# Patient Record
Sex: FEMALE | Race: WHITE | NOT HISPANIC OR LATINO | Employment: FULL TIME | URBAN - METROPOLITAN AREA
[De-identification: names, ages, dates, MRNs, and addresses within clinical notes are randomized per-mention and may not be internally consistent; named-entity substitution may affect disease eponyms.]

---

## 2017-01-16 ENCOUNTER — TRANSCRIBE ORDERS (OUTPATIENT)
Dept: ADMINISTRATIVE | Facility: HOSPITAL | Age: 47
End: 2017-01-16

## 2017-01-16 DIAGNOSIS — Z12.31 VISIT FOR SCREENING MAMMOGRAM: Primary | ICD-10-CM

## 2017-01-17 ENCOUNTER — HOSPITAL ENCOUNTER (OUTPATIENT)
Dept: MAMMOGRAPHY | Facility: CLINIC | Age: 47
Discharge: HOME | End: 2017-01-17
Payer: COMMERCIAL

## 2017-01-17 ENCOUNTER — HOSPITAL ENCOUNTER (OUTPATIENT)
Dept: ULTRASOUND IMAGING | Facility: CLINIC | Age: 47
Discharge: HOME | End: 2017-01-17
Payer: COMMERCIAL

## 2017-01-17 ENCOUNTER — GENERIC CONVERSION - ENCOUNTER (OUTPATIENT)
Dept: OTHER | Facility: OTHER | Age: 47
End: 2017-01-17

## 2017-01-17 DIAGNOSIS — Z12.31 ENCOUNTER FOR SCREENING MAMMOGRAM FOR MALIGNANT NEOPLASM OF BREAST: ICD-10-CM

## 2017-01-17 DIAGNOSIS — R92.8 OTHER (ABNORMAL) FINDINGS ON RADIOLOGICAL EXAMINATION OF BREAST: ICD-10-CM

## 2017-01-17 DIAGNOSIS — R92.8 OTHER ABNORMAL AND INCONCLUSIVE FINDINGS ON DIAGNOSTIC IMAGING OF BREAST: ICD-10-CM

## 2017-01-17 PROCEDURE — G0204 DX MAMMO INCL CAD BI: HCPCS

## 2017-01-17 PROCEDURE — 76642 ULTRASOUND BREAST LIMITED: CPT

## 2017-02-07 ENCOUNTER — ALLSCRIPTS OFFICE VISIT (OUTPATIENT)
Dept: OTHER | Facility: OTHER | Age: 47
End: 2017-02-07

## 2017-07-10 DIAGNOSIS — R92.8 OTHER ABNORMAL AND INCONCLUSIVE FINDINGS ON DIAGNOSTIC IMAGING OF BREAST: ICD-10-CM

## 2017-08-11 ENCOUNTER — TRANSCRIBE ORDERS (OUTPATIENT)
Dept: LAB | Facility: CLINIC | Age: 47
End: 2017-08-11

## 2017-08-11 ENCOUNTER — LAB CONVERSION - ENCOUNTER (OUTPATIENT)
Dept: OTHER | Facility: OTHER | Age: 47
End: 2017-08-11

## 2017-08-11 ENCOUNTER — APPOINTMENT (OUTPATIENT)
Dept: LAB | Facility: CLINIC | Age: 47
End: 2017-08-11
Payer: COMMERCIAL

## 2017-08-11 DIAGNOSIS — F41.1 GENERALIZED ANXIETY DISORDER: ICD-10-CM

## 2017-08-11 DIAGNOSIS — Z13.1 ENCOUNTER FOR SCREENING FOR DIABETES MELLITUS: ICD-10-CM

## 2017-08-11 DIAGNOSIS — Z13.220 ENCOUNTER FOR SCREENING FOR LIPOID DISORDERS: ICD-10-CM

## 2017-08-11 DIAGNOSIS — F32.9 MAJOR DEPRESSIVE DISORDER, SINGLE EPISODE: ICD-10-CM

## 2017-08-11 LAB
ALBUMIN SERPL BCP-MCNC: 3.6 G/DL (ref 3.5–5)
ALP SERPL-CCNC: 69 U/L (ref 46–116)
ALT SERPL W P-5'-P-CCNC: 22 U/L (ref 12–78)
ANION GAP SERPL CALCULATED.3IONS-SCNC: 9 MMOL/L (ref 4–13)
AST SERPL W P-5'-P-CCNC: 19 U/L (ref 5–45)
BACTERIA UR QL AUTO: ABNORMAL /HPF
BASOPHILS # BLD AUTO: 0.02 THOUSANDS/ΜL (ref 0–0.1)
BASOPHILS NFR BLD AUTO: 0 % (ref 0–1)
BILIRUB SERPL-MCNC: 0.6 MG/DL (ref 0.2–1)
BILIRUB UR QL STRIP: NEGATIVE
BUN SERPL-MCNC: 15 MG/DL (ref 5–25)
CALCIUM SERPL-MCNC: 8.8 MG/DL (ref 8.3–10.1)
CHLORIDE SERPL-SCNC: 103 MMOL/L (ref 100–108)
CHOLEST SERPL-MCNC: 134 MG/DL (ref 50–200)
CLARITY UR: CLEAR
CO2 SERPL-SCNC: 28 MMOL/L (ref 21–32)
COLOR UR: YELLOW
CREAT SERPL-MCNC: 0.81 MG/DL (ref 0.6–1.3)
EOSINOPHIL # BLD AUTO: 0.3 THOUSAND/ΜL (ref 0–0.61)
EOSINOPHIL NFR BLD AUTO: 4 % (ref 0–6)
ERYTHROCYTE [DISTWIDTH] IN BLOOD BY AUTOMATED COUNT: 13.8 % (ref 11.6–15.1)
EST. AVERAGE GLUCOSE BLD GHB EST-MCNC: 105 MG/DL
GFR SERPL CREATININE-BSD FRML MDRD: 87 ML/MIN/1.73SQ M
GLUCOSE P FAST SERPL-MCNC: 90 MG/DL (ref 65–99)
GLUCOSE UR STRIP-MCNC: NEGATIVE MG/DL
HBA1C MFR BLD: 5.3 % (ref 4.2–6.3)
HCT VFR BLD AUTO: 34.4 % (ref 34.8–46.1)
HDLC SERPL-MCNC: 55 MG/DL (ref 40–60)
HGB BLD-MCNC: 11.4 G/DL (ref 11.5–15.4)
HGB UR QL STRIP.AUTO: ABNORMAL
KETONES UR STRIP-MCNC: NEGATIVE MG/DL
LDLC SERPL CALC-MCNC: 60 MG/DL (ref 0–100)
LEUKOCYTE ESTERASE UR QL STRIP: ABNORMAL
LYMPHOCYTES # BLD AUTO: 2.63 THOUSANDS/ΜL (ref 0.6–4.47)
LYMPHOCYTES NFR BLD AUTO: 38 % (ref 14–44)
MCH RBC QN AUTO: 29.8 PG (ref 26.8–34.3)
MCHC RBC AUTO-ENTMCNC: 33.1 G/DL (ref 31.4–37.4)
MCV RBC AUTO: 90 FL (ref 82–98)
MONOCYTES # BLD AUTO: 0.52 THOUSAND/ΜL (ref 0.17–1.22)
MONOCYTES NFR BLD AUTO: 8 % (ref 4–12)
NEUTROPHILS # BLD AUTO: 3.46 THOUSANDS/ΜL (ref 1.85–7.62)
NEUTS SEG NFR BLD AUTO: 50 % (ref 43–75)
NITRITE UR QL STRIP: NEGATIVE
NON-SQ EPI CELLS URNS QL MICRO: ABNORMAL /HPF
PH UR STRIP.AUTO: 5.5 [PH] (ref 4.5–8)
PLATELET # BLD AUTO: 260 THOUSANDS/UL (ref 149–390)
PMV BLD AUTO: 9.5 FL (ref 8.9–12.7)
POTASSIUM SERPL-SCNC: 3.8 MMOL/L (ref 3.5–5.3)
PROT SERPL-MCNC: 7.8 G/DL (ref 6.4–8.2)
PROT UR STRIP-MCNC: NEGATIVE MG/DL
RBC # BLD AUTO: 3.83 MILLION/UL (ref 3.81–5.12)
RBC #/AREA URNS AUTO: ABNORMAL /HPF
SODIUM SERPL-SCNC: 140 MMOL/L (ref 136–145)
SP GR UR STRIP.AUTO: 1.02 (ref 1–1.03)
TRIGL SERPL-MCNC: 94 MG/DL
TSH SERPL DL<=0.05 MIU/L-ACNC: 2.96 UIU/ML (ref 0.36–3.74)
UROBILINOGEN UR QL STRIP.AUTO: 0.2 E.U./DL
WBC # BLD AUTO: 6.93 THOUSAND/UL (ref 4.31–10.16)
WBC #/AREA URNS AUTO: ABNORMAL /HPF

## 2017-08-11 PROCEDURE — 83036 HEMOGLOBIN GLYCOSYLATED A1C: CPT

## 2017-08-11 PROCEDURE — 80061 LIPID PANEL: CPT

## 2017-08-11 PROCEDURE — 81001 URINALYSIS AUTO W/SCOPE: CPT

## 2017-08-11 PROCEDURE — 80053 COMPREHEN METABOLIC PANEL: CPT

## 2017-08-11 PROCEDURE — 36415 COLL VENOUS BLD VENIPUNCTURE: CPT

## 2017-08-11 PROCEDURE — 84443 ASSAY THYROID STIM HORMONE: CPT

## 2017-08-11 PROCEDURE — 85025 COMPLETE CBC W/AUTO DIFF WBC: CPT

## 2017-08-14 ENCOUNTER — ALLSCRIPTS OFFICE VISIT (OUTPATIENT)
Dept: OTHER | Facility: OTHER | Age: 47
End: 2017-08-14

## 2017-08-28 ENCOUNTER — TRANSCRIBE ORDERS (OUTPATIENT)
Dept: ADMINISTRATIVE | Facility: HOSPITAL | Age: 47
End: 2017-08-28

## 2017-08-28 DIAGNOSIS — R92.8 ABNORMAL MAMMOGRAM: Primary | ICD-10-CM

## 2017-08-31 ENCOUNTER — ALLSCRIPTS OFFICE VISIT (OUTPATIENT)
Dept: OTHER | Facility: OTHER | Age: 47
End: 2017-08-31

## 2017-09-08 ENCOUNTER — HOSPITAL ENCOUNTER (OUTPATIENT)
Dept: MAMMOGRAPHY | Facility: CLINIC | Age: 47
Discharge: HOME | End: 2017-09-08
Payer: COMMERCIAL

## 2017-09-08 ENCOUNTER — HOSPITAL ENCOUNTER (OUTPATIENT)
Dept: ULTRASOUND IMAGING | Facility: CLINIC | Age: 47
Discharge: HOME | End: 2017-09-08
Payer: COMMERCIAL

## 2017-09-08 ENCOUNTER — GENERIC CONVERSION - ENCOUNTER (OUTPATIENT)
Dept: OTHER | Facility: OTHER | Age: 47
End: 2017-09-08

## 2017-09-08 DIAGNOSIS — R92.8 ABNORMAL MAMMOGRAM: ICD-10-CM

## 2017-09-08 PROCEDURE — G0279 TOMOSYNTHESIS, MAMMO: HCPCS

## 2017-09-08 PROCEDURE — G0206 DX MAMMO INCL CAD UNI: HCPCS

## 2017-09-08 PROCEDURE — 76642 ULTRASOUND BREAST LIMITED: CPT

## 2017-11-06 DIAGNOSIS — D64.9 ANEMIA: ICD-10-CM

## 2017-11-06 DIAGNOSIS — R31.29 OTHER MICROSCOPIC HEMATURIA: ICD-10-CM

## 2017-11-29 ENCOUNTER — APPOINTMENT (OUTPATIENT)
Dept: LAB | Facility: CLINIC | Age: 47
End: 2017-11-29
Payer: COMMERCIAL

## 2017-11-29 ENCOUNTER — TRANSCRIBE ORDERS (OUTPATIENT)
Dept: LAB | Facility: CLINIC | Age: 47
End: 2017-11-29

## 2017-11-29 DIAGNOSIS — D64.9 ANEMIA: ICD-10-CM

## 2017-11-29 DIAGNOSIS — R31.29 OTHER MICROSCOPIC HEMATURIA: ICD-10-CM

## 2017-11-29 LAB
BACTERIA UR QL AUTO: ABNORMAL /HPF
BASOPHILS # BLD AUTO: 0.02 THOUSANDS/ΜL (ref 0–0.1)
BASOPHILS NFR BLD AUTO: 0 % (ref 0–1)
BILIRUB UR QL STRIP: ABNORMAL
CLARITY UR: CLEAR
COLOR UR: YELLOW
EOSINOPHIL # BLD AUTO: 0.2 THOUSAND/ΜL (ref 0–0.61)
EOSINOPHIL NFR BLD AUTO: 4 % (ref 0–6)
ERYTHROCYTE [DISTWIDTH] IN BLOOD BY AUTOMATED COUNT: 14.9 % (ref 11.6–15.1)
FERRITIN SERPL-MCNC: 16 NG/ML (ref 8–388)
GLUCOSE UR STRIP-MCNC: NEGATIVE MG/DL
HCT VFR BLD AUTO: 33.7 % (ref 34.8–46.1)
HGB BLD-MCNC: 11.2 G/DL (ref 11.5–15.4)
HGB UR QL STRIP.AUTO: NEGATIVE
KETONES UR STRIP-MCNC: ABNORMAL MG/DL
LEUKOCYTE ESTERASE UR QL STRIP: ABNORMAL
LYMPHOCYTES # BLD AUTO: 1.7 THOUSANDS/ΜL (ref 0.6–4.47)
LYMPHOCYTES NFR BLD AUTO: 33 % (ref 14–44)
MCH RBC QN AUTO: 30 PG (ref 26.8–34.3)
MCHC RBC AUTO-ENTMCNC: 33.2 G/DL (ref 31.4–37.4)
MCV RBC AUTO: 90 FL (ref 82–98)
MONOCYTES # BLD AUTO: 0.34 THOUSAND/ΜL (ref 0.17–1.22)
MONOCYTES NFR BLD AUTO: 7 % (ref 4–12)
NEUTROPHILS # BLD AUTO: 2.87 THOUSANDS/ΜL (ref 1.85–7.62)
NEUTS SEG NFR BLD AUTO: 56 % (ref 43–75)
NITRITE UR QL STRIP: NEGATIVE
NON-SQ EPI CELLS URNS QL MICRO: ABNORMAL /HPF
PH UR STRIP.AUTO: 5.5 [PH] (ref 4.5–8)
PLATELET # BLD AUTO: 231 THOUSANDS/UL (ref 149–390)
PMV BLD AUTO: 10.4 FL (ref 8.9–12.7)
PROT UR STRIP-MCNC: NEGATIVE MG/DL
RBC # BLD AUTO: 3.73 MILLION/UL (ref 3.81–5.12)
RBC #/AREA URNS AUTO: ABNORMAL /HPF
SP GR UR STRIP.AUTO: >=1.03 (ref 1–1.03)
UROBILINOGEN UR QL STRIP.AUTO: 1 E.U./DL
WBC # BLD AUTO: 5.13 THOUSAND/UL (ref 4.31–10.16)
WBC #/AREA URNS AUTO: ABNORMAL /HPF

## 2017-11-29 PROCEDURE — 81001 URINALYSIS AUTO W/SCOPE: CPT

## 2017-11-29 PROCEDURE — 36415 COLL VENOUS BLD VENIPUNCTURE: CPT

## 2017-11-29 PROCEDURE — 85025 COMPLETE CBC W/AUTO DIFF WBC: CPT

## 2017-11-29 PROCEDURE — 82728 ASSAY OF FERRITIN: CPT

## 2017-11-30 ENCOUNTER — ALLSCRIPTS OFFICE VISIT (OUTPATIENT)
Dept: OTHER | Facility: OTHER | Age: 47
End: 2017-11-30

## 2017-11-30 ENCOUNTER — GENERIC CONVERSION - ENCOUNTER (OUTPATIENT)
Dept: OTHER | Facility: OTHER | Age: 47
End: 2017-11-30

## 2017-12-05 NOTE — PROGRESS NOTES
Assessment    1  Weight loss (783 21) (R63 4)   2  Attention deficit disorder (ADD) (314 00) (F98 8)   3  Depression (311) (F32 9)   4  Borderline anemia (285 9) (D64 9)   5  Generalized anxiety disorder (300 02) (F41 1)   6  Insomnia (780 52) (G47 00)    Plan  Attention deficit disorder (ADD)    · Amphetamine-Dextroamphet ER 30 MG Oral Capsule Extended Release 24  Hour (Adderall XR); TAKE 1 CAPSULE Daily  Borderline anemia    · (1) CBC/PLT/DIFF; Status:Active; Requested for:21May2018;    · (1) FERRITIN; Status:Active; Requested for:21May2018;    · (1) IRON PANEL; Status:Active; Requested for:21May2018; Discussion/Summary      Weight loss, intentional: Applauded for her recent efforts to eat healthier and lose weight  Encouraged to continue with this, including regular walking program  Previous favorable lipids, normal A1C  Depression/anxiety + ADHD + insomnia: Improved  Wishes to keep same regimen for now  No AE's  Anemia (mild, iron deficiency): Daily iron supplement advised  Handout with foods high in iron  Recheck prior to next visit  Preventative: Declines flu shot  RTO 6 months  Chief Complaint  3 month f/u medication      History of Present Illness    Here for routine follow-up  Has been working hard on eating healthier + losing weight  Has cut out unhealthy carbs and most sugars  Eating whole grain waffles, Greek yogurt, nuts for breakfast  Lean meats, spinach, etc  Drinking plenty of water   has made the same changes and lost over 20 pounds  Walking regularly  Got second dog--mixed breed, puppy  Feels great as a result of her new eating habits  Increased energy, focus/concentration, decreased anxiety  Hasn't had to take Ativan for a couple months  Takes Ambien on occasion for sleep  Lexapro remains helpful  Denies feeling down/depressed lately  Handling her work responsibilities better  Recent (11/29) lab results reviewed with patient   Continued mild anemia (Hgb 11 2; previous 11 4)  Low normal ferritin (16)    Still gets her period every month  No flu shot  Review of Systems    Constitutional: recent 30 lb weight loss, but no fever and not feeling tired  ENT: no sore throat  Cardiovascular: no chest pain  Respiratory: no shortness of breath  Gastrointestinal: no abdominal pain, no constipation and no diarrhea  Neurological: no headache and no dizziness  Psychiatric: as noted in HPI  Over the past 2 weeks, how often have you been bothered by the following problems? 1 ) Little interest or pleasure in doing things? Not at all    2 ) Feeling down, depressed or hopeless? Not at all  Active Problems    1  Abnormal mammogram (793 80) (R92 8)   2  Attention deficit disorder (ADD) (314 00) (F98 8)   3  History of Benign essential hypertension (401 1) (I10)   4  History of Bereavement (V62 82) (Z63 4)   5  Borderline anemia (285 9) (D64 9)   6  Depression (311) (F32 9)   7  Depression screening (V79 0) (Z13 89)   8  Diabetes mellitus screening (V77 1) (Z13 1)   9  Generalized anxiety disorder (300 02) (F41 1)   10  History of High serum renin (790 99) (R79 89)   11  History of hyperaldosteronism (V12 29) (Z86 39)   12  Insomnia (780 52) (G47 00)   13  History of Left knee pain (719 46) (M25 562)   14  Lipid screening (V77 91) (Z13 220)   15  History of Localized rash (782 1) (R21)   16  History of Microscopic hematuria (599 72) (R31 29)   17  Need for prophylactic vaccination and inoculation against influenza (V04 81) (Z23)   18  Need for Tdap vaccination (V06 1) (Z23)   19  Post traumatic stress disorder (309 81) (F43 10)   20  History of Viral upper respiratory infection (465 9) (J06 9,B97 89)   21  Visit for screening mammogram (V76 12) (Z12 31)   22  History of Weight gain (783 1) (R63 5)    Past Medical History    1  Abnormal mammogram (793 80) (R92 8)   2  Attention deficit disorder (ADD) (314 00) (F98 8)   3   History of Benign essential hypertension (401 1) (I10)   4  Borderline anemia (285 9) (D64 9)   5  Depression (311) (F32 9)   6  Diabetes mellitus screening (V77 1) (Z13 1)   7  History of High serum renin (790 99) (R79 89)   8  History of hyperaldosteronism (V12 29) (Z86 39)   9  History of hypokalemia (V12 29) (Z86 39)   10  History of Left knee pain (719 46) (M25 562)   11  Lipid screening (V77 91) (Z13 220)   12  History of Localized rash (782 1) (R21)   13  History of Microscopic hematuria (599 72) (R31 29)   14  Need for Tdap vaccination (V06 1) (Z23)   15  History of Patellofemoral arthritis, left   16  History of Pes anserine bursitis (726 61) (M70 50)   17  History of Pregnancy-induced hypertension (642 30) (O13 9)   18  History of Viral upper respiratory infection (465 9) (J06 9,B97 89)   19  Visit for screening mammogram (V76 12) (Z12 31)   20  History of Weight gain (783 1) (R63 5)   21  Weight loss (783 21) (R63 4)    The active problems and past medical history were reviewed and updated today  Surgical History    1  History of Dental Surgery   2  History of Hernia Repair    The surgical history was reviewed and updated today  Family History  Father    1  Family history of Diabetes Mellitus (V18 0)    The family history was reviewed and updated today  Social History    · Being A Social Drinker   · History of Bereavement (V62 82) (Z63 4)   · Educational Level - Completed Bachelors Degree   · Marital History - Currently    · Never A Smoker   · Occupation:  The social history was reviewed and updated today  The social history was reviewed and is unchanged  Current Meds   1  Amphetamine-Dextroamphet ER 30 MG Oral Capsule Extended Release 24 Hour; TAKE   1 CAPSULE Daily; Therapy: 73Bbm8378 to (Evaluate:22Nov2017); Last Rx:23Oct2017 Ordered   2  Escitalopram Oxalate 20 MG Oral Tablet; take one tablet by mouth every day;    Therapy: 94QKC1283 to (Evaluate:21Apr2018)  Requested for: 60KWS4760; Last Rx: 85Msb4974 Ordered   3  LORazepam 0 5 MG Oral Tablet; TAKE 1-2 TABS TWICE A DAY AS NEEDED FOR   ANXIETY; Therapy: 15UCZ5712 to (Last Rx:43Uyx7495) Ordered   4  Zolpidem Tartrate 5 MG Oral Tablet; TAKE 1 TABLET AT BEDTIME AS NEEDED; Therapy: 72Qxm8176 to (Evaluate:51Tyw5367); Last GL:82DZP5161 Ordered    The medication list was reviewed and updated today  Allergies    1  No Known Drug Allergies    Vitals  Vital Signs    Recorded: 68PCR1471 09:05AM   Temperature 98 1 F   Heart Rate 80   Systolic 724   Diastolic 80   Height 5 ft 1 5 in   Weight 123 lb    BMI Calculated 22 86   BSA Calculated 1 55   O2 Saturation 98     Physical Exam    Constitutional   General appearance: No acute distress, well appearing and well nourished  Ears, Nose, Mouth, and Throat   Oropharynx: Normal with no erythema, edema, exudate or lesions  Pulmonary   Respiratory effort: No increased work of breathing or signs of respiratory distress  Auscultation of lungs: Clear to auscultation  Cardiovascular   Auscultation of heart: Normal rate and rhythm, normal S1 and S2, no murmurs  Musculoskeletal   Gait and station: Normal     Psychiatric   Judgment and insight: Normal     Orientation to person, place, and time: Normal     Recent and remote memory: Intact      Mood and affect: Normal        Signatures   Electronically signed by : Ijeoma Kahn; Nov 30 2017  9:50AM EST                       (Author)    Electronically signed by : Alfonso Littlejohn DO; Nov 30 2017  9:51AM EST                       (Author)

## 2018-01-13 VITALS
HEIGHT: 62 IN | TEMPERATURE: 98.1 F | OXYGEN SATURATION: 98 % | WEIGHT: 123 LBS | BODY MASS INDEX: 22.63 KG/M2 | HEART RATE: 80 BPM | DIASTOLIC BLOOD PRESSURE: 80 MMHG | SYSTOLIC BLOOD PRESSURE: 138 MMHG
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
## 2018-01-13 NOTE — RESULT NOTES
Discussion/Summary      Blood work results notable for borderline normocytic anemia as well as small amount of blood in urine with 0-1 RBC's pmf  Will discuss at upcoming follow-up visit  Verified Results  (1) CBC/PLT/DIFF 11Aug2017 06:24AM Gloria Molina    Order Number: [id number]     Test Name Result Flag Reference   WBC COUNT 6 93 Thousand/uL  4 31-10 16   RBC COUNT 3 83 Million/uL  3 81-5 12   HEMOGLOBIN 11 4 g/dL L 11 5-15 4   HEMATOCRIT 34 4 % L 34 8-46  1   MCV 90 fL  82-98   MCH 29 8 pg  26 8-34 3   MCHC 33 1 g/dL  31 4-37 4   RDW 13 8 %  11 6-15 1   MPV 9 5 fL  8 9-12 7   PLATELET COUNT 902 Thousands/uL  149-390   NEUTROPHILS RELATIVE PERCENT 50 %  43-75   LYMPHOCYTES RELATIVE PERCENT 38 %  14-44   MONOCYTES RELATIVE PERCENT 8 %  4-12   EOSINOPHILS RELATIVE PERCENT 4 %  0-6   BASOPHILS RELATIVE PERCENT 0 %  0-1   NEUTROPHILS ABSOLUTE COUNT 3 46 Thousands/? ??L  1 85-7 62   LYMPHOCYTES ABSOLUTE COUNT 2 63 Thousands/? ??L  0 60-4 47   MONOCYTES ABSOLUTE COUNT 0 52 Thousand/? ??L  0 17-1 22   EOSINOPHILS ABSOLUTE COUNT 0 30 Thousand/? ??L  0 00-0 61   BASOPHILS ABSOLUTE COUNT 0 02 Thousands/? ??L  0 00-0 10   - Patient Instructions: This bloodwork is non-fasting  Please drink two glasses of water morning of bloodwork  (1) COMPREHENSIVE METABOLIC PANEL 14DSD3013 32:47ZW Gloria Molina    Order Number: [id number]     Test Name Result Flag Reference   SODIUM 140 mmol/L  136-145   POTASSIUM 3 8 mmol/L  3 5-5 3   CHLORIDE 103 mmol/L  100-108   CARBON DIOXIDE 28 mmol/L  21-32   ANION GAP (CALC) 9 mmol/L  4-13   BLOOD UREA NITROGEN 15 mg/dL  5-25   CREATININE 0 81 mg/dL  0 60-1 30   Standardized to IDMS reference method   CALCIUM 8 8 mg/dL  8 3-10 1   BILI, TOTAL 0 60 mg/dL  0 20-1 00   ALK PHOSPHATAS 69 U/L     ALT (SGPT) 22 U/L  12-78   Specimen collection should occur prior to Sulfasalazine administration due to the potential for falsely depressed results     AST(SGOT) 19 U/L 5-45   Specimen collection should occur prior to Sulfasalazine administration due to the potential for falsely depressed results  ALBUMIN 3 6 g/dL  3 5-5 0   TOTAL PROTEIN 7 8 g/dL  6 4-8 2   eGFR 87 ml/min/1 73sq m     National Kidney Disease Education Program recommendations are as follows:  GFR calculation is accurate only with a steady state creatinine  Chronic Kidney disease less than 60 ml/min/1 73 sq  meters  Kidney failure less than 15 ml/min/1 73 sq  meters  GLUCOSE FASTING 90 mg/dL  65-99   Specimen collection should occur prior to Sulfasalazine administration due to the potential for falsely depressed results  Specimen collection should occur prior to Sulfapyridine administration due to the potential for falsely elevated results  (1) HEMOGLOBIN A1C 11Aug2017 06:24AM Jayy Vincent    Order Number: [id number]     Test Name Result Flag Reference   HEMOGLOBIN A1C 5 3 %  4 2-6 3   EST  AVG  GLUCOSE 105 mg/dl       (1) LIPID PANEL FASTING W DIRECT LDL REFLEX 11Aug2017 06:24AM Jayy Vincent    Order Number: [id number]     Test Name Result Flag Reference   CHOLESTEROL 134 mg/dL     LDL CHOLESTEROL CALCULATED 60 mg/dL  0-100   - Patient Instructions: This is a fasting blood test  Water, black tea or black coffee only after 9:00pm the night before test   Drink 2 glasses of water the morning of test       Triglyceride:        Normal ??? ??? ??? ??? ??? ??? ??? <150 mg/dl   ??? ??? ???Borderline High ??? ??? 150-199 mg/dl   ??? ??? ? ?? High ??? ??? ??? ??? ??? ??? ??? 200-499 mg/dl   ??? ??? ? ??Very High ??? ??? ??? ??? ??? >499 mg/dl      Cholesterol:       Desirable ??? ??? ??? ??? <200 mg/dl   ??? ??? Borderline High ??? 200-239 mg/dl   ??? ???  High ??? ??? ??? ??? ??? ??? >239 mg/dl      HDL Cholesterol:       High ??? ???>59 mg/dL   ??? ??? Low ??? ??? <41 mg/dL      HDL Cholesterol:       High ??? ???>59 mg/dL   ??? ??? Low ??? ??? <41 mg/dL      This screening LDL is a calculated result  It does not have the accuracy of the Direct Measured LDL in the monitoring of patients with hyperlipidemia and/or statin therapy  Direct Measure LDL (GJQ753) must be ordered separately in these patients  TRIGLYCERIDES 94 mg/dL  <=150   Specimen collection should occur prior to N-Acetylcysteine or Metamizole administration due to the potential for falsely depressed results  HDL,DIRECT 55 mg/dL  40-60   Specimen collection should occur prior to Metamizole administration due to the potential for falsley depressed results  (1) URINALYSIS w URINE C/S REFLEX (will reflex a microscopy if leukocytes, occult blood, or nitrites are not within normal limits) 11Aug2017 06:24AM Anna Mccartneymaría elena    Order Number: [id number]     Test Name Result Flag Reference   COLOR Yellow     CLARITY Clear     PH UA 5 5  4 5-8 0   LEUKOCYTE ESTERASE UA Trace A Negative   NITRITE UA Negative  Negative   PROTEIN UA Negative mg/dl  Negative   GLUCOSE UA Negative mg/dl  Negative   KETONES UA Negative mg/dl  Negative   UROBILINOGEN UA 0 2 E U /dl  0 2, 1 0 E U /dl   BILIRUBIN UA Negative  Negative   BLOOD UA Small A Negative   SPECIFIC GRAVITY UA 1 025  1 003-1 030   BACTERIA Occasional /hpf  None Seen, Occasional   EPITHELIAL CELLS Occasional /hpf  None Seen, Occasional   RBC UA 0-1 /hpf A None Seen   WBC UA 4-10 /hpf A None Seen     (1) TSH WITH FT4 REFLEX 11Aug2017 06:24AM Anna Mccartneymaría elena    Order Number: [id number]     Test Name Result Flag Reference   TSH 2 965 uIU/mL  0 358-3 740   Patients undergoing fluorescein dye angiography may retain small amounts of fluorescein in the body for 48-72 hours post procedure  Samples containing fluorescein can produce falsely depressed TSH values  If the patient had this procedure,a specimen should be resubmitted post fluorescein clearance            The recommended reference ranges for TSH during pregnancy are as follows:  First trimester 0 1 to 2 5 uIU/mL  Second trimester  0 2 to 3 0 uIU/mL  Third trimester 0 3 to 3 0 uIU/m

## 2018-01-13 NOTE — MISCELLANEOUS
Message  Spoke to patient's  as patient is at work and notified him that her saline infusion test was -ve  Will discuss further during next visit        Signatures   Electronically signed by : CYNDI Wu ; Feb 10 2016  2:33PM EST                       (Author)

## 2018-01-13 NOTE — RESULT NOTES
Verified Results  Renetta Maxim W/CAD 29NKP0482 06:54AM Rodrigo Ear     Test Name Result Flag Reference   DIGTL SCREEN MAMMO W/CAD (Report)     [facility];[address];;[place];[address]   01/08/2016 0700 01/08/2016 6952   N/A     Patient History-   Patient had first child at age 35  No known family history of cancer  Patient has never smoked  Patient's BMI is 22 9  Reason for exam- screening (asymptomatic)  Digital Bilateral Screening Mammogram   Bilateral MLO, CC, and XCCL view(s) were taken  Technologist- Alix Tello, RT(R)(M)   The breast tissue is heterogeneously dense, potentially limiting    the sensitivity of mammography  Patient risk, included in this    report, assists in determining the appropriate screening regimen    (such as 3-D mammography or the inclusion of automated breast    ultrasound or MRI)  3-D mammography may also remain indicated as    screening  No dominant soft tissue mass, architectural distortion or    suspicious calcifications are noted in either breast  The skin    and nipple contours are within normal limits  Nodular density is   present in the posterior upper outer right breast     Calcifications are also present in the upper outer right breast     Spot magnification views recommended for this baseline    evaluation  ASSESSMENT- [facility]-0 - Incomplete- needs additional imaging    evaluation     A breast health care nurse from our facility will be contacting    the patient regarding the need for additional imaging  Recommendation-   Further imaging of the right breast    Analyzed by CAD   8-10% of cancers will be missed on mammography  Management of a    palpable abnormality must be based on clinical grounds  Patients   will be notified of their results via letter from our facility  Accredited by [employer] of Radiology and FDA       Transcription Location- [address]   [place]  Year- 2 360%, [place] Lifetime- 12 892%,    Myriad Table- 1 5%, SUMMER 5 Year- 1 1%, [facility] Lifetime- 13 0%     - Ford Lombard, RAD MD   [place] Radiologist- Ford Lombard, RAD MD   Electronically Signed- Ford Lombard, RAD MD   Released Date Time- 01/08/16 0807   ------------------------------------------------------------------------------   [id number]^DERIAN DILLARD   [id number]^DERIAN DILLARD       Plan  Abnormal mammogram    · MAMMO DIAGNOSTIC RIGHT W CAD; Status:Hold For - Scheduling; Requested  for:11Jan2016;     Discussion/Summary   Right breast with nodular densities + calcifications  Additional right sided mammogram recommended  Test ordered  Breast center should also be contacting her

## 2018-01-14 VITALS
WEIGHT: 151 LBS | RESPIRATION RATE: 17 BRPM | BODY MASS INDEX: 27.79 KG/M2 | TEMPERATURE: 97.6 F | DIASTOLIC BLOOD PRESSURE: 74 MMHG | HEIGHT: 62 IN | SYSTOLIC BLOOD PRESSURE: 130 MMHG | HEART RATE: 90 BPM

## 2018-01-14 VITALS
WEIGHT: 150.06 LBS | SYSTOLIC BLOOD PRESSURE: 138 MMHG | OXYGEN SATURATION: 99 % | BODY MASS INDEX: 27.62 KG/M2 | DIASTOLIC BLOOD PRESSURE: 88 MMHG | HEIGHT: 62 IN | HEART RATE: 86 BPM | TEMPERATURE: 97.8 F | RESPIRATION RATE: 17 BRPM

## 2018-01-14 VITALS
OXYGEN SATURATION: 97 % | SYSTOLIC BLOOD PRESSURE: 134 MMHG | HEIGHT: 62 IN | BODY MASS INDEX: 29.36 KG/M2 | TEMPERATURE: 98 F | HEART RATE: 94 BPM | DIASTOLIC BLOOD PRESSURE: 74 MMHG | RESPIRATION RATE: 18 BRPM | WEIGHT: 159.56 LBS
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
## 2018-01-16 NOTE — RESULT NOTES
Discussion/Summary   Results discussed with patient at office visit 11/30  See note for details  Verified Results  (1) CBC/PLT/DIFF [id number] 07:20AM Linh Yeh    Order Number: [id number]     Test Name Result Flag Reference   WBC COUNT 5 13 Thousand/uL  4 31-10 16   RBC COUNT 3 73 Million/uL L 3 81-5 12   HEMOGLOBIN 11 2 g/dL L 11 5-15 4   HEMATOCRIT 33 7 % L 34 8-46  1   MCV 90 fL  82-98   MCH 30 0 pg  26 8-34 3   MCHC 33 2 g/dL  31 4-37 4   RDW 14 9 %  11 6-15 1   MPV 10 4 fL  8 9-12 7   PLATELET COUNT 821 Thousands/uL  149-390   NEUTROPHILS RELATIVE PERCENT 56 %  43-75   LYMPHOCYTES RELATIVE PERCENT 33 %  14-44   MONOCYTES RELATIVE PERCENT 7 %  4-12   EOSINOPHILS RELATIVE PERCENT 4 %  0-6   BASOPHILS RELATIVE PERCENT 0 %  0-1   NEUTROPHILS ABSOLUTE COUNT 2 87 Thousands/? ??L  1 85-7 62   LYMPHOCYTES ABSOLUTE COUNT 1 70 Thousands/? ??L  0 60-4 47   MONOCYTES ABSOLUTE COUNT 0 34 Thousand/? ??L  0 17-1 22   EOSINOPHILS ABSOLUTE COUNT 0 20 Thousand/? ??L  0 00-0 61   BASOPHILS ABSOLUTE COUNT 0 02 Thousands/? ??L  0 00-0 10     (1) URINALYSIS w URINE C/S REFLEX (will reflex a microscopy if leukocytes, occult blood, or nitrites are not within normal limits) [id number] 07:20AM Linh Yeh    Order Number: [id number]     Test Name Result Flag Reference   COLOR Yellow     CLARITY Clear     PH UA 5 5  4 5-8 0   LEUKOCYTE ESTERASE UA Small A Negative   NITRITE UA Negative  Negative   PROTEIN UA Negative mg/dl  Negative   GLUCOSE UA Negative mg/dl  Negative   KETONES UA 40 (2+) mg/dl A Negative   UROBILINOGEN UA 1 0 E U /dl  0 2, 1 0 E U /dl   BILIRUBIN UA Small A Negative   BLOOD UA Negative  Negative   SPECIFIC GRAVITY UA >=1 030  1 003-1 030   BACTERIA Occasional /hpf  None Seen, Occasional   EPITHELIAL CELLS Occasional /hpf  None Seen, Occasional   RBC UA None Seen /hpf  None Seen, 0-5   WBC UA 4-10 /hpf A None Seen, 0-5, 5-55, 5-65     (1) FERRITIN [id number] 07:20AM Linh Yeh    Order Number: [id number]     Test Name Result Flag Reference   FERRITIN 16 ng/mL  2-110

## 2018-01-16 NOTE — RESULT NOTES
Message   Can we mail requisitions for repeat ultrasound + mammogram, thanks  Verified Results  MAMMO DIAGNOSTIC RIGHT W CAD [id number] 07:57AM Luis A Bonillalalinda     Test Name Result Flag Reference   MAMMO DIAGNOSTIC RIGHT W CAD (Report)     Patient History:   Patient had first child at age 35  No known family history of cancer  Patient has never smoked  Patient's BMI is 22 9  Reason for exam: additional evaluation requested from prior    study  Short interval follow-up of right breast microcalcifications and    right breast masses seen with ultrasound  Mammo Diagnostic Right W CAD: July 18, 2016 - Check In #: [de-identified]   CC, MLO, CC with magnification, ML with magnification, and XCCL    view(s) were taken of the right breast      Technologist: ROBBIN Mohr (R)(M)   Prior study comparison: January 13, 2016, mammo diagnostic right    W CAD performed at [address] [place] [address]  January 13, 2016, US breast right limited performed at [address] [place] [address]  January 8, 2016, digital bilateral    screening mammogram, performed at [address] [place] [address]  The breast tissue is extremely dense, potentially limiting the    sensitivity of mammography  Patient risk, included in this    report, assists in determining the appropriate screening regimen    (such as 3-D mammography or the inclusion of automated breast    ultrasound or MRI)  3-D mammography may also remain indicated as    screening  The microcalcifications in the outer right breast    appear stable from the previous exam, but require continued    six-month surveillance with magnification imaging  No new mass    lesions or additional calcifications are appreciated  Skin and    nipple profile are normal  No axillary adenopathy  Ultrasound of the right breast was performed and compared with    the prior study   At the 10:00 location 7 cm from nipple there is   11 x 3 x 10 mm benign-appearing circumscribed hypoechoic oval    wider than tall mass most consistent with fibroadenoma  Immediately adjacent to this is a similar-appearing structure    which is 7 x 3 x 8 mm  No suspicious interval change from prior    study  There is a cyst noted at the 9:00 location 6 cm from    nipple which is minimally complicated measuring 8 x 4 x 6 mm  Good posterior acoustic transmission noted  No internal flow    related to any of the findings  Again, continued six-month    ultrasound surveillance suggested  Stable exam     US Breast Right Limited: July 18, 2016 - Check In #: [de-identified]   Technologist: Alec Adams RDMS     ASSESSMENT: BiRad:3 - Probably Benign (Overall)     Recommendation:   Follow-up diagnostic mammogram of both breasts in 6 months  Ultrasound of the right breast in 6 months  Analyzed by CAD     Transcription Location: [facility]   Signing Station: [id number]     Risk Value(s):   Tyrer-Cuzick 10 Year: 2 360%, Tyrer-Cuzick Lifetime: 12 892%,    Myriad Table: 1 5%, SUMMER 5 Year: 1 1%, NCI Lifetime: 13 0%   Signed by:   Luster Collet, MD   7/18/16     *US BREAST RIGHT LIMITED (DIAGNOSTIC) [id number] 07:57AM Yue Counter     Test Name Result Flag Reference   US BREAST RIGHT LIMITED (Report)     Patient History:   Patient had first child at age 35  No known family history of cancer  Patient has never smoked  Patient's BMI is 22 9  Reason for exam: additional evaluation requested from prior    study  Short interval follow-up of right breast microcalcifications and    right breast masses seen with ultrasound  Mammo Diagnostic Right W CAD: July 18, 2016 - Check In #: [de-identified]   CC, MLO, CC with magnification, ML with magnification, and XCCL    view(s) were taken of the right breast      Technologist: ROBBIN Howe (R)(M)   Prior study comparison: January 13, 2016, mammo diagnostic right    W CAD performed at [address] [place] [address]   January 13, 2016, US breast right limited performed at [address] [place] [address]  January 8, 2016, digital bilateral    screening mammogram, performed at [facility]  The breast tissue is extremely dense, potentially limiting the    sensitivity of mammography  Patient risk, included in this    report, assists in determining the appropriate screening regimen    (such as 3-D mammography or the inclusion of automated breast    ultrasound or MRI)  3-D mammography may also remain indicated as    screening  The microcalcifications in the outer right breast    appear stable from the previous exam, but require continued    six-month surveillance with magnification imaging  No new mass    lesions or additional calcifications are appreciated  Skin and    nipple profile are normal  No axillary adenopathy  Ultrasound of the right breast was performed and compared with    the prior study  At the 10:00 location 7 cm from nipple there is   11 x 3 x 10 mm benign-appearing circumscribed hypoechoic oval    wider than tall mass most consistent with fibroadenoma  Immediately adjacent to this is a similar-appearing structure    which is 7 x 3 x 8 mm  No suspicious interval change from prior    study  There is a cyst noted at the 9:00 location 6 cm from    nipple which is minimally complicated measuring 8 x 4 x 6 mm  Good posterior acoustic transmission noted  No internal flow    related to any of the findings  Again, continued six-month    ultrasound surveillance suggested  Stable exam     US Breast Right Limited: July 18, 2016 - Check In #: [de-identified]   Technologist: Za Frankel RDMS     ASSESSMENT: BiRad:3 - Probably Benign (Overall)     Recommendation:   Follow-up diagnostic mammogram of both breasts in 6 months  Ultrasound of the right breast in 6 months     Analyzed by CAD     Transcription Location: [address]   Signing Station: [id number]     Risk Value(s):   Mark-Palma 10 Year: 2 360%, Tyrer-Cuelmer Lifetime: 12 892%,    Myriad Table: 1 5%, SUMMER 5 Year: 1 1%, NCI Lifetime: 13 0%   Signed by:   Monika Linda MD   7/18/16       Plan  Abnormal mammogram, Visit for screening mammogram    · * MAMMO SCREENING BILATERAL W CAD; Status:Hold For - Scheduling; Requested  for:16Jan2017;    · US BREAST RIGHT COMPLETE (ABUS); Status:Hold For - Scheduling; Requested  WCL:[id number];     Discussion/Summary   Right breast ultrasound/mammogram showing STABLE masses + calcifications + cysts  No suspicious lesions  Radiologist recommends repeat right breast ultrasound + regular screening mammogram in 6 months  Will mail slips for this

## 2018-01-17 NOTE — RESULT NOTES
Message   Can we mail slip for repeat mammogram?  Thanks     Verified Results  *US BREAST RIGHT LIMITED (DIAGNOSTIC) [id number] 09:32AM Zari Choe     Test Name Result Flag Reference   US BREAST RIGHT LIMITED (Report)     Patient History:   Patient had first child at age 35  No known family history of cancer  Patient has never smoked  Patient's BMI is 22 9  Reason for exam: follow-up at short interval from prior study  Six-month follow-up of calcifications in the right breast and    hypoechoic lesions on ultrasound in the right breast      Mammo Diagnostic Bilateral W CAD: January 17, 2017 - Check In #:    [de-identified]   Bilateral MLO, CC, and XCCL view(s) were taken  CC with    magnification and ML with magnification view(s) were taken of the   right breast      Technologist: RT Lisa(R)(M)   Prior study comparison: July 18, 2016, US breast right limited    performed at [address] [place] [address]  July 18, 2016,    mammo diagnostic right W CAD performed at [address] [place] [address]  January 13, 2016, US breast right limited    performed at [address] [place] [address]  January 13, 2016, mammo diagnostic right W CAD performed at [address] [place] [address]  January 8, 2016, digital bilateral    screening mammogram, performed at [address] [place] [address]  The breast tissue is heterogeneously dense, potentially limiting    the sensitivity of mammography  Patient risk, included in this    report, assists in determining the appropriate screening regimen    (such as 3-D mammography or the inclusion of automated breast    ultrasound or MRI)  3-D mammography may also remain indicated as    screening  These images were obtained using digital technique    and with the assistance of Computer Aided Detection  There are    no dominant masses, foci of architectural distortion to suggest    malignancy   The visualized skin appears normal  Previously    described calcifications are stable  Continued six-month    surveillance with magnification views recommended  Targeted ultrasound demonstrates no evidence of new hypoechoic    mass or architectural distortion to suggest malignancy  Previously described cysts of varying complexity are also stable  US Breast Right Limited: January 17, 2017 - Check In #: [de-identified]   Technologist: Calderon Matta RDMS     ASSESSMENT: BiRad:3 - Probably Benign (Overall)   Diag Mammogram: BiRad:3 - probably benign finding in the right    breast    Right breast Right Brst US: BiRad:3 - probably benign finding in    the right breast      Recommendation:   Follow-up diagnostic mammogram of the right breast in 6 months  Analyzed by CAD     Transcription Location: [address]   Signing Station: [id number]     Risk Value(s):   Tyrer-Cuzick 10 Year: 2 463%, Tyrer-Cuzick Lifetime: 12 737%,    Myriad Table: 1 5%, SUMMER 5 Year: 1 2%, NCI Lifetime: 12 8%   Signed by:   Shadia Guzmán MD   1/17/17     MAMMO DIAGNOSTIC BILATERAL W CAD [id number] 08:23AM Naty Vann    Order Number: [id number]    - Patient Instructions: To schedule this appointment, please contact Central Scheduling at [phone]  Do not wear any perfume, powder, lotion or deodorant on breast or underarm area  Please bring your doctors order, referral (if needed) and insurance information with you on the day of the test  Failure to bring this information may result in this test being rescheduled  Arrive 15 minutes prior to your appointment time to register  On the day of your test, please bring any prior mammogram or breast studies with you that were not performed at a [facility]  Failure to bring prior exams may result in your test needing to be rescheduled   Order Number: [id number]    - Patient Instructions: To schedule this appointment, please contact Central Scheduling at [phone]    Do not wear any perfume, powder, lotion or deodorant on breast or underarm area        Please bring your doctors order, referral (if needed) and insurance information with you on the day of the test  Failure to bring this information may result in this test being rescheduled  Arrive 15 minutes prior to your appointment time to register  On the day of your test, please bring any prior mammogram or breast studies with you that were not performed at a [facility]  Failure to bring prior exams may result in your test needing to be rescheduled  Test Name Result Flag Reference   MAMMO DIAGNOSTIC BILATERAL W CAD (Report)     Patient History:   Patient had first child at age 35  No known family history of cancer  Patient has never smoked  Patient's BMI is 22 9  Reason for exam: follow-up at short interval from prior study  Six-month follow-up of calcifications in the right breast and    hypoechoic lesions on ultrasound in the right breast      Mammo Diagnostic Bilateral W CAD: January 17, 2017 - Check In #:    [de-identified]   Bilateral MLO, CC, and XCCL view(s) were taken  CC with    magnification and ML with magnification view(s) were taken of the   right breast      Technologist: RT Sebastian(R)(M)   Prior study comparison: July 18, 2016, US breast right limited    performed at [address] [place] [address]  July 18, 2016,    mammo diagnostic right W CAD performed at [address] [place] [address]  January 13, 2016, US breast right limited    performed at [address] [place] [address]  January 13, 2016, mammo diagnostic right W CAD performed at [address] [place] [address]  January 8, 2016, digital bilateral    screening mammogram, performed at [address] [place] [address]  The breast tissue is heterogeneously dense, potentially limiting    the sensitivity of mammography   Patient risk, included in this    report, assists in determining the appropriate screening regimen    (such as 3-D mammography or the inclusion of automated breast ultrasound or MRI)  3-D mammography may also remain indicated as    screening  These images were obtained using digital technique    and with the assistance of Computer Aided Detection  There are    no dominant masses, foci of architectural distortion to suggest    malignancy  The visualized skin appears normal  Previously    described calcifications are stable  Continued six-month    surveillance with magnification views recommended  Targeted ultrasound demonstrates no evidence of new hypoechoic    mass or architectural distortion to suggest malignancy  Previously described cysts of varying complexity are also stable  US Breast Right Limited: January 17, 2017 - Check In #: [de-identified]   Technologist: Paola Delatorre RDMS     ASSESSMENT: BiRad:3 - Probably Benign (Overall)   Diag Mammogram: BiRad:3 - probably benign finding in the right    breast    Right breast Right Brst US: BiRad:3 - probably benign finding in    the right breast      Recommendation:   Follow-up diagnostic mammogram of the right breast in 6 months  Analyzed by CAD     Transcription Location: [address]   Signing Station: [id number]     Risk Value(s):   Tyrer-Cuzick 10 Year: 2 463%, Tyrer-Cuzick Lifetime: 12 737%,    Myriad Table: 1 5%, SUMMER 5 Year: 1 2%, NCI Lifetime: 12 8%   Signed by:   Genesis Monterroso MD   1/17/17       Plan  Abnormal mammogram    · MAMMO DIAGNOSTIC RIGHT W CAD; Status:Hold For - Scheduling; Requested  for:[id number];     Discussion/Summary      Mammogram and ultrasound showing stable right breast calcifications  Recommend repeat right breast mammogram in 6 months (will mail slip for this)

## 2018-01-17 NOTE — RESULT NOTES
Discussion/Summary   Patient notified of stable, likely benign, right breast findings  Repeat mammo + ultrasound ordered for 6 months and mailed to patient  Verified Results  MAMMO DIAGNOSTIC RIGHT W 3D & CAD [id number] 07:36AM Atiya Johnson     Test Name Result Flag Reference   [facility] DIAGNOSTIC RIGHT W 3D & CAD (Report)     Patient History:   Patient had first child at age 35  No known family history of cancer  Patient has never smoked  Patient's BMI is 22 9  Reason for exam: addl eval requested from prior study  54-year-old female with probably benign right breast    calcifications as well as probably benign right breast nodules    seen on ultrasound  The patient is here for six-month    reevaluation  Mammo Diagnostic Right W DBT and CAD: September 8, 2017 - Check    In #: [de-identified]   2D/3D Procedure   3D views: MLO view(s) were taken of the right breast    2D views: CC and XCCL view(s) were taken of the right breast        Technologist: ROBBIN Viramontes (ROBBIN)(M)   Prior study comparison: January 17, 2017, mammo diagnostic    bilateral W CAD performed at [facility]  January 17, 2017, US breast right limited performed at [address] [facility]  July 18, 2016, mammo diagnostic right W   CAD performed at [address] [facility]  July 18, 2016, US breast right limited performed at [address] [facility]  January 13, 2016, US breast right limited    performed at [address] [facility]  January 13, 2016, mammo diagnostic right W CAD performed at [address] [facility]  January 8, 2016, digital bilateral    screening mammogram, performed at [address] [place] [address]  The breast tissue is heterogeneously dense, potentially limiting    the sensitivity of mammography   Patient risk, included in this    report, assists in determining the appropriate screening regimen    (such as 3-D mammography or the inclusion of automated breast    ultrasound or MRI)  3-D mammography may also remain indicated as    screening  US Breast Right Limited: September 8, 2017 - Check In #: [de-identified]   Standard views  Technologist: Raheem Reyes RDMS   Heterogeneity can be either focal or diffuse  The breast    echotexture is characterized by multiple small areas of increased   and decreased echogenicity  Shadowing may occur at the    interfaces of the fat lobules and parenchyma  This pattern occurs   in younger breasts and those with heterogeneously dense    parenchyma depicted mammographically  Again identified is the    loose group of calcifications in the outer right breast, not    significantly changed from the 1st diagnostic examination    obtained January 13, 2016  No dominant masses are seen  There is a stable nodular asymmetry   in the outer posterior right breast  There is no architectural    distortion or skin thickening  The axillary regions are benign  Following diagnostic mammography, the patient was taken to the    ultrasound suite  RIGHT BREAST ULTRASOUND:     At the 10 o'clock position, 7 cm from the nipple, there is a    hypoechoic nodule which measures 1 x 0 4 x 0 8 cm  No internal    vascularity is identified  At the 10 o'clock position, 7 cm from the nipple, there is a    hypoechoic nodule measuring 0 5 x 0 3 x 0 8 cm  A 3rd nodule is seen at the 9 o'clock position, 6 cm from the    nipple, measuring 0 9 x 0 3 x 0 7 cm  The above-described nodules are stable when compared to the prior   studies  No new nodules are identified  1  Stable right diagnostic mammogram and right breast    ultrasound  2  Bilateral diagnostic mammography as well as repeat right    breast ultrasound in 6 months is recommended to document    stability of the above-described probably benign findings       ACR BI-RADSï¾® Assessments: BiRad:3 - Probably Benign (Overall)   Right breast Right Diag Mamm: BiRad:3 - probably benign finding    in the right breast    Right breast Right Brst US: BiRad:3 - probably benign finding in    the right breast      Recommendation:   Follow-up diagnostic mammogram of both breasts in 6 months  Ultrasound of the right breast in 6 months  The patient is scheduled in a reminder system for screening    mammography  Transcription Location: ROBBIN Davis 98: [id number]     Risk Value(s):   Tyrer-Cuzick 10 Year: 2 600%, Tyrer-Cuzick Lifetime: 12 600%,    Myriad Table: 1 5%, SUMMER 5 Year: 1 2%, NCI Lifetime: 12 7%   Signed by:   Olga Rogers MD   9/8/17     *US BREAST RIGHT LIMITED (DIAGNOSTIC) [id number] 07:36AM Tosha Butts     Test Name Result Flag Reference   US BREAST RIGHT LIMITED (Report)     Patient History:   Patient had first child at age 35  No known family history of cancer  Patient has never smoked  Patient's BMI is 22 9  Reason for exam: addl eval requested from prior study  19-year-old female with probably benign right breast    calcifications as well as probably benign right breast nodules    seen on ultrasound  The patient is here for six-month    reevaluation  Mammo Diagnostic Right W DBT and CAD: September 8, 2017 - Check    In #: [de-identified]   2D/3D Procedure   3D views: MLO view(s) were taken of the right breast    2D views: CC and XCCL view(s) were taken of the right breast        Technologist: ROBBIN Noonan (ROBBIN)(M)   Prior study comparison: January 17, 2017, mammo diagnostic    bilateral W CAD performed at [address] [place] [address]  January 17, 2017, US breast right limited performed at [address] [place] [address]  July 18, 2016, mammo diagnostic right W   CAD performed at [address] [place] [address]  July 18, 2016, US breast right limited performed at [address] [place] [address]  January 13, 2016, US breast right limited    performed at [address] [place] [address]   January 13, 2016, mammo diagnostic right W CAD performed at [address] [place] [address]  January 8, 2016, digital bilateral    screening mammogram, performed at [address] [place] [address]  The breast tissue is heterogeneously dense, potentially limiting    the sensitivity of mammography  Patient risk, included in this    report, assists in determining the appropriate screening regimen    (such as 3-D mammography or the inclusion of automated breast    ultrasound or MRI)  3-D mammography may also remain indicated as    screening  US Breast Right Limited: September 8, 2017 - Check In #: [de-identified]   Standard views  Technologist: Jeffery Prasad RDMS   Heterogeneity can be either focal or diffuse  The breast    echotexture is characterized by multiple small areas of increased   and decreased echogenicity  Shadowing may occur at the    interfaces of the fat lobules and parenchyma  This pattern occurs   in younger breasts and those with heterogeneously dense    parenchyma depicted mammographically  Again identified is the    loose group of calcifications in the outer right breast, not    significantly changed from the 1st diagnostic examination    obtained January 13, 2016  No dominant masses are seen  There is a stable nodular asymmetry   in the outer posterior right breast  There is no architectural    distortion or skin thickening  The axillary regions are benign  Following diagnostic mammography, the patient was taken to the    ultrasound suite  RIGHT BREAST ULTRASOUND:     At the 10 o'clock position, 7 cm from the nipple, there is a    hypoechoic nodule which measures 1 x 0 4 x 0 8 cm  No internal    vascularity is identified  At the 10 o'clock position, 7 cm from the nipple, there is a    hypoechoic nodule measuring 0 5 x 0 3 x 0 8 cm  A 3rd nodule is seen at the 9 o'clock position, 6 cm from the    nipple, measuring 0 9 x 0 3 x 0 7 cm       The above-described nodules are stable when compared to the prior studies  No new nodules are identified  1  Stable right diagnostic mammogram and right breast    ultrasound  2  Bilateral diagnostic mammography as well as repeat right    breast ultrasound in 6 months is recommended to document    stability of the above-described probably benign findings  ACR BI-RADSï¾® Assessments: BiRad:3 - Probably Benign (Overall)   Right breast Right Diag Mamm: BiRad:3 - probably benign finding    in the right breast    Right breast Right Brst US: BiRad:3 - probably benign finding in    the right breast      Recommendation:   Follow-up diagnostic mammogram of both breasts in 6 months  Ultrasound of the right breast in 6 months  The patient is scheduled in a reminder system for screening    mammography  Transcription Location: [facility] 98: [id number]     Risk Value(s):   Tyrer-Cuzick 10 Year: 2 600%, Tyrer-Cuzick Lifetime: 12 600%,    Myriad Table: 1 5%, SUMMER 5 Year: 1 2%, NCI Lifetime: 12 7%   Signed by:   Saravanan Sol MD   9/8/17       Plan  Abnormal mammogram    · MAMMO DIAGNOSTIC BILATERAL W 3D & CAD; Status:Active; Requested for:05Mar2018;     · US BREAST RIGHT COMPLETE (ABUS);  Status:Hold For - Scheduling; Requested  for:05Mar2018;

## 2018-01-18 NOTE — RESULT NOTES
Message   Notiifed of results (spoke with )  Mailed slips to repeat mammogram and ultrasound of right breast in 6 months  Verified Results  MAMMO DIAGNOSTIC RIGHT W CAD [id number] 08:36AM Casey San     Test Name Result Flag Reference   [facility] DIAGNOSTIC RIGHT W CAD (Report)     Patient History:   Patient had first child at age 35  No known family history of cancer  Patient has never smoked  Patient's BMI is 22 9  Reason for exam: additional evaluation requested from abnormal    screening  Nodule and calcifications in the right breast on baseline    screening mammogram      Mammo Diagnostic Right W CAD: January 13, 2016 - Check In #:    [de-identified]   ML, spot compression MLO, CC with magnification, and ML with    magnification view(s) were taken of the right breast      Technologist: ROBBIN Pastrana (ROBBIN)(M)   Prior study comparison: January 8, 2016, digital bilateral    screening mammogram, performed at [address] [place] [address]  The breast tissue is heterogeneously dense, potentially limiting    the sensitivity of mammography  Patient risk, included in this    report, assists in determining the appropriate screening regimen    (such as 3-D mammography or the inclusion of automated breast    ultrasound or MRI)  3-D mammography may also remain indicated as    screening  Calcifications are seen scattered throughout both    breasts on the screening mammogram and calcifications identified    in the right breast on the current exam though slightly more    regionally clustered in the outer breast have similar morphology    to those calcifications seen elsewhere in both breasts and are    considered therefore probably benign  Therefore, six-month    diagnostic mammographic follow-up of the right breast    calcifications is recommended       Also noted is a sharply circumscribed nodular density in the    posterior upper outer right breast which does not dissipate on    spot compression mammographic views  Targeted right breast ultrasound reveals hypoechoic nodule which    is oval and wider than tall with sharply circumscribed margins    measuring 10 mm at the 10 o'clock position 7 cm from the nipple  A similar hypoechoic sharply circumscribed probably    benign-appearing 6 mm nodule is also noted at the 10 o'clock    position 7 cm from the nipple  Finally there is a simple    appearing cyst at the 9 o'clock position 6 cm from the nipple    measuring 7 mm  One of these 3 probably benign nodules almost    certainly corresponds to the nodular density seen on mammography  Impression: Probably benign findings in the right breast for    which six-month diagnostic mammogram and targeted right breast    ultrasound are recommended  US Breast Right Limited: January 13, 2016 - Check In #: [de-identified]   Technologist: Janelle Monte RDMS     ASSESSMENT: BiRad:3 - Probably Benign (Overall)     Recommendation:   Follow-up diagnostic mammogram and ultrasound of the right breast   in 6 months  Analyzed by CAD     Transcription Location: [facility]   Signing Station: [id number]     Risk Value(s):   Tyrer-Cuzick 10 Year: 2 360%, Tyrer-Cuzick Lifetime: 12 892%,    Myriad Table: 1 5%, SUMMER 5 Year: 1 1%, NCI Lifetime: 13 0%     US BREAST RIGHT LIMITED [id number] 08:36AM Rodrigo Ear     Test Name Result Flag Reference   US BREAST RIGHT LIMITED (Report)     Patient History:   Patient had first child at age 35  No known family history of cancer  Patient has never smoked  Patient's BMI is 22 9  Reason for exam: additional evaluation requested from abnormal    screening     Nodule and calcifications in the right breast on baseline    screening mammogram      Mammo Diagnostic Right W CAD: January 13, 2016 - Check In #:    [de-identified]   ML, spot compression MLO, CC with magnification, and ML with    magnification view(s) were taken of the right breast      Technologist: Taffy Carrel, R T (ROBBIN)(M)   Prior study comparison: January 8, 2016, digital bilateral    screening mammogram, performed at [address] [place] [address]  The breast tissue is heterogeneously dense, potentially limiting    the sensitivity of mammography  Patient risk, included in this    report, assists in determining the appropriate screening regimen    (such as 3-D mammography or the inclusion of automated breast    ultrasound or MRI)  3-D mammography may also remain indicated as    screening  Calcifications are seen scattered throughout both    breasts on the screening mammogram and calcifications identified    in the right breast on the current exam though slightly more    regionally clustered in the outer breast have similar morphology    to those calcifications seen elsewhere in both breasts and are    considered therefore probably benign  Therefore, six-month    diagnostic mammographic follow-up of the right breast    calcifications is recommended  Also noted is a sharply circumscribed nodular density in the    posterior upper outer right breast which does not dissipate on    spot compression mammographic views  Targeted right breast ultrasound reveals hypoechoic nodule which    is oval and wider than tall with sharply circumscribed margins    measuring 10 mm at the 10 o'clock position 7 cm from the nipple  A similar hypoechoic sharply circumscribed probably    benign-appearing 6 mm nodule is also noted at the 10 o'clock    position 7 cm from the nipple  Finally there is a simple    appearing cyst at the 9 o'clock position 6 cm from the nipple    measuring 7 mm  One of these 3 probably benign nodules almost    certainly corresponds to the nodular density seen on mammography  Impression: Probably benign findings in the right breast for    which six-month diagnostic mammogram and targeted right breast    ultrasound are recommended       US Breast Right Limited: January 13, 2016 - Check In #: [de-identified]   Technologist: Mayco Delacruz, RDMS     ASSESSMENT: BiRad:3 - Probably Benign (Overall)     Recommendation:   Follow-up diagnostic mammogram and ultrasound of the right breast   in 6 months  Analyzed by CAD     Transcription Location: [address]   Signing Station: [id number]     Risk Value(s):   Tyrer-Cuzick 10 Year: 2 360%, Tyrer-Cuzijaycee Lifetime: 12 892%,    Myriad Table: 1 5%, SUMMER 5 Year: 1 1%, NCI Lifetime: 13 0%       Plan  Abnormal mammogram    · MAMMO DIAGNOSTIC RIGHT W CAD; Status:Hold For - Scheduling; Requested  for:14Kve0100;    · [place]; Status:Hold For - Scheduling; Requested  for:[id number];     Discussion/Summary   BENIGN APPEARING NODULES OF RIGHT BREAST  REPEAT RIGHT BREAST MAMMOGRAM + ULTRASOUND IN 6 MONTHS
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
## 2018-01-29 ENCOUNTER — TELEPHONE (OUTPATIENT)
Dept: FAMILY MEDICINE CLINIC | Facility: OTHER | Age: 48
End: 2018-01-29

## 2018-01-29 DIAGNOSIS — F98.8 ATTENTION DEFICIT DISORDER, UNSPECIFIED HYPERACTIVITY PRESENCE: Primary | ICD-10-CM

## 2018-01-29 RX ORDER — DEXTROAMPHETAMINE SACCHARATE, AMPHETAMINE ASPARTATE MONOHYDRATE, DEXTROAMPHETAMINE SULFATE AND AMPHETAMINE SULFATE 7.5; 7.5; 7.5; 7.5 MG/1; MG/1; MG/1; MG/1
30 CAPSULE, EXTENDED RELEASE ORAL DAILY
Qty: 30 CAPSULE | Refills: 0 | Status: SHIPPED | OUTPATIENT
Start: 2018-01-29 | End: 2018-02-27 | Stop reason: SDUPTHER

## 2018-01-29 RX ORDER — ZOLPIDEM TARTRATE 5 MG/1
1 TABLET ORAL
COMMUNITY
Start: 2014-02-21 | End: 2018-02-12 | Stop reason: SDUPTHER

## 2018-01-29 RX ORDER — DEXTROAMPHETAMINE SACCHARATE, AMPHETAMINE ASPARTATE MONOHYDRATE, DEXTROAMPHETAMINE SULFATE AND AMPHETAMINE SULFATE 7.5; 7.5; 7.5; 7.5 MG/1; MG/1; MG/1; MG/1
1 CAPSULE, EXTENDED RELEASE ORAL DAILY
COMMUNITY
Start: 2017-08-31 | End: 2018-01-29 | Stop reason: SDUPTHER

## 2018-01-29 RX ORDER — LORAZEPAM 0.5 MG/1
1-2 TABLET ORAL 2 TIMES DAILY PRN
COMMUNITY
Start: 2016-01-07 | End: 2018-06-07

## 2018-02-12 DIAGNOSIS — G47.00 INSOMNIA, UNSPECIFIED TYPE: Primary | ICD-10-CM

## 2018-02-12 RX ORDER — ZOLPIDEM TARTRATE 5 MG/1
5 TABLET ORAL
Qty: 30 TABLET | Refills: 0 | OUTPATIENT
Start: 2018-02-12 | End: 2018-03-23 | Stop reason: SDUPTHER

## 2018-02-27 DIAGNOSIS — F98.8 ATTENTION DEFICIT DISORDER, UNSPECIFIED HYPERACTIVITY PRESENCE: ICD-10-CM

## 2018-02-27 RX ORDER — DEXTROAMPHETAMINE SACCHARATE, AMPHETAMINE ASPARTATE MONOHYDRATE, DEXTROAMPHETAMINE SULFATE AND AMPHETAMINE SULFATE 7.5; 7.5; 7.5; 7.5 MG/1; MG/1; MG/1; MG/1
30 CAPSULE, EXTENDED RELEASE ORAL DAILY
Qty: 30 CAPSULE | Refills: 0 | Status: SHIPPED | OUTPATIENT
Start: 2018-02-27 | End: 2018-03-23 | Stop reason: SDUPTHER

## 2018-03-05 DIAGNOSIS — R92.8 OTHER ABNORMAL AND INCONCLUSIVE FINDINGS ON DIAGNOSTIC IMAGING OF BREAST: ICD-10-CM

## 2018-03-08 ENCOUNTER — HOSPITAL ENCOUNTER (OUTPATIENT)
Dept: MAMMOGRAPHY | Facility: CLINIC | Age: 48
Discharge: HOME | End: 2018-03-08
Payer: COMMERCIAL

## 2018-03-23 ENCOUNTER — HOSPITAL ENCOUNTER (OUTPATIENT)
Dept: ULTRASOUND IMAGING | Facility: CLINIC | Age: 48
Discharge: HOME | End: 2018-03-23
Payer: COMMERCIAL

## 2018-03-23 ENCOUNTER — HOSPITAL ENCOUNTER (OUTPATIENT)
Dept: MAMMOGRAPHY | Facility: CLINIC | Age: 48
Discharge: HOME | End: 2018-03-23
Payer: COMMERCIAL

## 2018-03-23 DIAGNOSIS — R92.8 ABNORMAL MAMMOGRAM: Primary | ICD-10-CM

## 2018-03-23 DIAGNOSIS — G47.00 INSOMNIA, UNSPECIFIED TYPE: ICD-10-CM

## 2018-03-23 DIAGNOSIS — R92.8 OTHER ABNORMAL AND INCONCLUSIVE FINDINGS ON DIAGNOSTIC IMAGING OF BREAST: ICD-10-CM

## 2018-03-23 DIAGNOSIS — F98.8 ATTENTION DEFICIT DISORDER, UNSPECIFIED HYPERACTIVITY PRESENCE: ICD-10-CM

## 2018-03-23 PROCEDURE — G0279 TOMOSYNTHESIS, MAMMO: HCPCS

## 2018-03-23 PROCEDURE — 77066 DX MAMMO INCL CAD BI: CPT

## 2018-03-23 PROCEDURE — 76642 ULTRASOUND BREAST LIMITED: CPT

## 2018-03-23 RX ORDER — DEXTROAMPHETAMINE SACCHARATE, AMPHETAMINE ASPARTATE MONOHYDRATE, DEXTROAMPHETAMINE SULFATE AND AMPHETAMINE SULFATE 7.5; 7.5; 7.5; 7.5 MG/1; MG/1; MG/1; MG/1
30 CAPSULE, EXTENDED RELEASE ORAL DAILY
Qty: 30 CAPSULE | Refills: 0 | Status: SHIPPED | OUTPATIENT
Start: 2018-03-23 | End: 2018-04-27 | Stop reason: SDUPTHER

## 2018-03-23 RX ORDER — ZOLPIDEM TARTRATE 5 MG/1
5 TABLET ORAL
Qty: 30 TABLET | Refills: 0 | Status: SHIPPED | OUTPATIENT
Start: 2018-03-23 | End: 2018-06-07 | Stop reason: SDUPTHER

## 2018-04-17 DIAGNOSIS — F32.A DEPRESSION, UNSPECIFIED DEPRESSION TYPE: Primary | ICD-10-CM

## 2018-04-17 RX ORDER — ESCITALOPRAM OXALATE 20 MG/1
TABLET ORAL
Qty: 30 TABLET | Refills: 1 | Status: SHIPPED | OUTPATIENT
Start: 2018-04-17 | End: 2018-05-17 | Stop reason: SDUPTHER

## 2018-04-27 DIAGNOSIS — F98.8 ATTENTION DEFICIT DISORDER, UNSPECIFIED HYPERACTIVITY PRESENCE: ICD-10-CM

## 2018-04-27 RX ORDER — DEXTROAMPHETAMINE SACCHARATE, AMPHETAMINE ASPARTATE MONOHYDRATE, DEXTROAMPHETAMINE SULFATE AND AMPHETAMINE SULFATE 7.5; 7.5; 7.5; 7.5 MG/1; MG/1; MG/1; MG/1
30 CAPSULE, EXTENDED RELEASE ORAL DAILY
Qty: 30 CAPSULE | Refills: 0 | Status: SHIPPED | OUTPATIENT
Start: 2018-04-27 | End: 2018-05-29 | Stop reason: SDUPTHER

## 2018-05-17 ENCOUNTER — TELEPHONE (OUTPATIENT)
Dept: FAMILY MEDICINE CLINIC | Facility: OTHER | Age: 48
End: 2018-05-17

## 2018-05-17 DIAGNOSIS — F32.A DEPRESSION, UNSPECIFIED DEPRESSION TYPE: ICD-10-CM

## 2018-05-17 RX ORDER — ESCITALOPRAM OXALATE 20 MG/1
20 TABLET ORAL DAILY
Qty: 90 TABLET | Refills: 1 | Status: SHIPPED | OUTPATIENT
Start: 2018-05-17 | End: 2018-11-15 | Stop reason: SDUPTHER

## 2018-05-21 DIAGNOSIS — D64.9 ANEMIA: ICD-10-CM

## 2018-05-29 DIAGNOSIS — F98.8 ATTENTION DEFICIT DISORDER, UNSPECIFIED HYPERACTIVITY PRESENCE: ICD-10-CM

## 2018-05-29 RX ORDER — DEXTROAMPHETAMINE SACCHARATE, AMPHETAMINE ASPARTATE MONOHYDRATE, DEXTROAMPHETAMINE SULFATE AND AMPHETAMINE SULFATE 7.5; 7.5; 7.5; 7.5 MG/1; MG/1; MG/1; MG/1
30 CAPSULE, EXTENDED RELEASE ORAL DAILY
Qty: 30 CAPSULE | Refills: 0 | Status: SHIPPED | OUTPATIENT
Start: 2018-05-29 | End: 2018-05-29 | Stop reason: SDUPTHER

## 2018-05-29 RX ORDER — DEXTROAMPHETAMINE SACCHARATE, AMPHETAMINE ASPARTATE MONOHYDRATE, DEXTROAMPHETAMINE SULFATE AND AMPHETAMINE SULFATE 7.5; 7.5; 7.5; 7.5 MG/1; MG/1; MG/1; MG/1
30 CAPSULE, EXTENDED RELEASE ORAL DAILY
Qty: 30 CAPSULE | Refills: 0 | Status: SHIPPED | OUTPATIENT
Start: 2018-05-29 | End: 2018-06-26 | Stop reason: SDUPTHER

## 2018-06-05 ENCOUNTER — APPOINTMENT (OUTPATIENT)
Dept: LAB | Facility: CLINIC | Age: 48
End: 2018-06-05
Payer: COMMERCIAL

## 2018-06-05 DIAGNOSIS — D64.9 ANEMIA: ICD-10-CM

## 2018-06-05 LAB
BASOPHILS # BLD AUTO: 0.01 THOUSANDS/ΜL (ref 0–0.1)
BASOPHILS NFR BLD AUTO: 0 % (ref 0–1)
EOSINOPHIL # BLD AUTO: 0.28 THOUSAND/ΜL (ref 0–0.61)
EOSINOPHIL NFR BLD AUTO: 4 % (ref 0–6)
ERYTHROCYTE [DISTWIDTH] IN BLOOD BY AUTOMATED COUNT: 13.2 % (ref 11.6–15.1)
FERRITIN SERPL-MCNC: 9 NG/ML (ref 8–388)
HCT VFR BLD AUTO: 32.2 % (ref 34.8–46.1)
HGB BLD-MCNC: 10.7 G/DL (ref 11.5–15.4)
IRON SATN MFR SERPL: 24 %
IRON SERPL-MCNC: 88 UG/DL (ref 50–170)
LYMPHOCYTES # BLD AUTO: 2.24 THOUSANDS/ΜL (ref 0.6–4.47)
LYMPHOCYTES NFR BLD AUTO: 35 % (ref 14–44)
MCH RBC QN AUTO: 30.7 PG (ref 26.8–34.3)
MCHC RBC AUTO-ENTMCNC: 33.2 G/DL (ref 31.4–37.4)
MCV RBC AUTO: 93 FL (ref 82–98)
MONOCYTES # BLD AUTO: 0.52 THOUSAND/ΜL (ref 0.17–1.22)
MONOCYTES NFR BLD AUTO: 8 % (ref 4–12)
NEUTROPHILS # BLD AUTO: 3.38 THOUSANDS/ΜL (ref 1.85–7.62)
NEUTS SEG NFR BLD AUTO: 53 % (ref 43–75)
PLATELET # BLD AUTO: 257 THOUSANDS/UL (ref 149–390)
PMV BLD AUTO: 9.7 FL (ref 8.9–12.7)
RBC # BLD AUTO: 3.48 MILLION/UL (ref 3.81–5.12)
TIBC SERPL-MCNC: 366 UG/DL (ref 250–450)
WBC # BLD AUTO: 6.43 THOUSAND/UL (ref 4.31–10.16)

## 2018-06-05 PROCEDURE — 83550 IRON BINDING TEST: CPT

## 2018-06-05 PROCEDURE — 36415 COLL VENOUS BLD VENIPUNCTURE: CPT

## 2018-06-05 PROCEDURE — 82728 ASSAY OF FERRITIN: CPT

## 2018-06-05 PROCEDURE — 83540 ASSAY OF IRON: CPT

## 2018-06-05 PROCEDURE — 85025 COMPLETE CBC W/AUTO DIFF WBC: CPT

## 2018-06-07 ENCOUNTER — OFFICE VISIT (OUTPATIENT)
Dept: FAMILY MEDICINE CLINIC | Facility: OTHER | Age: 48
End: 2018-06-07
Payer: COMMERCIAL

## 2018-06-07 VITALS
BODY MASS INDEX: 19.3 KG/M2 | TEMPERATURE: 98 F | DIASTOLIC BLOOD PRESSURE: 68 MMHG | OXYGEN SATURATION: 98 % | SYSTOLIC BLOOD PRESSURE: 114 MMHG | HEART RATE: 106 BPM | WEIGHT: 102.25 LBS | HEIGHT: 61 IN

## 2018-06-07 DIAGNOSIS — D64.9 BORDERLINE ANEMIA: ICD-10-CM

## 2018-06-07 DIAGNOSIS — F41.1 GENERALIZED ANXIETY DISORDER: ICD-10-CM

## 2018-06-07 DIAGNOSIS — F33.42 RECURRENT MAJOR DEPRESSIVE DISORDER, IN FULL REMISSION (HCC): Primary | ICD-10-CM

## 2018-06-07 DIAGNOSIS — F90.0 ATTENTION DEFICIT HYPERACTIVITY DISORDER (ADHD), PREDOMINANTLY INATTENTIVE TYPE: ICD-10-CM

## 2018-06-07 DIAGNOSIS — Z12.4 CERVICAL CANCER SCREENING: ICD-10-CM

## 2018-06-07 DIAGNOSIS — G47.00 INSOMNIA, UNSPECIFIED TYPE: ICD-10-CM

## 2018-06-07 PROBLEM — F98.8 ATTENTION DEFICIT DISORDER (ADD): Status: ACTIVE | Noted: 2017-08-14

## 2018-06-07 PROCEDURE — 99213 OFFICE O/P EST LOW 20 MIN: CPT | Performed by: FAMILY MEDICINE

## 2018-06-07 RX ORDER — ZOLPIDEM TARTRATE 5 MG/1
5 TABLET ORAL
Qty: 30 TABLET | Refills: 0 | Status: SHIPPED | OUTPATIENT
Start: 2018-06-07 | End: 2018-08-13 | Stop reason: SDUPTHER

## 2018-06-07 NOTE — PROGRESS NOTES
Subjective:      Patient ID: Gisel Barrera is a 52 y o  female  HPI   Patient presents for routine follow-up and medication check  States she is feeling well at this time, denies any medication problems or concerns  Has been well maintained on Adderall XR 30 mg daily for inattentive type ADD  Reports better focus at work, less distractibility  Also doing well on Lexapro 20 mg daily for anxiety and depression  Patient states that since starting this medication she has noticed a huge improvement in her mental health  No longer feeling irritable, overwhelmed  Does, occasionally, have difficulty with insomnia--uses Ambien 5 mg as needed with good response    Patient also here to review blood work  Diagnosed with borderline anemia, possibly due to iron deficiency  Had blood work done last week to recheck CBC and iron level  Denies chest pain, shortness of breath, fatigue, pallor, acute blood loss  No hematochezia or melena noted; menses have recently been heavy        The following portions of the patient's history were reviewed and updated as appropriate: allergies, current medications, past family history, past medical history, past social history, past surgical history and problem list       Current Outpatient Prescriptions:     amphetamine-dextroamphetamine (ADDERALL XR) 30 MG 24 hr capsule, Take 1 capsule (30 mg total) by mouth daily Max Daily Amount: 30 mg, Disp: 30 capsule, Rfl: 0    escitalopram (LEXAPRO) 20 mg tablet, Take 1 tablet (20 mg total) by mouth daily, Disp: 90 tablet, Rfl: 1    zolpidem (AMBIEN) 5 mg tablet, Take 1 tablet (5 mg total) by mouth daily at bedtime as needed for sleep, Disp: 30 tablet, Rfl: 0      Review of Systems   Constitutional: Negative for activity change, fatigue and fever  HENT: Negative for congestion, ear pain, sinus pain and sore throat  Eyes: Negative for pain and itching  Respiratory: Negative for cough and shortness of breath      Cardiovascular: Negative for chest pain and palpitations  Gastrointestinal: Negative for abdominal pain, constipation, diarrhea, nausea and vomiting  Endocrine: Negative for cold intolerance and heat intolerance  Genitourinary: Negative for dysuria  Musculoskeletal: Negative for myalgias  Skin: Negative for color change and rash  Neurological: Negative for dizziness, syncope and headaches  Hematological: Negative for adenopathy  Psychiatric/Behavioral: Negative for behavioral problems, dysphoric mood and sleep disturbance  The patient is not nervous/anxious  Objective:      /68 (BP Location: Left arm, Patient Position: Sitting, Cuff Size: Adult)   Pulse (!) 106   Temp 98 °F (36 7 °C) (Tympanic)   Ht 5' 1 25" (1 556 m)   Wt 46 4 kg (102 lb 4 oz)   SpO2 98%   BMI 19 16 kg/m²          Physical Exam   Constitutional: She is oriented to person, place, and time  She appears well-developed and well-nourished  No distress  HENT:   Head: Normocephalic and atraumatic  Right Ear: External ear normal    Left Ear: External ear normal    Nose: Nose normal    Mouth/Throat: Oropharynx is clear and moist    Eyes: Conjunctivae and EOM are normal  Pupils are equal, round, and reactive to light  No scleral icterus  Neck: Normal range of motion  Neck supple  No thyromegaly present  Cardiovascular: Normal rate, regular rhythm and normal heart sounds  Pulmonary/Chest: Effort normal and breath sounds normal  No respiratory distress  Abdominal: Soft  Bowel sounds are normal  There is no tenderness  Musculoskeletal: Normal range of motion  She exhibits no edema  Lymphadenopathy:     She has no cervical adenopathy  Neurological: She is alert and oriented to person, place, and time  No cranial nerve deficit  Skin: Skin is warm and dry  No rash noted  Psychiatric: She has a normal mood and affect   Her behavior is normal        Lab Results   Component Value Date    WBC 6 43 06/05/2018    HGB 10 7 (L) 06/05/2018 HCT 32 2 (L) 06/05/2018    MCV 93 06/05/2018     06/05/2018     Lab Results   Component Value Date    IRON 88 06/05/2018    TIBC 366 06/05/2018    FERRITIN 9 06/05/2018         Assessment/Plan:   Diagnoses and all orders for this visit:    Recurrent major depressive disorder, in full remission ([facility] [address] )  Generalized anxiety disorder        -     stable, continue Lexapro 20 mg daily    Attention deficit hyperactivity disorder (ADHD), predominantly inattentive type        -     stable, continue Adderall 30 mg daily    Borderline anemia  -     Occult Bloood,Fecal Immunochemical; Future  -     CBC; Future  -     Iron Saturation %; Future  -     H/H worsening in the setting of stable iron--FIT testing ordered to evaluate for underlying GI issue    Insomnia, unspecified type  -     zolpidem (AMBIEN) 5 mg tablet; Take 1 tablet (5 mg total) by mouth daily at bedtime as needed for sleep    Cervical cancer screening  -     Ambulatory referral to Gynecology; Future        RTO 4 months for anemia follow-up, sooner for any new issues or complications  The patient indicates understanding of these issues and agrees with the plan        Henrique Hendrix DO

## 2018-06-08 ENCOUNTER — TELEPHONE (OUTPATIENT)
Dept: FAMILY MEDICINE CLINIC | Facility: OTHER | Age: 48
End: 2018-06-08

## 2018-06-08 ENCOUNTER — APPOINTMENT (OUTPATIENT)
Dept: LAB | Facility: CLINIC | Age: 48
End: 2018-06-08
Payer: COMMERCIAL

## 2018-06-08 DIAGNOSIS — D64.9 BORDERLINE ANEMIA: ICD-10-CM

## 2018-06-08 LAB — HEMOCCULT STL QL IA: NEGATIVE

## 2018-06-08 PROCEDURE — G0328 FECAL BLOOD SCRN IMMUNOASSAY: HCPCS

## 2018-06-11 ENCOUNTER — TELEPHONE (OUTPATIENT)
Dept: FAMILY MEDICINE CLINIC | Facility: OTHER | Age: 48
End: 2018-06-11

## 2018-06-11 NOTE — TELEPHONE ENCOUNTER
Unable to leave message on voicemail    ----- Message from Chaim Madrigal DO sent at 6/11/2018  7:35 AM EDT -----  Please inform pt that FIT testing was negative, this correlates to a LOW suspicion for colon cancer    Thanks!   Chaim Madrigal DO

## 2018-06-13 ENCOUNTER — OFFICE VISIT (OUTPATIENT)
Dept: OBGYN CLINIC | Facility: CLINIC | Age: 48
End: 2018-06-13
Payer: COMMERCIAL

## 2018-06-13 VITALS
DIASTOLIC BLOOD PRESSURE: 72 MMHG | SYSTOLIC BLOOD PRESSURE: 110 MMHG | BODY MASS INDEX: 18.95 KG/M2 | WEIGHT: 103 LBS | HEIGHT: 62 IN

## 2018-06-13 DIAGNOSIS — Z12.4 CERVICAL CANCER SCREENING: ICD-10-CM

## 2018-06-13 DIAGNOSIS — Z01.419 ENCNTR FOR GYN EXAM (GENERAL) (ROUTINE) W/O ABN FINDINGS: Primary | ICD-10-CM

## 2018-06-13 PROCEDURE — 99386 PREV VISIT NEW AGE 40-64: CPT | Performed by: NURSE PRACTITIONER

## 2018-06-13 NOTE — PROGRESS NOTES
Assessment/Plan   Diagnoses and all orders for this visit:    Encntr for gyn exam (general) (routine) w/o abn findings  -     GP Liquid-Based Pap + HPV Plus    Cervical cancer screening  -     Ambulatory referral to Gynecology  -     GP Liquid-Based Pap + HPV Plus        Discussion    Reviewed normal exam today  Pap with HPV done today  Normal breast exam today  Monthly SBEs advised  Mammograms yearly  Encourage at least 1200 mg calcium citrate + 2000 IUs vitamin D3 divided through diet and supplement throughout the day  Encourage 30-40 min weight bearing exercise most days of week  Reviewed 600mg Motrin Q 6 hours to help with heavy painful menses  If heaviness becomes worse may send for pelvic ultrasound  All questions have been answered to her satisfaction  RTO for annual or sooner if needed    Subjective     Mary Lou Pooja is a 52 y o  female new patient who presents for annual well woman exam     Last exam about 10 years ago Pap normal, denies any hx of abnormal pap smears or hx of HPV  Pap guidelines reviewed with patient  Pt would like Pap today  Pt denies any abnormal vaginal discharge, itching, or odor  Pt in a mutually exclusive relationship () with a male partner and denies the need for STD testing today  Menstrual Cycle:  LMP: 18   Period Cycle (Days): 30  Period Duration (Days): 5  Period Pattern: Regular  Menstrual Flow: Heavy  Menstrual Control: Maxi pad, Tampon  Menstrual Control Change Freq (Hours): 1-2  Dysmenorrhea: (!) Mild  Dysmenorrhea Symptoms: Cramping  OB History      Para Term  AB Living    3 2 0 2 1 2    SAB TAB Ectopic Multiple Live Births  Contraception: Male condoms  Practices monthly SBEs, no breast complaints today  Last Mammogram 3/23/18 currently getting breast u/s every 6 months to follow a cyst found in left breast    Denies any bowel or bladder issues  Pt follows with PCP for regular check-ups and blood work             Review of Systems All other systems reviewed and are negative        The following portions of the patient's history were reviewed and updated as appropriate: allergies, current medications, past family history, past medical history, past social history, past surgical history and problem list       Past Medical History:   Diagnosis Date    Abnormal mammogram     last assessed: 08/14/17    Arthritis     Attention deficit disorder (ADD)     last assessed: 11/30/17    Benign essential hypertension     last assessed: 08/04/16    Borderline anemia     last assessed: 11/30/17    Depression     last assessed: 11/30/17    Generalized anxiety disorder     High serum renin     High serum renin     last assessed: 01/07/16    Hyperaldosteronism ([address] )     last assessed: 01/07/16    Hypertension     Hypokalemia     last assessed: 01/07/16    Insomnia     Left knee pain     Major depressive disorder     Microscopic hematuria     last assessed: 08/14/17    Patellofemoral arthritis of left knee     last assessed: 05/18/15    Pes anserine bursitis     last assessed: 11/12/15    Pregnancy induced hypertension     PTSD (post-traumatic stress disorder)        Past Surgical History:   Procedure Laterality Date    DENTAL SURGERY      HERNIA REPAIR         Family History   Problem Relation Age of Onset    Diabetes Father     Cancer Paternal Grandmother        Social History     Social History    Marital status: /Civil Union     Spouse name: N/A    Number of children: N/A    Years of education: Bachelors degree     Occupational History    Licensing executive      Social History Main Topics    Smoking status: Never Smoker    Smokeless tobacco: Never Used    Alcohol use Yes      Comment: Socially    Drug use: No    Sexual activity: Yes     Partners: Male     Birth control/ protection: Condom Male     Other Topics Concern    Not on file     Social History Narrative    History of bereavement    2 children Current Outpatient Prescriptions:     amphetamine-dextroamphetamine (ADDERALL XR) 30 MG 24 hr capsule, Take 1 capsule (30 mg total) by mouth daily Max Daily Amount: 30 mg, Disp: 30 capsule, Rfl: 0    escitalopram (LEXAPRO) 20 mg tablet, Take 1 tablet (20 mg total) by mouth daily, Disp: 90 tablet, Rfl: 1    zolpidem (AMBIEN) 5 mg tablet, Take 1 tablet (5 mg total) by mouth daily at bedtime as needed for sleep, Disp: 30 tablet, Rfl: 0    No Known Allergies    Objective   Vitals:    06/13/18 0732   BP: 110/72   BP Location: Left arm   Patient Position: Sitting   Cuff Size: Standard   Weight: 46 7 kg (103 lb)   Height: 5' 2" (1 575 m)     Physical Exam   Constitutional: She is oriented to person, place, and time  She appears well-developed and well-nourished  HENT:   Head: Normocephalic  Neck: Normal range of motion  Neck supple  No tracheal deviation present  No thyromegaly present  Cardiovascular: Normal rate, regular rhythm and normal heart sounds  Pulmonary/Chest: Effort normal and breath sounds normal  Right breast exhibits no inverted nipple, no mass, no nipple discharge, no skin change and no tenderness  Left breast exhibits no inverted nipple, no mass, no nipple discharge, no skin change and no tenderness  Breasts are symmetrical    Abdominal: Soft  Bowel sounds are normal  She exhibits no distension and no mass  There is no tenderness  There is no rebound and no guarding  Genitourinary: Vagina normal and uterus normal  Rectal exam shows guaiac negative stool  No breast swelling, tenderness, discharge or bleeding  No labial fusion  There is no rash, tenderness, lesion or injury on the right labia  There is no rash, tenderness, lesion or injury on the left labia  Cervix exhibits no motion tenderness, no discharge and no friability  Right adnexum displays no mass, no tenderness and no fullness  Left adnexum displays no mass, no tenderness and no fullness     Musculoskeletal: Normal range of motion  Neurological: She is alert and oriented to person, place, and time  Skin: Skin is warm and dry  Psychiatric: She has a normal mood and affect   Her behavior is normal  Judgment and thought content normal

## 2018-06-18 LAB
HPV HR 12 DNA CVX QL NAA+PROBE: NOT DETECTED
HPV16 DNA SPEC QL NAA+PROBE: NOT DETECTED
HPV18 DNA SPEC QL NAA+PROBE: NOT DETECTED
THIN PREP CVX: NORMAL

## 2018-06-26 DIAGNOSIS — F98.8 ATTENTION DEFICIT DISORDER, UNSPECIFIED HYPERACTIVITY PRESENCE: ICD-10-CM

## 2018-06-26 RX ORDER — DEXTROAMPHETAMINE SACCHARATE, AMPHETAMINE ASPARTATE MONOHYDRATE, DEXTROAMPHETAMINE SULFATE AND AMPHETAMINE SULFATE 7.5; 7.5; 7.5; 7.5 MG/1; MG/1; MG/1; MG/1
30 CAPSULE, EXTENDED RELEASE ORAL DAILY
Qty: 30 CAPSULE | Refills: 0 | Status: SHIPPED | OUTPATIENT
Start: 2018-06-26 | End: 2018-07-28 | Stop reason: SDUPTHER

## 2018-06-26 NOTE — TELEPHONE ENCOUNTER
From: Tara Cox  Sent: 6/26/2018 8:45 AM EDT  Subject: Medication Renewal Request    Markel Wilder  Asiabess Caprice would like a refill of the following medications:     amphetamine-dextroamphetamine (ADDERALL XR) 30 MG 24 hr capsule MARIAMA Fung]    Preferred pharmacy: [facility] #[id number]    Comment:  Hi, can I  the paper prescription this week so I can have it filled in time for July? I will then bring it to [facility] to be filled

## 2018-07-28 DIAGNOSIS — F98.8 ATTENTION DEFICIT DISORDER, UNSPECIFIED HYPERACTIVITY PRESENCE: ICD-10-CM

## 2018-07-30 RX ORDER — DEXTROAMPHETAMINE SACCHARATE, AMPHETAMINE ASPARTATE MONOHYDRATE, DEXTROAMPHETAMINE SULFATE AND AMPHETAMINE SULFATE 7.5; 7.5; 7.5; 7.5 MG/1; MG/1; MG/1; MG/1
30 CAPSULE, EXTENDED RELEASE ORAL DAILY
Qty: 30 CAPSULE | Refills: 0 | Status: SHIPPED | OUTPATIENT
Start: 2018-07-30 | End: 2018-08-29 | Stop reason: SDUPTHER

## 2018-08-13 DIAGNOSIS — G47.00 INSOMNIA, UNSPECIFIED TYPE: ICD-10-CM

## 2018-08-14 RX ORDER — ZOLPIDEM TARTRATE 5 MG/1
5 TABLET ORAL
Qty: 15 TABLET | Refills: 0 | Status: SHIPPED | OUTPATIENT
Start: 2018-08-14 | End: 2018-09-14 | Stop reason: SDUPTHER

## 2018-08-14 NOTE — TELEPHONE ENCOUNTER
From: Yo Contreras  Sent: 8/13/2018 10:47 PM EDT  Subject: Medication Renewal Request    Fawad Merino would like a refill of the following medications:     zolpidem (AMBIEN) 5 mg tablet CAILIN Tobias    Preferred pharmacy: [facility]Y #[id number]    Comment:

## 2018-08-27 DIAGNOSIS — F98.8 ATTENTION DEFICIT DISORDER, UNSPECIFIED HYPERACTIVITY PRESENCE: ICD-10-CM

## 2018-08-27 RX ORDER — DEXTROAMPHETAMINE SACCHARATE, AMPHETAMINE ASPARTATE MONOHYDRATE, DEXTROAMPHETAMINE SULFATE AND AMPHETAMINE SULFATE 7.5; 7.5; 7.5; 7.5 MG/1; MG/1; MG/1; MG/1
30 CAPSULE, EXTENDED RELEASE ORAL DAILY
Qty: 30 CAPSULE | Refills: 0 | Status: CANCELLED | OUTPATIENT
Start: 2018-08-27

## 2018-08-29 DIAGNOSIS — F98.8 ATTENTION DEFICIT DISORDER, UNSPECIFIED HYPERACTIVITY PRESENCE: ICD-10-CM

## 2018-08-29 RX ORDER — DEXTROAMPHETAMINE SACCHARATE, AMPHETAMINE ASPARTATE MONOHYDRATE, DEXTROAMPHETAMINE SULFATE AND AMPHETAMINE SULFATE 7.5; 7.5; 7.5; 7.5 MG/1; MG/1; MG/1; MG/1
30 CAPSULE, EXTENDED RELEASE ORAL DAILY
Qty: 30 CAPSULE | Refills: 0 | Status: SHIPPED | OUTPATIENT
Start: 2018-08-29 | End: 2018-09-25 | Stop reason: SDUPTHER

## 2018-08-29 RX ORDER — DEXTROAMPHETAMINE SACCHARATE, AMPHETAMINE ASPARTATE MONOHYDRATE, DEXTROAMPHETAMINE SULFATE AND AMPHETAMINE SULFATE 7.5; 7.5; 7.5; 7.5 MG/1; MG/1; MG/1; MG/1
30 CAPSULE, EXTENDED RELEASE ORAL DAILY
Qty: 30 CAPSULE | Refills: 0 | Status: CANCELLED | OUTPATIENT
Start: 2018-08-29

## 2018-09-12 DIAGNOSIS — G47.00 INSOMNIA, UNSPECIFIED TYPE: ICD-10-CM

## 2018-09-12 RX ORDER — ZOLPIDEM TARTRATE 5 MG/1
5 TABLET ORAL
Qty: 15 TABLET | Refills: 0 | OUTPATIENT
Start: 2018-09-12

## 2018-09-14 DIAGNOSIS — G47.00 INSOMNIA, UNSPECIFIED TYPE: ICD-10-CM

## 2018-09-14 RX ORDER — ZOLPIDEM TARTRATE 5 MG/1
5 TABLET ORAL
Qty: 30 TABLET | Refills: 0 | Status: SHIPPED | OUTPATIENT
Start: 2018-09-14 | End: 2018-10-16 | Stop reason: SDUPTHER

## 2018-09-25 DIAGNOSIS — F98.8 ATTENTION DEFICIT DISORDER, UNSPECIFIED HYPERACTIVITY PRESENCE: ICD-10-CM

## 2018-09-25 RX ORDER — DEXTROAMPHETAMINE SACCHARATE, AMPHETAMINE ASPARTATE MONOHYDRATE, DEXTROAMPHETAMINE SULFATE AND AMPHETAMINE SULFATE 7.5; 7.5; 7.5; 7.5 MG/1; MG/1; MG/1; MG/1
30 CAPSULE, EXTENDED RELEASE ORAL DAILY
Qty: 30 CAPSULE | Refills: 0 | Status: SHIPPED | OUTPATIENT
Start: 2018-09-25 | End: 2018-10-25 | Stop reason: SDUPTHER

## 2018-10-16 ENCOUNTER — OFFICE VISIT (OUTPATIENT)
Dept: FAMILY MEDICINE CLINIC | Facility: OTHER | Age: 48
End: 2018-10-16
Payer: COMMERCIAL

## 2018-10-16 VITALS
HEART RATE: 91 BPM | OXYGEN SATURATION: 99 % | BODY MASS INDEX: 19.14 KG/M2 | SYSTOLIC BLOOD PRESSURE: 104 MMHG | TEMPERATURE: 97.6 F | HEIGHT: 62 IN | WEIGHT: 104 LBS | DIASTOLIC BLOOD PRESSURE: 66 MMHG

## 2018-10-16 DIAGNOSIS — F41.1 GENERALIZED ANXIETY DISORDER: ICD-10-CM

## 2018-10-16 DIAGNOSIS — F32.A DEPRESSION, UNSPECIFIED DEPRESSION TYPE: ICD-10-CM

## 2018-10-16 DIAGNOSIS — Z28.20 VACCINE REFUSED BY PATIENT: ICD-10-CM

## 2018-10-16 DIAGNOSIS — G47.00 INSOMNIA, UNSPECIFIED TYPE: Primary | ICD-10-CM

## 2018-10-16 DIAGNOSIS — Z28.21 REFUSED INFLUENZA VACCINE: ICD-10-CM

## 2018-10-16 PROCEDURE — 99213 OFFICE O/P EST LOW 20 MIN: CPT | Performed by: FAMILY MEDICINE

## 2018-10-16 PROCEDURE — 1036F TOBACCO NON-USER: CPT | Performed by: FAMILY MEDICINE

## 2018-10-16 PROCEDURE — 3008F BODY MASS INDEX DOCD: CPT | Performed by: FAMILY MEDICINE

## 2018-10-16 RX ORDER — ZOLPIDEM TARTRATE 5 MG/1
5 TABLET ORAL
Qty: 30 TABLET | Refills: 0 | Status: SHIPPED | OUTPATIENT
Start: 2018-10-16 | End: 2018-11-12 | Stop reason: SDUPTHER

## 2018-10-17 ENCOUNTER — TELEPHONE (OUTPATIENT)
Dept: FAMILY MEDICINE CLINIC | Facility: OTHER | Age: 48
End: 2018-10-17

## 2018-10-17 LAB
BASOPHILS # BLD AUTO: 18 CELLS/UL (ref 0–200)
BASOPHILS NFR BLD AUTO: 0.4 %
EOSINOPHIL # BLD AUTO: 189 CELLS/UL (ref 15–500)
EOSINOPHIL NFR BLD AUTO: 4.1 %
ERYTHROCYTE [DISTWIDTH] IN BLOOD BY AUTOMATED COUNT: 13.9 % (ref 11–15)
HCT VFR BLD AUTO: 32.2 % (ref 35–45)
HGB BLD-MCNC: 11 G/DL (ref 11.7–15.5)
IRON SATN MFR SERPL: 13 % (CALC) (ref 11–50)
IRON SERPL-MCNC: 46 MCG/DL (ref 40–190)
LYMPHOCYTES # BLD AUTO: 1918 CELLS/UL (ref 850–3900)
LYMPHOCYTES NFR BLD AUTO: 41.7 %
MCH RBC QN AUTO: 31.5 PG (ref 27–33)
MCHC RBC AUTO-ENTMCNC: 34.2 G/DL (ref 32–36)
MCV RBC AUTO: 92.3 FL (ref 80–100)
MONOCYTES # BLD AUTO: 281 CELLS/UL (ref 200–950)
MONOCYTES NFR BLD AUTO: 6.1 %
NEUTROPHILS # BLD AUTO: 2194 CELLS/UL (ref 1500–7800)
NEUTROPHILS NFR BLD AUTO: 47.7 %
PLATELET # BLD AUTO: 196 THOUSAND/UL (ref 140–400)
PMV BLD REES-ECKER: 10.6 FL (ref 7.5–12.5)
RBC # BLD AUTO: 3.49 MILLION/UL (ref 3.8–5.1)
TIBC SERPL-MCNC: 346 MCG/DL (CALC) (ref 250–450)
WBC # BLD AUTO: 4.6 THOUSAND/UL (ref 3.8–10.8)

## 2018-10-17 NOTE — TELEPHONE ENCOUNTER
Left message for patient to return call    ----- Message from Ebony Persaud DO sent at 10/17/2018 10:24 AM EDT -----  Please inform pt that blood count remains slightly low, but STABLE  Iron level has decreased  Recommend she take OTC iron supplement (Ferrous sulfate 325mg daily)  Follow up with any questions or concerns  Will recheck labs at next visit  Thanks!   Ebony Persaud DO

## 2018-10-25 DIAGNOSIS — F98.8 ATTENTION DEFICIT DISORDER, UNSPECIFIED HYPERACTIVITY PRESENCE: ICD-10-CM

## 2018-10-25 RX ORDER — DEXTROAMPHETAMINE SACCHARATE, AMPHETAMINE ASPARTATE MONOHYDRATE, DEXTROAMPHETAMINE SULFATE AND AMPHETAMINE SULFATE 7.5; 7.5; 7.5; 7.5 MG/1; MG/1; MG/1; MG/1
30 CAPSULE, EXTENDED RELEASE ORAL DAILY
Qty: 30 CAPSULE | Refills: 0 | Status: SHIPPED | OUTPATIENT
Start: 2018-10-25 | End: 2018-11-26 | Stop reason: SDUPTHER

## 2018-11-08 DIAGNOSIS — F32.A DEPRESSION, UNSPECIFIED DEPRESSION TYPE: ICD-10-CM

## 2018-11-12 DIAGNOSIS — F32.A DEPRESSION, UNSPECIFIED DEPRESSION TYPE: ICD-10-CM

## 2018-11-12 DIAGNOSIS — G47.00 INSOMNIA, UNSPECIFIED TYPE: ICD-10-CM

## 2018-11-12 RX ORDER — ESCITALOPRAM OXALATE 20 MG/1
20 TABLET ORAL DAILY
Qty: 90 TABLET | Refills: 0 | Status: CANCELLED | OUTPATIENT
Start: 2018-11-12

## 2018-11-13 DIAGNOSIS — F32.A DEPRESSION, UNSPECIFIED DEPRESSION TYPE: ICD-10-CM

## 2018-11-13 RX ORDER — ESCITALOPRAM OXALATE 20 MG/1
20 TABLET ORAL DAILY
Qty: 90 TABLET | Refills: 0 | Status: CANCELLED | OUTPATIENT
Start: 2018-11-13

## 2018-11-14 DIAGNOSIS — F32.A DEPRESSION, UNSPECIFIED DEPRESSION TYPE: ICD-10-CM

## 2018-11-14 DIAGNOSIS — G47.00 INSOMNIA, UNSPECIFIED TYPE: ICD-10-CM

## 2018-11-14 RX ORDER — ESCITALOPRAM OXALATE 20 MG/1
20 TABLET ORAL DAILY
Qty: 90 TABLET | Refills: 0 | Status: CANCELLED | OUTPATIENT
Start: 2018-11-14

## 2018-11-15 RX ORDER — ESCITALOPRAM OXALATE 20 MG/1
TABLET ORAL
Qty: 90 TABLET | Refills: 1 | Status: SHIPPED | OUTPATIENT
Start: 2018-11-15 | End: 2018-11-15 | Stop reason: SDUPTHER

## 2018-11-15 RX ORDER — ZOLPIDEM TARTRATE 5 MG/1
5 TABLET ORAL
Qty: 30 TABLET | Refills: 3 | Status: SHIPPED | OUTPATIENT
Start: 2018-11-15 | End: 2021-01-08 | Stop reason: DRUGHIGH

## 2018-11-15 RX ORDER — ZOLPIDEM TARTRATE 5 MG/1
5 TABLET ORAL
Qty: 30 TABLET | Refills: 0 | OUTPATIENT
Start: 2018-11-15

## 2018-11-15 RX ORDER — ESCITALOPRAM OXALATE 20 MG/1
20 TABLET ORAL DAILY
Qty: 90 TABLET | Refills: 3 | Status: SHIPPED | OUTPATIENT
Start: 2018-11-15 | End: 2019-10-31 | Stop reason: SDUPTHER

## 2018-11-26 DIAGNOSIS — F98.8 ATTENTION DEFICIT DISORDER, UNSPECIFIED HYPERACTIVITY PRESENCE: ICD-10-CM

## 2018-11-26 RX ORDER — DEXTROAMPHETAMINE SACCHARATE, AMPHETAMINE ASPARTATE MONOHYDRATE, DEXTROAMPHETAMINE SULFATE AND AMPHETAMINE SULFATE 7.5; 7.5; 7.5; 7.5 MG/1; MG/1; MG/1; MG/1
30 CAPSULE, EXTENDED RELEASE ORAL DAILY
Qty: 30 CAPSULE | Refills: 0 | Status: SHIPPED | OUTPATIENT
Start: 2018-11-26 | End: 2018-12-21 | Stop reason: SDUPTHER

## 2018-11-26 RX ORDER — DEXTROAMPHETAMINE SACCHARATE, AMPHETAMINE ASPARTATE MONOHYDRATE, DEXTROAMPHETAMINE SULFATE AND AMPHETAMINE SULFATE 7.5; 7.5; 7.5; 7.5 MG/1; MG/1; MG/1; MG/1
30 CAPSULE, EXTENDED RELEASE ORAL DAILY
Qty: 30 CAPSULE | Refills: 0 | Status: CANCELLED | OUTPATIENT
Start: 2018-11-26

## 2018-12-21 DIAGNOSIS — F98.8 ATTENTION DEFICIT DISORDER, UNSPECIFIED HYPERACTIVITY PRESENCE: ICD-10-CM

## 2018-12-21 RX ORDER — DEXTROAMPHETAMINE SACCHARATE, AMPHETAMINE ASPARTATE MONOHYDRATE, DEXTROAMPHETAMINE SULFATE AND AMPHETAMINE SULFATE 7.5; 7.5; 7.5; 7.5 MG/1; MG/1; MG/1; MG/1
30 CAPSULE, EXTENDED RELEASE ORAL DAILY
Qty: 30 CAPSULE | Refills: 0 | Status: SHIPPED | OUTPATIENT
Start: 2018-12-21 | End: 2019-01-23 | Stop reason: SDUPTHER

## 2019-01-23 DIAGNOSIS — F98.8 ATTENTION DEFICIT DISORDER, UNSPECIFIED HYPERACTIVITY PRESENCE: ICD-10-CM

## 2019-01-23 RX ORDER — DEXTROAMPHETAMINE SACCHARATE, AMPHETAMINE ASPARTATE MONOHYDRATE, DEXTROAMPHETAMINE SULFATE AND AMPHETAMINE SULFATE 7.5; 7.5; 7.5; 7.5 MG/1; MG/1; MG/1; MG/1
30 CAPSULE, EXTENDED RELEASE ORAL DAILY
Qty: 30 CAPSULE | Refills: 0 | Status: SHIPPED | OUTPATIENT
Start: 2019-01-23 | End: 2019-02-22 | Stop reason: SDUPTHER

## 2019-02-22 DIAGNOSIS — F98.8 ATTENTION DEFICIT DISORDER, UNSPECIFIED HYPERACTIVITY PRESENCE: ICD-10-CM

## 2019-02-22 RX ORDER — DEXTROAMPHETAMINE SACCHARATE, AMPHETAMINE ASPARTATE MONOHYDRATE, DEXTROAMPHETAMINE SULFATE AND AMPHETAMINE SULFATE 7.5; 7.5; 7.5; 7.5 MG/1; MG/1; MG/1; MG/1
30 CAPSULE, EXTENDED RELEASE ORAL DAILY
Qty: 30 CAPSULE | Refills: 0 | Status: SHIPPED | OUTPATIENT
Start: 2019-02-22 | End: 2019-03-25 | Stop reason: SDUPTHER

## 2019-03-25 DIAGNOSIS — F98.8 ATTENTION DEFICIT DISORDER, UNSPECIFIED HYPERACTIVITY PRESENCE: ICD-10-CM

## 2019-03-26 RX ORDER — DEXTROAMPHETAMINE SACCHARATE, AMPHETAMINE ASPARTATE MONOHYDRATE, DEXTROAMPHETAMINE SULFATE AND AMPHETAMINE SULFATE 7.5; 7.5; 7.5; 7.5 MG/1; MG/1; MG/1; MG/1
30 CAPSULE, EXTENDED RELEASE ORAL DAILY
Qty: 30 CAPSULE | Refills: 0 | Status: SHIPPED | OUTPATIENT
Start: 2019-03-26 | End: 2019-04-24 | Stop reason: SDUPTHER

## 2019-04-24 DIAGNOSIS — F98.8 ATTENTION DEFICIT DISORDER, UNSPECIFIED HYPERACTIVITY PRESENCE: ICD-10-CM

## 2019-04-25 RX ORDER — DEXTROAMPHETAMINE SACCHARATE, AMPHETAMINE ASPARTATE MONOHYDRATE, DEXTROAMPHETAMINE SULFATE AND AMPHETAMINE SULFATE 7.5; 7.5; 7.5; 7.5 MG/1; MG/1; MG/1; MG/1
30 CAPSULE, EXTENDED RELEASE ORAL DAILY
Qty: 30 CAPSULE | Refills: 0 | Status: SHIPPED | OUTPATIENT
Start: 2019-04-25 | End: 2019-05-22 | Stop reason: SDUPTHER

## 2019-05-22 DIAGNOSIS — F98.8 ATTENTION DEFICIT DISORDER, UNSPECIFIED HYPERACTIVITY PRESENCE: ICD-10-CM

## 2019-05-22 RX ORDER — DEXTROAMPHETAMINE SACCHARATE, AMPHETAMINE ASPARTATE MONOHYDRATE, DEXTROAMPHETAMINE SULFATE AND AMPHETAMINE SULFATE 7.5; 7.5; 7.5; 7.5 MG/1; MG/1; MG/1; MG/1
30 CAPSULE, EXTENDED RELEASE ORAL DAILY
Qty: 30 CAPSULE | Refills: 0 | Status: SHIPPED | OUTPATIENT
Start: 2019-05-22 | End: 2019-06-24 | Stop reason: SDUPTHER

## 2019-06-24 DIAGNOSIS — F98.8 ATTENTION DEFICIT DISORDER, UNSPECIFIED HYPERACTIVITY PRESENCE: ICD-10-CM

## 2019-06-24 RX ORDER — DEXTROAMPHETAMINE SACCHARATE, AMPHETAMINE ASPARTATE MONOHYDRATE, DEXTROAMPHETAMINE SULFATE AND AMPHETAMINE SULFATE 7.5; 7.5; 7.5; 7.5 MG/1; MG/1; MG/1; MG/1
30 CAPSULE, EXTENDED RELEASE ORAL DAILY
Qty: 30 CAPSULE | Refills: 0 | Status: SHIPPED | OUTPATIENT
Start: 2019-06-24 | End: 2019-07-24 | Stop reason: SDUPTHER

## 2019-07-24 DIAGNOSIS — F98.8 ATTENTION DEFICIT DISORDER, UNSPECIFIED HYPERACTIVITY PRESENCE: ICD-10-CM

## 2019-07-24 RX ORDER — DEXTROAMPHETAMINE SACCHARATE, AMPHETAMINE ASPARTATE MONOHYDRATE, DEXTROAMPHETAMINE SULFATE AND AMPHETAMINE SULFATE 7.5; 7.5; 7.5; 7.5 MG/1; MG/1; MG/1; MG/1
30 CAPSULE, EXTENDED RELEASE ORAL DAILY
Qty: 30 CAPSULE | Refills: 0 | Status: SHIPPED | OUTPATIENT
Start: 2019-07-24 | End: 2019-08-21 | Stop reason: SDUPTHER

## 2019-08-21 DIAGNOSIS — F98.8 ATTENTION DEFICIT DISORDER, UNSPECIFIED HYPERACTIVITY PRESENCE: ICD-10-CM

## 2019-08-22 DIAGNOSIS — I10 ESSENTIAL HYPERTENSION: Primary | ICD-10-CM

## 2019-08-22 DIAGNOSIS — Z13.220 SCREENING FOR HYPERLIPIDEMIA: ICD-10-CM

## 2019-08-22 DIAGNOSIS — Z13.1 SCREENING FOR DIABETES MELLITUS: ICD-10-CM

## 2019-08-22 RX ORDER — DEXTROAMPHETAMINE SACCHARATE, AMPHETAMINE ASPARTATE MONOHYDRATE, DEXTROAMPHETAMINE SULFATE AND AMPHETAMINE SULFATE 7.5; 7.5; 7.5; 7.5 MG/1; MG/1; MG/1; MG/1
30 CAPSULE, EXTENDED RELEASE ORAL DAILY
Qty: 30 CAPSULE | Refills: 0 | Status: SHIPPED | OUTPATIENT
Start: 2019-08-22 | End: 2019-09-23 | Stop reason: SDUPTHER

## 2019-09-23 DIAGNOSIS — F98.8 ATTENTION DEFICIT DISORDER, UNSPECIFIED HYPERACTIVITY PRESENCE: ICD-10-CM

## 2019-09-23 RX ORDER — DEXTROAMPHETAMINE SACCHARATE, AMPHETAMINE ASPARTATE MONOHYDRATE, DEXTROAMPHETAMINE SULFATE AND AMPHETAMINE SULFATE 7.5; 7.5; 7.5; 7.5 MG/1; MG/1; MG/1; MG/1
30 CAPSULE, EXTENDED RELEASE ORAL DAILY
Qty: 30 CAPSULE | Refills: 0 | Status: SHIPPED | OUTPATIENT
Start: 2019-09-23 | End: 2019-10-22 | Stop reason: SDUPTHER

## 2019-10-22 DIAGNOSIS — F98.8 ATTENTION DEFICIT DISORDER, UNSPECIFIED HYPERACTIVITY PRESENCE: ICD-10-CM

## 2019-10-22 RX ORDER — DEXTROAMPHETAMINE SACCHARATE, AMPHETAMINE ASPARTATE MONOHYDRATE, DEXTROAMPHETAMINE SULFATE AND AMPHETAMINE SULFATE 7.5; 7.5; 7.5; 7.5 MG/1; MG/1; MG/1; MG/1
30 CAPSULE, EXTENDED RELEASE ORAL DAILY
Qty: 30 CAPSULE | Refills: 0 | Status: SHIPPED | OUTPATIENT
Start: 2019-10-22 | End: 2019-11-20 | Stop reason: SDUPTHER

## 2019-10-31 ENCOUNTER — OFFICE VISIT (OUTPATIENT)
Dept: FAMILY MEDICINE CLINIC | Facility: OTHER | Age: 49
End: 2019-10-31
Payer: COMMERCIAL

## 2019-10-31 VITALS
OXYGEN SATURATION: 98 % | WEIGHT: 113.25 LBS | SYSTOLIC BLOOD PRESSURE: 100 MMHG | TEMPERATURE: 98.9 F | DIASTOLIC BLOOD PRESSURE: 60 MMHG | HEIGHT: 62 IN | HEART RATE: 94 BPM | BODY MASS INDEX: 20.84 KG/M2

## 2019-10-31 DIAGNOSIS — F41.1 GENERALIZED ANXIETY DISORDER: ICD-10-CM

## 2019-10-31 DIAGNOSIS — Z28.21 REFUSED INFLUENZA VACCINE: ICD-10-CM

## 2019-10-31 DIAGNOSIS — F90.0 ATTENTION DEFICIT HYPERACTIVITY DISORDER (ADHD), PREDOMINANTLY INATTENTIVE TYPE: Primary | ICD-10-CM

## 2019-10-31 DIAGNOSIS — R92.8 ABNORMAL MAMMOGRAM: ICD-10-CM

## 2019-10-31 DIAGNOSIS — F32.A DEPRESSION, UNSPECIFIED DEPRESSION TYPE: ICD-10-CM

## 2019-10-31 DIAGNOSIS — Z23 ENCOUNTER FOR IMMUNIZATION: ICD-10-CM

## 2019-10-31 DIAGNOSIS — D64.9 BORDERLINE ANEMIA: ICD-10-CM

## 2019-10-31 DIAGNOSIS — F43.10 POST TRAUMATIC STRESS DISORDER: ICD-10-CM

## 2019-10-31 DIAGNOSIS — F33.42 RECURRENT MAJOR DEPRESSIVE DISORDER, IN FULL REMISSION (HCC): ICD-10-CM

## 2019-10-31 DIAGNOSIS — G47.00 INSOMNIA, UNSPECIFIED TYPE: ICD-10-CM

## 2019-10-31 PROCEDURE — 90715 TDAP VACCINE 7 YRS/> IM: CPT | Performed by: NURSE PRACTITIONER

## 2019-10-31 PROCEDURE — 3008F BODY MASS INDEX DOCD: CPT | Performed by: NURSE PRACTITIONER

## 2019-10-31 PROCEDURE — 90471 IMMUNIZATION ADMIN: CPT | Performed by: NURSE PRACTITIONER

## 2019-10-31 PROCEDURE — 99214 OFFICE O/P EST MOD 30 MIN: CPT | Performed by: NURSE PRACTITIONER

## 2019-10-31 RX ORDER — ESCITALOPRAM OXALATE 20 MG/1
20 TABLET ORAL DAILY
Qty: 90 TABLET | Refills: 3 | Status: SHIPPED | OUTPATIENT
Start: 2019-10-31 | End: 2020-11-03 | Stop reason: SDUPTHER

## 2019-10-31 NOTE — PROGRESS NOTES
Assessment/Plan:         Problem List Items Addressed This Visit     Attention deficit disorder (ADD)   Generalized anxiety disorder, PTSD  Depression  Insomnia  --Well controlled on Adderall and Lexapro  Denies need for additional interventions at this time including counsellor  --Previously took Ambien for sleep, but she is doing fine without it presently  Reinforced other helpful measures  --Continue regular exercise    Relevant Medications    escitalopram (LEXAPRO) 20 mg tablet    Borderline anemia  --Repeat labs ordered  --To schedule yearly GYN visit  --Denies melena, hematochezia  No FH colon ca  Relevant Orders    CBC and differential    Comprehensive metabolic panel    Ferritin    Iron Panel (Includes Ferritin, Iron Sat%, Iron, and TIBC)    TSH, 3rd generation with Free T4 reflex    Urinalysis with reflex to microscopic    Abnormal mammogram  --Slip given for repeat mammogram     Relevant Orders   Mammo screening bilateral w 3d & cad             Recent normal lipids    Tdap given  Declines flu shot  RTO 1 year      Subjective:      Patient ID: Warrick Galeazzi is a 52 y o  female  Here for routine follow-up  No complaints or issues  She feels like Adderall remains quite helpful for focus and concentration  No AE's including decreased appetite, insomnia  Anxiety well controlled on Lexapro with no recent panic attacks  She feels a big component of her anxiety in the past was related to problems with attention deficit  Adderall has helped significantly with this  Denies feeling down/depressed lately  Not seeing counsellor  Denies need to do so  Hasn't taken Ambien since May  Getting to sleep OK using non-medication means (relaxation videos, etc)  Good energy level  Walks regularly  Decent appetite  Regular fruits and vegetables  No regular alcohol use  Had recent (9/28/19) wellness labs through work  , LDL 63, HDL 58, TG 55, fasting glucose 85  Mammogram + GYN exam 2018  Normal periods  Wears glasses  Due for eye exam    Tdap > 10 years  Declines flu shot  The following portions of the patient's history were reviewed and updated as appropriate: current medications, past family history, past medical history, past social history, past surgical history and problem list     Review of Systems   Constitutional: Negative for fatigue, fever and unexpected weight change  Respiratory: Negative for shortness of breath  Cardiovascular: Negative for chest pain and palpitations  Gastrointestinal: Negative for abdominal pain, blood in stool, diarrhea, nausea and vomiting  Genitourinary: Negative for difficulty urinating  Neurological: Negative for dizziness and headaches  Psychiatric/Behavioral:        Per HPI         Objective:      /60 (BP Location: Left arm, Patient Position: Sitting, Cuff Size: Adult)   Pulse 94   Temp 98 9 °F (37 2 °C) (Tympanic)   Ht 5' 1 5" (1 562 m)   Wt 51 4 kg (113 lb 4 oz)   SpO2 98%   BMI 21 05 kg/m²          Physical Exam   Constitutional: She is oriented to person, place, and time  She appears well-developed and well-nourished  HENT:   Head: Normocephalic  Mouth/Throat: Oropharynx is clear and moist    Eyes: Pupils are equal, round, and reactive to light  Conjunctivae are normal    Neck: Normal range of motion  Neck supple  Cardiovascular: Normal rate, regular rhythm and normal heart sounds  Pulmonary/Chest: Effort normal and breath sounds normal    Abdominal: Soft  Bowel sounds are normal  There is no tenderness  Musculoskeletal: She exhibits no edema  Lymphadenopathy:     She has no cervical adenopathy  Neurological: She is alert and oriented to person, place, and time  She has normal reflexes  Skin: Skin is warm and dry  Psychiatric: She has a normal mood and affect   Her behavior is normal  Judgment and thought content normal

## 2019-11-20 DIAGNOSIS — F98.8 ATTENTION DEFICIT DISORDER, UNSPECIFIED HYPERACTIVITY PRESENCE: ICD-10-CM

## 2019-11-20 RX ORDER — DEXTROAMPHETAMINE SACCHARATE, AMPHETAMINE ASPARTATE MONOHYDRATE, DEXTROAMPHETAMINE SULFATE AND AMPHETAMINE SULFATE 7.5; 7.5; 7.5; 7.5 MG/1; MG/1; MG/1; MG/1
30 CAPSULE, EXTENDED RELEASE ORAL DAILY
Qty: 30 CAPSULE | Refills: 0 | Status: SHIPPED | OUTPATIENT
Start: 2019-11-20 | End: 2019-12-18 | Stop reason: SDUPTHER

## 2019-12-18 DIAGNOSIS — F98.8 ATTENTION DEFICIT DISORDER, UNSPECIFIED HYPERACTIVITY PRESENCE: ICD-10-CM

## 2019-12-18 RX ORDER — DEXTROAMPHETAMINE SACCHARATE, AMPHETAMINE ASPARTATE MONOHYDRATE, DEXTROAMPHETAMINE SULFATE AND AMPHETAMINE SULFATE 7.5; 7.5; 7.5; 7.5 MG/1; MG/1; MG/1; MG/1
30 CAPSULE, EXTENDED RELEASE ORAL DAILY
Qty: 30 CAPSULE | Refills: 0 | Status: SHIPPED | OUTPATIENT
Start: 2019-12-18 | End: 2020-01-20 | Stop reason: SDUPTHER

## 2020-01-19 DIAGNOSIS — F98.8 ATTENTION DEFICIT DISORDER, UNSPECIFIED HYPERACTIVITY PRESENCE: ICD-10-CM

## 2020-01-20 DIAGNOSIS — F98.8 ATTENTION DEFICIT DISORDER, UNSPECIFIED HYPERACTIVITY PRESENCE: ICD-10-CM

## 2020-01-21 RX ORDER — DEXTROAMPHETAMINE SACCHARATE, AMPHETAMINE ASPARTATE MONOHYDRATE, DEXTROAMPHETAMINE SULFATE AND AMPHETAMINE SULFATE 7.5; 7.5; 7.5; 7.5 MG/1; MG/1; MG/1; MG/1
30 CAPSULE, EXTENDED RELEASE ORAL DAILY
Qty: 30 CAPSULE | Refills: 0 | Status: SHIPPED | OUTPATIENT
Start: 2020-01-21 | End: 2020-01-21 | Stop reason: SDUPTHER

## 2020-01-21 RX ORDER — DEXTROAMPHETAMINE SACCHARATE, AMPHETAMINE ASPARTATE MONOHYDRATE, DEXTROAMPHETAMINE SULFATE AND AMPHETAMINE SULFATE 7.5; 7.5; 7.5; 7.5 MG/1; MG/1; MG/1; MG/1
30 CAPSULE, EXTENDED RELEASE ORAL DAILY
Qty: 30 CAPSULE | Refills: 0 | Status: SHIPPED | OUTPATIENT
Start: 2020-01-21 | End: 2020-01-22 | Stop reason: SDUPTHER

## 2020-01-22 DIAGNOSIS — F98.8 ATTENTION DEFICIT DISORDER, UNSPECIFIED HYPERACTIVITY PRESENCE: ICD-10-CM

## 2020-01-22 RX ORDER — DEXTROAMPHETAMINE SACCHARATE, AMPHETAMINE ASPARTATE MONOHYDRATE, DEXTROAMPHETAMINE SULFATE AND AMPHETAMINE SULFATE 7.5; 7.5; 7.5; 7.5 MG/1; MG/1; MG/1; MG/1
30 CAPSULE, EXTENDED RELEASE ORAL DAILY
Qty: 30 CAPSULE | Refills: 0 | Status: SHIPPED | OUTPATIENT
Start: 2020-01-22 | End: 2020-02-18 | Stop reason: SDUPTHER

## 2020-02-18 DIAGNOSIS — F98.8 ATTENTION DEFICIT DISORDER, UNSPECIFIED HYPERACTIVITY PRESENCE: ICD-10-CM

## 2020-02-18 RX ORDER — DEXTROAMPHETAMINE SACCHARATE, AMPHETAMINE ASPARTATE MONOHYDRATE, DEXTROAMPHETAMINE SULFATE AND AMPHETAMINE SULFATE 7.5; 7.5; 7.5; 7.5 MG/1; MG/1; MG/1; MG/1
30 CAPSULE, EXTENDED RELEASE ORAL DAILY
Qty: 30 CAPSULE | Refills: 0 | Status: SHIPPED | OUTPATIENT
Start: 2020-02-18 | End: 2020-03-19 | Stop reason: SDUPTHER

## 2020-03-19 DIAGNOSIS — F98.8 ATTENTION DEFICIT DISORDER, UNSPECIFIED HYPERACTIVITY PRESENCE: ICD-10-CM

## 2020-03-19 RX ORDER — DEXTROAMPHETAMINE SACCHARATE, AMPHETAMINE ASPARTATE MONOHYDRATE, DEXTROAMPHETAMINE SULFATE AND AMPHETAMINE SULFATE 7.5; 7.5; 7.5; 7.5 MG/1; MG/1; MG/1; MG/1
30 CAPSULE, EXTENDED RELEASE ORAL DAILY
Qty: 30 CAPSULE | Refills: 0 | Status: SHIPPED | OUTPATIENT
Start: 2020-03-19 | End: 2020-04-20 | Stop reason: SDUPTHER

## 2020-04-20 DIAGNOSIS — F98.8 ATTENTION DEFICIT DISORDER, UNSPECIFIED HYPERACTIVITY PRESENCE: ICD-10-CM

## 2020-04-21 RX ORDER — DEXTROAMPHETAMINE SACCHARATE, AMPHETAMINE ASPARTATE MONOHYDRATE, DEXTROAMPHETAMINE SULFATE AND AMPHETAMINE SULFATE 7.5; 7.5; 7.5; 7.5 MG/1; MG/1; MG/1; MG/1
30 CAPSULE, EXTENDED RELEASE ORAL DAILY
Qty: 30 CAPSULE | Refills: 0 | Status: SHIPPED | OUTPATIENT
Start: 2020-04-21 | End: 2020-05-19 | Stop reason: SDUPTHER

## 2020-05-19 DIAGNOSIS — F98.8 ATTENTION DEFICIT DISORDER, UNSPECIFIED HYPERACTIVITY PRESENCE: ICD-10-CM

## 2020-05-20 RX ORDER — DEXTROAMPHETAMINE SACCHARATE, AMPHETAMINE ASPARTATE MONOHYDRATE, DEXTROAMPHETAMINE SULFATE AND AMPHETAMINE SULFATE 7.5; 7.5; 7.5; 7.5 MG/1; MG/1; MG/1; MG/1
30 CAPSULE, EXTENDED RELEASE ORAL DAILY
Qty: 30 CAPSULE | Refills: 0 | Status: SHIPPED | OUTPATIENT
Start: 2020-05-20 | End: 2020-06-18 | Stop reason: SDUPTHER

## 2020-06-18 DIAGNOSIS — F98.8 ATTENTION DEFICIT DISORDER, UNSPECIFIED HYPERACTIVITY PRESENCE: ICD-10-CM

## 2020-06-18 RX ORDER — DEXTROAMPHETAMINE SACCHARATE, AMPHETAMINE ASPARTATE MONOHYDRATE, DEXTROAMPHETAMINE SULFATE AND AMPHETAMINE SULFATE 7.5; 7.5; 7.5; 7.5 MG/1; MG/1; MG/1; MG/1
30 CAPSULE, EXTENDED RELEASE ORAL DAILY
Qty: 30 CAPSULE | Refills: 0 | Status: SHIPPED | OUTPATIENT
Start: 2020-06-18 | End: 2020-07-20 | Stop reason: SDUPTHER

## 2020-07-20 DIAGNOSIS — F98.8 ATTENTION DEFICIT DISORDER, UNSPECIFIED HYPERACTIVITY PRESENCE: ICD-10-CM

## 2020-07-21 RX ORDER — DEXTROAMPHETAMINE SACCHARATE, AMPHETAMINE ASPARTATE MONOHYDRATE, DEXTROAMPHETAMINE SULFATE AND AMPHETAMINE SULFATE 7.5; 7.5; 7.5; 7.5 MG/1; MG/1; MG/1; MG/1
30 CAPSULE, EXTENDED RELEASE ORAL DAILY
Qty: 30 CAPSULE | Refills: 0 | Status: SHIPPED | OUTPATIENT
Start: 2020-07-21 | End: 2020-08-18 | Stop reason: SDUPTHER

## 2020-07-21 NOTE — TELEPHONE ENCOUNTER
PDMP consulted and appropriate -- no red flags  Please inform pt that Carter Blanton is leaving the practice next week  She must make an appt to establish care with one of the physicians before October in order to continue to receive refills  Thanks!

## 2020-08-18 DIAGNOSIS — F98.8 ATTENTION DEFICIT DISORDER, UNSPECIFIED HYPERACTIVITY PRESENCE: ICD-10-CM

## 2020-08-19 RX ORDER — DEXTROAMPHETAMINE SACCHARATE, AMPHETAMINE ASPARTATE MONOHYDRATE, DEXTROAMPHETAMINE SULFATE AND AMPHETAMINE SULFATE 7.5; 7.5; 7.5; 7.5 MG/1; MG/1; MG/1; MG/1
30 CAPSULE, EXTENDED RELEASE ORAL DAILY
Qty: 30 CAPSULE | Refills: 0 | Status: SHIPPED | OUTPATIENT
Start: 2020-08-19 | End: 2020-09-17 | Stop reason: SDUPTHER

## 2020-08-19 NOTE — TELEPHONE ENCOUNTER
PDMP consulted and appropriate -- no red flags  Please inform pt that Maria Elena Marr is no longer with the practice  She should make an appt to reestablish with one of the physicians by end of October in order to avoid refill disruptions

## 2020-08-19 NOTE — TELEPHONE ENCOUNTER
Patient notified rx sent into pharmacy   And she also has physical appointment with Dr Olga Ibrahim   On 11/3/2020

## 2020-09-17 DIAGNOSIS — F98.8 ATTENTION DEFICIT DISORDER, UNSPECIFIED HYPERACTIVITY PRESENCE: ICD-10-CM

## 2020-09-17 RX ORDER — DEXTROAMPHETAMINE SACCHARATE, AMPHETAMINE ASPARTATE MONOHYDRATE, DEXTROAMPHETAMINE SULFATE AND AMPHETAMINE SULFATE 7.5; 7.5; 7.5; 7.5 MG/1; MG/1; MG/1; MG/1
30 CAPSULE, EXTENDED RELEASE ORAL DAILY
Qty: 30 CAPSULE | Refills: 0 | Status: SHIPPED | OUTPATIENT
Start: 2020-09-17 | End: 2020-10-16 | Stop reason: SDUPTHER

## 2020-10-16 DIAGNOSIS — F98.8 ATTENTION DEFICIT DISORDER, UNSPECIFIED HYPERACTIVITY PRESENCE: ICD-10-CM

## 2020-10-16 RX ORDER — DEXTROAMPHETAMINE SACCHARATE, AMPHETAMINE ASPARTATE MONOHYDRATE, DEXTROAMPHETAMINE SULFATE AND AMPHETAMINE SULFATE 7.5; 7.5; 7.5; 7.5 MG/1; MG/1; MG/1; MG/1
30 CAPSULE, EXTENDED RELEASE ORAL DAILY
Qty: 30 CAPSULE | Refills: 0 | Status: SHIPPED | OUTPATIENT
Start: 2020-10-16 | End: 2020-11-15 | Stop reason: SDUPTHER

## 2020-11-03 ENCOUNTER — OFFICE VISIT (OUTPATIENT)
Dept: FAMILY MEDICINE CLINIC | Facility: OTHER | Age: 50
End: 2020-11-03
Payer: COMMERCIAL

## 2020-11-03 VITALS
BODY MASS INDEX: 25.72 KG/M2 | TEMPERATURE: 97.6 F | HEART RATE: 98 BPM | SYSTOLIC BLOOD PRESSURE: 160 MMHG | HEIGHT: 60 IN | WEIGHT: 131 LBS | DIASTOLIC BLOOD PRESSURE: 100 MMHG

## 2020-11-03 DIAGNOSIS — Z12.31 ENCOUNTER FOR SCREENING MAMMOGRAM FOR MALIGNANT NEOPLASM OF BREAST: ICD-10-CM

## 2020-11-03 DIAGNOSIS — R03.0 ELEVATED BLOOD PRESSURE READING: ICD-10-CM

## 2020-11-03 DIAGNOSIS — Z12.11 SCREENING FOR COLORECTAL CANCER: ICD-10-CM

## 2020-11-03 DIAGNOSIS — Z12.12 SCREENING FOR COLORECTAL CANCER: ICD-10-CM

## 2020-11-03 DIAGNOSIS — Z13.1 SCREENING FOR DIABETES MELLITUS: ICD-10-CM

## 2020-11-03 DIAGNOSIS — F32.A DEPRESSION, UNSPECIFIED DEPRESSION TYPE: ICD-10-CM

## 2020-11-03 DIAGNOSIS — Z13.6 SCREENING FOR CARDIOVASCULAR CONDITION: ICD-10-CM

## 2020-11-03 DIAGNOSIS — Z00.00 ANNUAL PHYSICAL EXAM: Primary | ICD-10-CM

## 2020-11-03 DIAGNOSIS — Z11.4 SCREENING FOR HIV (HUMAN IMMUNODEFICIENCY VIRUS): ICD-10-CM

## 2020-11-03 DIAGNOSIS — Z23 INFLUENZA VACCINE NEEDED: ICD-10-CM

## 2020-11-03 PROCEDURE — 99396 PREV VISIT EST AGE 40-64: CPT | Performed by: FAMILY MEDICINE

## 2020-11-03 PROCEDURE — 90682 RIV4 VACC RECOMBINANT DNA IM: CPT | Performed by: FAMILY MEDICINE

## 2020-11-03 PROCEDURE — 3725F SCREEN DEPRESSION PERFORMED: CPT | Performed by: FAMILY MEDICINE

## 2020-11-03 PROCEDURE — 90471 IMMUNIZATION ADMIN: CPT | Performed by: FAMILY MEDICINE

## 2020-11-03 RX ORDER — ESCITALOPRAM OXALATE 20 MG/1
20 TABLET ORAL DAILY
Qty: 90 TABLET | Refills: 1 | Status: SHIPPED | OUTPATIENT
Start: 2020-11-03 | End: 2021-05-07

## 2020-11-15 DIAGNOSIS — F98.8 ATTENTION DEFICIT DISORDER, UNSPECIFIED HYPERACTIVITY PRESENCE: ICD-10-CM

## 2020-11-17 DIAGNOSIS — F98.8 ATTENTION DEFICIT DISORDER, UNSPECIFIED HYPERACTIVITY PRESENCE: ICD-10-CM

## 2020-11-17 RX ORDER — DEXTROAMPHETAMINE SACCHARATE, AMPHETAMINE ASPARTATE MONOHYDRATE, DEXTROAMPHETAMINE SULFATE AND AMPHETAMINE SULFATE 7.5; 7.5; 7.5; 7.5 MG/1; MG/1; MG/1; MG/1
30 CAPSULE, EXTENDED RELEASE ORAL DAILY
Qty: 30 CAPSULE | Refills: 0 | Status: SHIPPED | OUTPATIENT
Start: 2020-11-17 | End: 2020-12-15 | Stop reason: SDUPTHER

## 2020-11-17 RX ORDER — DEXTROAMPHETAMINE SACCHARATE, AMPHETAMINE ASPARTATE MONOHYDRATE, DEXTROAMPHETAMINE SULFATE AND AMPHETAMINE SULFATE 7.5; 7.5; 7.5; 7.5 MG/1; MG/1; MG/1; MG/1
30 CAPSULE, EXTENDED RELEASE ORAL DAILY
Qty: 30 CAPSULE | Refills: 0 | Status: CANCELLED | OUTPATIENT
Start: 2020-11-17

## 2020-11-23 ENCOUNTER — APPOINTMENT (OUTPATIENT)
Dept: LAB | Facility: AMBULARY SURGERY CENTER | Age: 50
End: 2020-11-23
Payer: COMMERCIAL

## 2020-11-23 DIAGNOSIS — Z13.1 SCREENING FOR DIABETES MELLITUS: ICD-10-CM

## 2020-11-23 DIAGNOSIS — Z12.11 SCREENING FOR COLORECTAL CANCER: ICD-10-CM

## 2020-11-23 DIAGNOSIS — Z12.12 SCREENING FOR COLORECTAL CANCER: ICD-10-CM

## 2020-11-23 DIAGNOSIS — Z11.4 SCREENING FOR HIV (HUMAN IMMUNODEFICIENCY VIRUS): ICD-10-CM

## 2020-11-23 DIAGNOSIS — Z13.6 SCREENING FOR CARDIOVASCULAR CONDITION: ICD-10-CM

## 2020-11-23 LAB
ALBUMIN SERPL BCP-MCNC: 3.7 G/DL (ref 3.5–5)
ALP SERPL-CCNC: 55 U/L (ref 46–116)
ALT SERPL W P-5'-P-CCNC: 16 U/L (ref 12–78)
ANION GAP SERPL CALCULATED.3IONS-SCNC: 4 MMOL/L (ref 4–13)
AST SERPL W P-5'-P-CCNC: 14 U/L (ref 5–45)
BILIRUB SERPL-MCNC: 0.53 MG/DL (ref 0.2–1)
BUN SERPL-MCNC: 14 MG/DL (ref 5–25)
CALCIUM SERPL-MCNC: 9 MG/DL (ref 8.3–10.1)
CHLORIDE SERPL-SCNC: 109 MMOL/L (ref 100–108)
CHOLEST SERPL-MCNC: 135 MG/DL (ref 50–200)
CO2 SERPL-SCNC: 28 MMOL/L (ref 21–32)
CREAT SERPL-MCNC: 0.64 MG/DL (ref 0.6–1.3)
GFR SERPL CREATININE-BSD FRML MDRD: 104 ML/MIN/1.73SQ M
GLUCOSE P FAST SERPL-MCNC: 83 MG/DL (ref 65–99)
HDLC SERPL-MCNC: 75 MG/DL
HEMOCCULT STL QL IA: NEGATIVE
LDLC SERPL CALC-MCNC: 48 MG/DL (ref 0–100)
POTASSIUM SERPL-SCNC: 3.4 MMOL/L (ref 3.5–5.3)
PROT SERPL-MCNC: 7.5 G/DL (ref 6.4–8.2)
SODIUM SERPL-SCNC: 141 MMOL/L (ref 136–145)
TRIGL SERPL-MCNC: 62 MG/DL

## 2020-11-23 PROCEDURE — 36415 COLL VENOUS BLD VENIPUNCTURE: CPT

## 2020-11-23 PROCEDURE — 87389 HIV-1 AG W/HIV-1&-2 AB AG IA: CPT

## 2020-11-23 PROCEDURE — 80053 COMPREHEN METABOLIC PANEL: CPT

## 2020-11-23 PROCEDURE — G0328 FECAL BLOOD SCRN IMMUNOASSAY: HCPCS

## 2020-11-23 PROCEDURE — 80061 LIPID PANEL: CPT

## 2020-11-24 ENCOUNTER — OFFICE VISIT (OUTPATIENT)
Dept: FAMILY MEDICINE CLINIC | Facility: OTHER | Age: 50
End: 2020-11-24
Payer: COMMERCIAL

## 2020-11-24 VITALS
BODY MASS INDEX: 25.91 KG/M2 | HEART RATE: 94 BPM | DIASTOLIC BLOOD PRESSURE: 80 MMHG | OXYGEN SATURATION: 99 % | HEIGHT: 60 IN | SYSTOLIC BLOOD PRESSURE: 130 MMHG | WEIGHT: 132 LBS | TEMPERATURE: 98 F

## 2020-11-24 DIAGNOSIS — I10 ESSENTIAL HYPERTENSION: Primary | ICD-10-CM

## 2020-11-24 LAB — HIV 1+2 AB+HIV1 P24 AG SERPL QL IA: NORMAL

## 2020-11-24 PROCEDURE — 3008F BODY MASS INDEX DOCD: CPT | Performed by: FAMILY MEDICINE

## 2020-11-24 PROCEDURE — 99213 OFFICE O/P EST LOW 20 MIN: CPT | Performed by: FAMILY MEDICINE

## 2020-11-24 PROCEDURE — 3075F SYST BP GE 130 - 139MM HG: CPT | Performed by: FAMILY MEDICINE

## 2020-11-24 PROCEDURE — 3079F DIAST BP 80-89 MM HG: CPT | Performed by: FAMILY MEDICINE

## 2020-11-24 RX ORDER — LISINOPRIL 2.5 MG/1
2.5 TABLET ORAL DAILY
Qty: 90 TABLET | Refills: 0 | Status: SHIPPED | OUTPATIENT
Start: 2020-11-24 | End: 2021-01-08

## 2020-12-15 DIAGNOSIS — F98.8 ATTENTION DEFICIT DISORDER, UNSPECIFIED HYPERACTIVITY PRESENCE: ICD-10-CM

## 2020-12-15 RX ORDER — DEXTROAMPHETAMINE SACCHARATE, AMPHETAMINE ASPARTATE MONOHYDRATE, DEXTROAMPHETAMINE SULFATE AND AMPHETAMINE SULFATE 7.5; 7.5; 7.5; 7.5 MG/1; MG/1; MG/1; MG/1
30 CAPSULE, EXTENDED RELEASE ORAL DAILY
Qty: 30 CAPSULE | Refills: 0 | Status: SHIPPED | OUTPATIENT
Start: 2020-12-15 | End: 2021-01-15 | Stop reason: SDUPTHER

## 2021-01-08 ENCOUNTER — OFFICE VISIT (OUTPATIENT)
Dept: FAMILY MEDICINE CLINIC | Facility: OTHER | Age: 51
End: 2021-01-08
Payer: COMMERCIAL

## 2021-01-08 VITALS
OXYGEN SATURATION: 99 % | TEMPERATURE: 98.2 F | SYSTOLIC BLOOD PRESSURE: 134 MMHG | HEART RATE: 89 BPM | RESPIRATION RATE: 18 BRPM | DIASTOLIC BLOOD PRESSURE: 80 MMHG | BODY MASS INDEX: 26.31 KG/M2 | WEIGHT: 134 LBS | HEIGHT: 60 IN

## 2021-01-08 DIAGNOSIS — I10 ESSENTIAL HYPERTENSION: Primary | ICD-10-CM

## 2021-01-08 PROCEDURE — 3008F BODY MASS INDEX DOCD: CPT | Performed by: FAMILY MEDICINE

## 2021-01-08 PROCEDURE — 3079F DIAST BP 80-89 MM HG: CPT | Performed by: FAMILY MEDICINE

## 2021-01-08 PROCEDURE — 1036F TOBACCO NON-USER: CPT | Performed by: FAMILY MEDICINE

## 2021-01-08 PROCEDURE — 3075F SYST BP GE 130 - 139MM HG: CPT | Performed by: FAMILY MEDICINE

## 2021-01-08 PROCEDURE — 3725F SCREEN DEPRESSION PERFORMED: CPT | Performed by: FAMILY MEDICINE

## 2021-01-08 PROCEDURE — 99213 OFFICE O/P EST LOW 20 MIN: CPT | Performed by: FAMILY MEDICINE

## 2021-01-08 RX ORDER — LISINOPRIL 5 MG/1
5 TABLET ORAL DAILY
Qty: 90 TABLET | Refills: 1 | Status: SHIPPED | OUTPATIENT
Start: 2021-01-08 | End: 2021-07-15

## 2021-01-08 NOTE — PATIENT INSTRUCTIONS
DASH Eating Plan   WHAT YOU NEED TO KNOW:   The DASH (Dietary Approaches to Stop Hypertension) Eating Plan is designed to help prevent or lower high blood pressure  It can also help to lower LDL (bad) cholesterol and decrease your risk of heart disease  The plan is low in sodium, sugar, unhealthy fats, and total fat  It is high in potassium, calcium, magnesium, and fiber  These nutrients are added when you eat more fruits, vegetables, and whole grains  DISCHARGE INSTRUCTIONS:   Your sodium limit each day: Your dietitian will tell you how much sodium is safe for you to have each day  People with high blood pressure should have no more than 1,500 to 2,300 mg of sodium in a day  A teaspoon (tsp) of salt has 2,300 mg of sodium  This may seem like a difficult goal, but small changes to the foods you eat can make a big difference  Your healthcare provider or dietitian can help you create a meal plan that follows your sodium limit  How to limit sodium:   · Read food labels  Food labels can help you choose foods that are low in sodium  The amount of sodium is listed in milligrams (mg)  The % Daily Value (DV) column tells you how much of your daily needs are met by 1 serving of the food for each nutrient listed  Choose foods that have less than 5% of the DV of sodium  These foods are considered low in sodium  Foods that have 20% or more of the DV of sodium are considered high in sodium  Avoid foods that have more than 300 mg of sodium in each serving  Choose foods that say low-sodium, reduced-sodium, or no salt added on the food label  · Avoid salt  Do not salt food at the table, and add very little salt to foods during cooking  Use herbs and spices, such as onions, garlic, and salt-free seasonings to add flavor to foods  Try lemon or lime juice or vinegar to give foods a tart flavor  Use hot peppers or a small amount of hot pepper sauce to add a spicy flavor to foods  · Ask about salt substitutes    Ask your healthcare provider if you may use salt substitutes  Some salt substitutes have ingredients that can be harmful if you have certain health conditions  · Choose foods carefully at restaurants  Meals from restaurants, especially fast food restaurants, are often high in sodium  Some restaurants have nutrition information that tells you the amount of sodium in their foods  Ask to have your food prepared with less, or no salt  What you need to know about fats:   · Include healthy fats  Examples are unsaturated fats and omega-3 fatty acids  Unsaturated fats are found in soybean, canola, olive, or sunflower oil, and liquid and soft tub margarines  Omega-3 fatty acids are found in fatty fish, such as salmon, tuna, mackerel, and sardines  It is also found in flaxseed oil and ground flaxseed  · Avoid unhealthy fats  Do not eat unhealthy fats, such as saturated fats and trans fats  Saturated fats are found in foods that contain fat from animals  Examples are fatty meats, whole milk, butter, cream, and other dairy foods  It is also found in shortening, stick margarine, palm oil, and coconut oil  Trans fats are found in fried foods, crackers, chips, and baked goods made with margarine or shortening  Foods to include: With the DASH eating plan, you need to eat a certain number of servings from each food group  This will help you get enough of certain nutrients and limit others  The amount of servings you should eat depends on how many calories you need  Your dietitian can tell you how many calories you need  The number of servings listed next to the food groups below are for people who need about 2,000 calories each day  · Grains:  6 to 8 servings (3 of these servings should be whole-grain foods)    ? 1 slice of whole-grain bread     ? 1 ounce of dry cereal    ? ½ cup of cooked cereal, pasta, or brown rice    · Vegetables and fruits:  4 to 5 servings of fruits and 4 to 5 servings of vegetables    ?  1 medium fruit    ? ½ cup of frozen, canned (no added salt), or chopped fresh vegetables     ? ½ cup of fresh, frozen, dried, or canned fruit (canned in light syrup or fruit juice)    ? ½ cup of vegetable or fruit juice    · Dairy:  2 to 3 servings    ? 1 cup of nonfat (skim) or 1% milk    ? 1½ ounces of fat-free or low-fat cheese    ? 6 ounces of nonfat or low-fat yogurt    · Lean meat, poultry, and fish:  6 ounces or less    ? Poultry (chicken, turkey) with no skin    ? Fish (especially fatty fish, such as salmon, fresh tuna, or mackerel)    ? Lean beef and pork (loin, round, extra lean hamburger)    ? Egg whites and egg substitutes    · Nuts, seeds, and legumes:  4 to 5 servings each week    ? ½ cup of cooked beans and peas    ? 1½ ounces of unsalted nuts    ? 2 tablespoons of peanut butter or seeds    · Sweets and added sugars:  5 or less each week    ? 1 tablespoon of sugar, jelly, or jam    ? ½ cup of sorbet or gelatin    ? 1 cup of lemonade    · Fats:  2 to 3 servings each week    ? 1 teaspoon of soft margarine or vegetable oil    ? 1 tablespoon of mayonnaise    ? 2 tablespoons of salad dressing    Foods to avoid:   · Grains:      ? Baked goods, such as doughnuts, pastries, cookies, and biscuits (high in fat and sugar)    ? Mixes for cornbread and biscuits, packaged foods, such as bread stuffing, rice and pasta mixes, macaroni and cheese, and instant cereals (high in sodium)    · Fruits and vegetables:      ? Regular, canned vegetables (high in sodium)    ? Sauerkraut, pickled vegetables, and other foods prepared in brine (high in sodium)    ? Fried vegetables or vegetables in butter or high-fat sauces    ? Fruit in cream or butter sauce (high in fat)    · Dairy:      ? Whole milk, 2% milk, and cream (high in fat)    ?  Regular cheese and processed cheese (high in fat and sodium)    · Meats and protein foods:      ? Smoked or cured meat, such as corned beef, bagley, ham, hot dogs, and sausage (high in fat and sodium)    ? Canned beans and canned meats or spreads, such as potted meats, sardines, anchovies, and imitation seafood (high in sodium)    ? Deli or lunch meats, such as bologna, ham, turkey, and roast beef (high in sodium)    ? High-fat meat (T-bone steak, regular hamburger, and ribs)    ? Whole eggs and egg yolks (high in fat)    · Other:      ? Seasonings made with salt, such as garlic salt, celery salt, onion salt, seasoned salt, meat tenderizers, and monosodium glutamate (MSG)    ? Miso soup and canned or dried soup mixes (high in sodium)    ? Regular soy sauce, barbecue sauce, teriyaki sauce, steak sauce, Worcestershire sauce, and most flavored vinegars (high in sodium)    ? Regular condiments, such as mustard, ketchup, and salad dressings (high in sodium)    ? Gravy and sauces, such as Abhay or cheese sauces (high in sodium and fat)    ? Drinks high in sugar, such as soda or fruit drinks    ? Snack foods, such as salted chips, popcorn, pretzels, pork rinds, salted crackers, and salted nuts    ? Frozen foods, such as dinners, entrees, vegetables with sauces, and breaded meats (high in sodium)    Other guidelines to follow:   · Maintain a healthy weight  Your risk for heart disease is higher if you are overweight  Your healthcare provider may suggest that you lose weight if you are overweight  You can lose weight by eating fewer calories and foods that have added sugars and fat  The DASH meal plan can help you do this  Decrease calories by eating smaller portions at each meal and fewer snacks  Ask your healthcare provider for more information about how to lose weight  · Exercise regularly  Regular exercise can help you reach or maintain a healthy weight  Regular exercise can also help decrease your blood pressure and improve your cholesterol levels  Get 30 minutes or more of moderate exercise each day of the week  To lose weight, get at least 60 minutes of exercise   Talk to your healthcare provider about the best exercise program for you  · Limit alcohol  Women should limit alcohol to 1 drink a day  Men should limit alcohol to 2 drinks a day  A drink of alcohol is 12 ounces of beer, 5 ounces of wine, or 1½ ounces of liquor  © Copyright [address] Information is for End User's use only and may not be sold, redistributed or otherwise used for commercial purposes  All illustrations and images included in CareNotes® are the copyrighted property of A D A M , Inc  or [facility] Sheeba Macias   The above information is an  only  It is not intended as medical advice for individual conditions or treatments  Talk to your doctor, nurse or pharmacist before following any medical regimen to see if it is safe and effective for you

## 2021-01-08 NOTE — PROGRESS NOTES
Assessment/Plan:     Diagnoses and all orders for this visit:    Essential hypertension  -     lisinopril (ZESTRIL) 5 mg tablet; Take 1 tablet (5 mg total) by mouth daily    Patient's hypertension has improved, however it is still not under adequate control  Will increase dose of lisinopril from 2 5 mg once daily to 5 mg once daily  Patient was advised to continue monitoring blood pressure at home and keep a log of these readings for review at our next visit  She was also advised to continue with lifestyle modifications including following DASH diet and getting regular physical activity  Return in about 1 month (around 2/8/2021) for Recheck HTN   The patient indicates understanding of these issues and agrees with the plan  Subjective:      Patient ID: Mynor Webster is a 48 y o  female  HPI  Patient presents for blood pressure check  Six weeks ago she was started on lisinopril 2 5 mg once daily for elevated blood pressure  She was asked monitor her blood pressure at home and reports systolic blood pressure is normally in the 649-124 and diastolic blood pressures in the 80s and 90s  Patient also reports decreasing amount of sodium in her diet  She denies any adverse side effects from the Lisinopril  Patient also denies cough, headaches, chest pain, shortness of breath, swelling in her lower extremities, nausea and vomiting  The following portions of the patient's history were reviewed and updated as appropriate: allergies, current medications, past family history, past medical history, past social history, past surgical history and problem list     Review of Systems   Constitutional: Negative for chills and fever  HENT: Negative for ear pain and sore throat  Eyes: Negative for pain and visual disturbance  Respiratory: Negative for cough and shortness of breath  Cardiovascular: Negative for chest pain and palpitations  Gastrointestinal: Negative for abdominal pain and vomiting  Genitourinary: Negative for dysuria and hematuria  Musculoskeletal: Negative for arthralgias and back pain  Skin: Negative for color change and rash  Neurological: Negative for seizures and syncope  All other systems reviewed and are negative  Objective:  /80 (BP Location: Left arm, Patient Position: Sitting, Cuff Size: Standard)   Pulse 89   Temp 98 2 °F (36 8 °C) (Temporal)   Resp 18   Ht 5' (1 524 m)   Wt 60 8 kg (134 lb)   SpO2 99%   BMI 26 17 kg/m²      Physical Exam  Vitals signs reviewed  Constitutional:       General: She is not in acute distress  Appearance: Normal appearance  She is not ill-appearing, toxic-appearing or diaphoretic  HENT:      Head: Normocephalic and atraumatic  Right Ear: External ear normal       Left Ear: External ear normal    Eyes:      General: No scleral icterus  Right eye: No discharge  Left eye: No discharge  Extraocular Movements: Extraocular movements intact  Conjunctiva/sclera: Conjunctivae normal    Neck:      Musculoskeletal: Normal range of motion and neck supple  Cardiovascular:      Rate and Rhythm: Normal rate and regular rhythm  Pulses: Normal pulses  Heart sounds: Normal heart sounds  No murmur  No friction rub  No gallop  Pulmonary:      Effort: Pulmonary effort is normal  No respiratory distress  Breath sounds: Normal breath sounds  No wheezing  Musculoskeletal:      Right lower leg: No edema  Left lower leg: No edema  Skin:     General: Skin is warm and dry  Capillary Refill: Capillary refill takes less than 2 seconds  Neurological:      General: No focal deficit present  Mental Status: She is alert and oriented to person, place, and time  Psychiatric:         Mood and Affect: Mood normal          Behavior: Behavior normal          Thought Content:  Thought content normal          Judgment: Judgment normal

## 2021-01-14 DIAGNOSIS — F98.8 ATTENTION DEFICIT DISORDER, UNSPECIFIED HYPERACTIVITY PRESENCE: ICD-10-CM

## 2021-01-14 RX ORDER — DEXTROAMPHETAMINE SACCHARATE, AMPHETAMINE ASPARTATE MONOHYDRATE, DEXTROAMPHETAMINE SULFATE AND AMPHETAMINE SULFATE 7.5; 7.5; 7.5; 7.5 MG/1; MG/1; MG/1; MG/1
30 CAPSULE, EXTENDED RELEASE ORAL DAILY
Qty: 30 CAPSULE | Refills: 0 | Status: CANCELLED | OUTPATIENT
Start: 2021-01-14

## 2021-01-15 DIAGNOSIS — F98.8 ATTENTION DEFICIT DISORDER, UNSPECIFIED HYPERACTIVITY PRESENCE: ICD-10-CM

## 2021-01-15 RX ORDER — DEXTROAMPHETAMINE SACCHARATE, AMPHETAMINE ASPARTATE MONOHYDRATE, DEXTROAMPHETAMINE SULFATE AND AMPHETAMINE SULFATE 7.5; 7.5; 7.5; 7.5 MG/1; MG/1; MG/1; MG/1
30 CAPSULE, EXTENDED RELEASE ORAL DAILY
Qty: 30 CAPSULE | Refills: 0 | Status: SHIPPED | OUTPATIENT
Start: 2021-01-15 | End: 2021-02-19 | Stop reason: SDUPTHER

## 2021-01-18 ENCOUNTER — TELEPHONE (OUTPATIENT)
Dept: FAMILY MEDICINE CLINIC | Facility: OTHER | Age: 51
End: 2021-01-18

## 2021-01-18 NOTE — TELEPHONE ENCOUNTER
Informed patient of the first prior auth being denied and the pharmacy sending another over to the office, also instructed patient to call the office around noon tomorrow if she does not hear from us with an update before then  Patient did state that she was out of her medications   patient was informed that we will let the providers know that we are working on a prior auth to try and get the prescriptions covered or have other options lined up

## 2021-01-18 NOTE — TELEPHONE ENCOUNTER
Second key provided by pharmacy allowed office to submit a prior auth, which was completed at 1430 and results pending

## 2021-01-18 NOTE — TELEPHONE ENCOUNTER
Spoke with pharmacy, they are going to send another prior auth request to attempt a different outcome

## 2021-01-18 NOTE — TELEPHONE ENCOUNTER
Patient notified that second prior auth attempt was submitted and office is just waiting on a response

## 2021-01-19 NOTE — TELEPHONE ENCOUNTER
Prior auth submitted to [facility] via fax from this office at 1400hrs

## 2021-01-19 NOTE — TELEPHONE ENCOUNTER
Prior auth canceled  due to being sent to [facility], contacting patient to confirm insurance provider

## 2021-01-21 NOTE — TELEPHONE ENCOUNTER
Patient called in stating that the insurance company had reached out to the office yesterday via fax with a questioner to be filled out and they had not heard back yet  Office received the fax this morning and will be working on completing the questions and faxing back to [facility]

## 2021-02-12 ENCOUNTER — TELEMEDICINE (OUTPATIENT)
Dept: FAMILY MEDICINE CLINIC | Facility: OTHER | Age: 51
End: 2021-02-12
Payer: COMMERCIAL

## 2021-02-12 DIAGNOSIS — I10 ESSENTIAL HYPERTENSION: Primary | ICD-10-CM

## 2021-02-12 PROCEDURE — 99213 OFFICE O/P EST LOW 20 MIN: CPT | Performed by: FAMILY MEDICINE

## 2021-02-12 NOTE — PROGRESS NOTES
Virtual Regular Visit      Assessment/Plan:    Problem List Items Addressed This Visit        Cardiovascular and Mediastinum    Essential hypertension - Primary          Patient to continue on Lisinopril 5 mg once daily  She was advised to continue on DASH diet  Patient was also encouraged to begin regular physical activity  Return in about 4 months (around 6/12/2021) for Recheck HYPERTENSION  The patient indicates understanding of these issues and agrees with the plan  Reason for visit is   Chief Complaint   Patient presents with    Virtual Regular Visit        Encounter provider Bobby Palacios MD    Provider located at [address] [address]      Recent Visits  No visits were found meeting these conditions  Showing recent visits within past 7 days and meeting all other requirements     Today's Visits  Date Type Provider Dept   02/12/21 Telemedicine Bobby Palacios MD [facility]   Showing today's visits and meeting all other requirements     Future Appointments  No visits were found meeting these conditions  Showing future appointments within next 150 days and meeting all other requirements        The patient was identified by name and date of birth  Jerome Quezada was informed that this is a telemedicine visit and that the visit is being conducted through [facility] and patient was informed that this is a secure, HIPAA-compliant platform  She agrees to proceed     My office door was closed  The patient was notified the following individuals were present in the room Dr Ty Gates  She acknowledged consent and understanding of privacy and security of the video platform  The patient has agreed to participate and understands they can discontinue the visit at any time  Patient is aware this is a billable service       Jamie Steele is a 48 y o  female with past medical history of Hypertension whose dose of Lisinopril was increased from 2 5mg qd to 5mg qd at our visit last month  Patient states that she has been monitoring blood pressure regularly at home and that SBP is normally in the 110-120s range and DBP is in the 70s  Patient states that she has been trying to adhere to the [address] as we discussed at last visit  Patient denies any unwanted side effects of the medication  Patient states that she has not yet increased her physical activity, but will do so once the weather warms up  Past Medical History:   Diagnosis Date    Abnormal mammogram     last assessed: 08/14/17    Arthritis     Attention deficit disorder (ADD)     last assessed: 11/30/17    Benign essential hypertension     last assessed: 08/04/16    Borderline anemia     last assessed: 11/30/17    Depression     last assessed: 11/30/17    Generalized anxiety disorder     High serum renin     High serum renin     last assessed: 01/07/16    Hyperaldosteronism ([address] )     last assessed: 01/07/16    Hypertension     Hypokalemia     last assessed: 01/07/16    Insomnia     Left knee pain     Major depressive disorder     Microscopic hematuria     last assessed: 08/14/17    Patellofemoral arthritis of left knee     last assessed: 05/18/15    Pes anserine bursitis     last assessed: 11/12/15    Pregnancy induced hypertension     PTSD (post-traumatic stress disorder)        Past Surgical History:   Procedure Laterality Date    DENTAL SURGERY      HERNIA REPAIR         Current Outpatient Medications   Medication Sig Dispense Refill    amphetamine-dextroamphetamine (ADDERALL XR) 30 MG 24 hr capsule Take 1 capsule (30 mg total) by mouth dailyMax Daily Amount: 30 mg 30 capsule 0    escitalopram (LEXAPRO) 20 mg tablet Take 1 tablet (20 mg total) by mouth daily 90 tablet 1    lisinopril (ZESTRIL) 5 mg tablet Take 1 tablet (5 mg total) by mouth daily 90 tablet 1     No current facility-administered medications for this visit           No Known Allergies    Review of Systems   Constitutional: Negative for chills, diaphoresis, fever and unexpected weight change  HENT: Negative for ear pain and sore throat  Eyes: Negative for pain and visual disturbance  Respiratory: Negative for cough, chest tightness and shortness of breath  Cardiovascular: Negative for chest pain, palpitations and leg swelling  Gastrointestinal: Negative for abdominal pain, constipation, diarrhea and vomiting  Genitourinary: Negative for decreased urine volume and difficulty urinating  Skin: Negative for color change and rash  Neurological: Negative for dizziness, seizures, syncope, light-headedness, numbness and headaches  Psychiatric/Behavioral: Negative for sleep disturbance  All other systems reviewed and are negative  Video Exam  BP- 110/77    Physical Exam  Constitutional:       General: She is not in acute distress  Appearance: Normal appearance  She is well-developed  She is not ill-appearing or diaphoretic  Eyes:      General: No scleral icterus  Right eye: No discharge  Left eye: No discharge  Extraocular Movements: Extraocular movements intact  Conjunctiva/sclera: Conjunctivae normal    Pulmonary:      Effort: Pulmonary effort is normal  No respiratory distress  Skin:     General: Skin is dry  Neurological:      Mental Status: She is alert and oriented to person, place, and time  Psychiatric:         Mood and Affect: Mood normal          Behavior: Behavior normal           I spent 10 minutes directly with the patient during this visit      Steve [address]zan[address] acknowledges that she has consented to an online visit or consultation   She understands that the online visit is based solely on information provided by her, and that, in the absence of a face-to-face physical evaluation by the physician, the diagnosis she receives is both limited and provisional in terms of accuracy and completeness  This is not intended to replace a full medical face-to-face evaluation by the physician  Oleg Pino understands and accepts these terms

## 2021-02-19 DIAGNOSIS — F98.8 ATTENTION DEFICIT DISORDER, UNSPECIFIED HYPERACTIVITY PRESENCE: ICD-10-CM

## 2021-02-19 RX ORDER — DEXTROAMPHETAMINE SACCHARATE, AMPHETAMINE ASPARTATE MONOHYDRATE, DEXTROAMPHETAMINE SULFATE AND AMPHETAMINE SULFATE 7.5; 7.5; 7.5; 7.5 MG/1; MG/1; MG/1; MG/1
30 CAPSULE, EXTENDED RELEASE ORAL DAILY
Qty: 30 CAPSULE | Refills: 0 | Status: SHIPPED | OUTPATIENT
Start: 2021-02-22 | End: 2021-03-24 | Stop reason: SDUPTHER

## 2021-03-24 DIAGNOSIS — F98.8 ATTENTION DEFICIT DISORDER, UNSPECIFIED HYPERACTIVITY PRESENCE: ICD-10-CM

## 2021-03-24 RX ORDER — DEXTROAMPHETAMINE SACCHARATE, AMPHETAMINE ASPARTATE MONOHYDRATE, DEXTROAMPHETAMINE SULFATE AND AMPHETAMINE SULFATE 7.5; 7.5; 7.5; 7.5 MG/1; MG/1; MG/1; MG/1
30 CAPSULE, EXTENDED RELEASE ORAL DAILY
Qty: 30 CAPSULE | Refills: 0 | Status: SHIPPED | OUTPATIENT
Start: 2021-03-24 | End: 2021-04-22 | Stop reason: SDUPTHER

## 2021-04-08 DIAGNOSIS — Z23 ENCOUNTER FOR IMMUNIZATION: ICD-10-CM

## 2021-04-21 ENCOUNTER — IMMUNIZATIONS (OUTPATIENT)
Dept: FAMILY MEDICINE CLINIC | Facility: HOSPITAL | Age: 51
End: 2021-04-21

## 2021-04-21 DIAGNOSIS — Z23 ENCOUNTER FOR IMMUNIZATION: Primary | ICD-10-CM

## 2021-04-21 PROCEDURE — 0001A SARS-COV-2 / COVID-19 MRNA VACCINE (PFIZER-BIONTECH) 30 MCG: CPT

## 2021-04-21 PROCEDURE — 91300 SARS-COV-2 / COVID-19 MRNA VACCINE (PFIZER-BIONTECH) 30 MCG: CPT

## 2021-04-22 DIAGNOSIS — F98.8 ATTENTION DEFICIT DISORDER, UNSPECIFIED HYPERACTIVITY PRESENCE: ICD-10-CM

## 2021-04-22 RX ORDER — DEXTROAMPHETAMINE SACCHARATE, AMPHETAMINE ASPARTATE MONOHYDRATE, DEXTROAMPHETAMINE SULFATE AND AMPHETAMINE SULFATE 7.5; 7.5; 7.5; 7.5 MG/1; MG/1; MG/1; MG/1
30 CAPSULE, EXTENDED RELEASE ORAL DAILY
Qty: 30 CAPSULE | Refills: 0 | Status: SHIPPED | OUTPATIENT
Start: 2021-04-22 | End: 2021-05-21 | Stop reason: SDUPTHER

## 2021-05-07 DIAGNOSIS — F32.A DEPRESSION, UNSPECIFIED DEPRESSION TYPE: ICD-10-CM

## 2021-05-07 RX ORDER — ESCITALOPRAM OXALATE 20 MG/1
TABLET ORAL
Qty: 90 TABLET | Refills: 1 | Status: SHIPPED | OUTPATIENT
Start: 2021-05-07 | End: 2021-11-12 | Stop reason: SDUPTHER

## 2021-05-12 ENCOUNTER — IMMUNIZATIONS (OUTPATIENT)
Dept: FAMILY MEDICINE CLINIC | Facility: HOSPITAL | Age: 51
End: 2021-05-12

## 2021-05-12 DIAGNOSIS — Z23 ENCOUNTER FOR IMMUNIZATION: Primary | ICD-10-CM

## 2021-05-12 PROCEDURE — 91300 SARS-COV-2 / COVID-19 MRNA VACCINE (PFIZER-BIONTECH) 30 MCG: CPT

## 2021-05-12 PROCEDURE — 0002A SARS-COV-2 / COVID-19 MRNA VACCINE (PFIZER-BIONTECH) 30 MCG: CPT

## 2021-05-21 DIAGNOSIS — F98.8 ATTENTION DEFICIT DISORDER, UNSPECIFIED HYPERACTIVITY PRESENCE: ICD-10-CM

## 2021-05-25 RX ORDER — DEXTROAMPHETAMINE SACCHARATE, AMPHETAMINE ASPARTATE MONOHYDRATE, DEXTROAMPHETAMINE SULFATE AND AMPHETAMINE SULFATE 7.5; 7.5; 7.5; 7.5 MG/1; MG/1; MG/1; MG/1
30 CAPSULE, EXTENDED RELEASE ORAL DAILY
Qty: 30 CAPSULE | Refills: 0 | Status: SHIPPED | OUTPATIENT
Start: 2021-05-25 | End: 2021-06-22 | Stop reason: SDUPTHER

## 2021-06-08 ENCOUNTER — OFFICE VISIT (OUTPATIENT)
Dept: FAMILY MEDICINE CLINIC | Facility: OTHER | Age: 51
End: 2021-06-08
Payer: COMMERCIAL

## 2021-06-08 VITALS
HEIGHT: 60 IN | DIASTOLIC BLOOD PRESSURE: 76 MMHG | TEMPERATURE: 97.8 F | HEART RATE: 98 BPM | RESPIRATION RATE: 18 BRPM | SYSTOLIC BLOOD PRESSURE: 110 MMHG | OXYGEN SATURATION: 98 % | WEIGHT: 135.8 LBS | BODY MASS INDEX: 26.66 KG/M2

## 2021-06-08 DIAGNOSIS — I10 ESSENTIAL HYPERTENSION: Primary | ICD-10-CM

## 2021-06-08 PROCEDURE — 3008F BODY MASS INDEX DOCD: CPT | Performed by: FAMILY MEDICINE

## 2021-06-08 PROCEDURE — 3078F DIAST BP <80 MM HG: CPT | Performed by: FAMILY MEDICINE

## 2021-06-08 PROCEDURE — 99213 OFFICE O/P EST LOW 20 MIN: CPT | Performed by: FAMILY MEDICINE

## 2021-06-08 PROCEDURE — 1036F TOBACCO NON-USER: CPT | Performed by: FAMILY MEDICINE

## 2021-06-08 PROCEDURE — 3074F SYST BP LT 130 MM HG: CPT | Performed by: FAMILY MEDICINE

## 2021-06-08 NOTE — PROGRESS NOTES
Assessment/Plan:     Diagnoses and all orders for this visit:    Essential hypertension  - BP well controlled at this time  - Continue on Lisinopril 5mg once daily and DASH diet       BMI Counseling: Body mass index is 26 52 kg/m²  The BMI is above normal  Nutrition recommendations include decreasing portion sizes, encouraging healthy choices of fruits and vegetables, consuming healthier snacks, moderation in carbohydrate intake and reducing intake of saturated and trans fat  Exercise recommendations include moderate physical activity 150 minutes/week and strength training exercises  No pharmacotherapy was ordered  Return in about 6 months (around 12/8/2021) for Recheck HTN/Annual physical    The patient indicates understanding of these issues and agrees with the plan  Subjective:      Patient ID: Treasure Lovett is a 48 y o  female  HPI  Patient presents for follow up of HTN  She is currently on Lisinopril 5mg and reports daily compliance  Patient states that she is exercising regularly and is eating a healthier diet- also limiting sodium in diet  States that at home her sbp running in 110s/120s and dbp runs in 76s  Denies any unwanted side effects of Lisinopril  The following portions of the patient's history were reviewed and updated as appropriate: allergies, current medications, past family history, past medical history, past social history, past surgical history and problem list     Review of Systems   Constitutional: Negative for chills, fever and unexpected weight change  Eyes: Negative for visual disturbance  Respiratory: Negative for cough, chest tightness and shortness of breath  Cardiovascular: Negative for chest pain, palpitations and leg swelling  Gastrointestinal: Negative for abdominal pain, constipation, diarrhea and nausea  Neurological: Negative for dizziness and headaches           Objective:  /76   Pulse 98   Temp 97 8 °F (36 6 °C)   Resp 18   Ht 5' (1 524 m)   Wt 61 6 kg (135 lb 12 8 oz)   SpO2 98%   BMI 26 52 kg/m²      Physical Exam  Vitals signs reviewed  Constitutional:       General: She is not in acute distress  Appearance: Normal appearance  She is well-developed  She is not ill-appearing, toxic-appearing or diaphoretic  HENT:      Head: Normocephalic and atraumatic  Right Ear: External ear normal       Left Ear: External ear normal    Eyes:      General: No scleral icterus  Right eye: No discharge  Left eye: No discharge  Extraocular Movements: Extraocular movements intact  Conjunctiva/sclera: Conjunctivae normal    Neck:      Musculoskeletal: Normal range of motion and neck supple  Cardiovascular:      Rate and Rhythm: Normal rate and regular rhythm  Pulses: Normal pulses  Heart sounds: Normal heart sounds  Pulmonary:      Effort: Pulmonary effort is normal  No respiratory distress  Breath sounds: Normal breath sounds  No stridor  No wheezing  Musculoskeletal:      Right lower leg: No edema  Left lower leg: No edema  Skin:     General: Skin is warm and dry  Capillary Refill: Capillary refill takes less than 2 seconds  Neurological:      Mental Status: She is alert and oriented to person, place, and time     Psychiatric:         Mood and Affect: Mood normal          Behavior: Behavior normal

## 2021-07-15 DIAGNOSIS — I10 ESSENTIAL HYPERTENSION: ICD-10-CM

## 2021-07-15 RX ORDER — LISINOPRIL 5 MG/1
TABLET ORAL
Qty: 90 TABLET | Refills: 1 | Status: SHIPPED | OUTPATIENT
Start: 2021-07-15 | End: 2021-12-13 | Stop reason: DRUGHIGH

## 2021-07-21 ENCOUNTER — HOSPITAL ENCOUNTER (OUTPATIENT)
Dept: MAMMOGRAPHY | Facility: CLINIC | Age: 51
Discharge: HOME | End: 2021-07-21
Payer: COMMERCIAL

## 2021-07-21 VITALS — HEIGHT: 60 IN | BODY MASS INDEX: 26.5 KG/M2 | WEIGHT: 135 LBS

## 2021-07-21 DIAGNOSIS — R92.8 ABNORMAL FINDINGS ON DIAGNOSTIC IMAGING OF BREAST: ICD-10-CM

## 2021-07-21 PROCEDURE — G0279 TOMOSYNTHESIS, MAMMO: HCPCS

## 2021-07-21 PROCEDURE — 77066 DX MAMMO INCL CAD BI: CPT

## 2021-08-20 DIAGNOSIS — F98.8 ATTENTION DEFICIT DISORDER, UNSPECIFIED HYPERACTIVITY PRESENCE: ICD-10-CM

## 2021-08-20 RX ORDER — DEXTROAMPHETAMINE SACCHARATE, AMPHETAMINE ASPARTATE MONOHYDRATE, DEXTROAMPHETAMINE SULFATE AND AMPHETAMINE SULFATE 7.5; 7.5; 7.5; 7.5 MG/1; MG/1; MG/1; MG/1
30 CAPSULE, EXTENDED RELEASE ORAL DAILY
Qty: 30 CAPSULE | Refills: 0 | Status: SHIPPED | OUTPATIENT
Start: 2021-08-20 | End: 2021-09-20 | Stop reason: SDUPTHER

## 2021-10-21 DIAGNOSIS — F98.8 ATTENTION DEFICIT DISORDER, UNSPECIFIED HYPERACTIVITY PRESENCE: ICD-10-CM

## 2021-10-21 RX ORDER — DEXTROAMPHETAMINE SACCHARATE, AMPHETAMINE ASPARTATE MONOHYDRATE, DEXTROAMPHETAMINE SULFATE AND AMPHETAMINE SULFATE 7.5; 7.5; 7.5; 7.5 MG/1; MG/1; MG/1; MG/1
30 CAPSULE, EXTENDED RELEASE ORAL DAILY
Qty: 30 CAPSULE | Refills: 0 | Status: SHIPPED | OUTPATIENT
Start: 2021-10-21 | End: 2021-11-18 | Stop reason: SDUPTHER

## 2021-11-12 DIAGNOSIS — F32.A DEPRESSION, UNSPECIFIED DEPRESSION TYPE: ICD-10-CM

## 2021-11-12 RX ORDER — ESCITALOPRAM OXALATE 20 MG/1
20 TABLET ORAL DAILY
Qty: 90 TABLET | Refills: 0 | Status: SHIPPED | OUTPATIENT
Start: 2021-11-12 | End: 2022-02-10 | Stop reason: SDUPTHER

## 2021-12-07 ENCOUNTER — RA CDI HCC (OUTPATIENT)
Dept: OTHER | Facility: HOSPITAL | Age: 51
End: 2021-12-07

## 2021-12-13 ENCOUNTER — OFFICE VISIT (OUTPATIENT)
Dept: FAMILY MEDICINE CLINIC | Facility: OTHER | Age: 51
End: 2021-12-13
Payer: COMMERCIAL

## 2021-12-13 VITALS
BODY MASS INDEX: 27.88 KG/M2 | DIASTOLIC BLOOD PRESSURE: 92 MMHG | RESPIRATION RATE: 18 BRPM | OXYGEN SATURATION: 98 % | HEIGHT: 60 IN | TEMPERATURE: 97.8 F | WEIGHT: 142 LBS | HEART RATE: 103 BPM | SYSTOLIC BLOOD PRESSURE: 148 MMHG

## 2021-12-13 DIAGNOSIS — Z12.31 ENCOUNTER FOR SCREENING MAMMOGRAM FOR MALIGNANT NEOPLASM OF BREAST: ICD-10-CM

## 2021-12-13 DIAGNOSIS — F33.9 RECURRENT MAJOR DEPRESSIVE DISORDER, REMISSION STATUS UNSPECIFIED (HCC): ICD-10-CM

## 2021-12-13 DIAGNOSIS — Z13.6 ENCOUNTER FOR LIPID SCREENING FOR CARDIOVASCULAR DISEASE: ICD-10-CM

## 2021-12-13 DIAGNOSIS — Z12.11 SCREEN FOR COLON CANCER: ICD-10-CM

## 2021-12-13 DIAGNOSIS — Z13.1 SCREENING FOR DIABETES MELLITUS: ICD-10-CM

## 2021-12-13 DIAGNOSIS — I10 ESSENTIAL HYPERTENSION: ICD-10-CM

## 2021-12-13 DIAGNOSIS — Z11.59 ENCOUNTER FOR HEPATITIS C SCREENING TEST FOR LOW RISK PATIENT: ICD-10-CM

## 2021-12-13 DIAGNOSIS — Z13.220 ENCOUNTER FOR LIPID SCREENING FOR CARDIOVASCULAR DISEASE: ICD-10-CM

## 2021-12-13 DIAGNOSIS — Z00.00 ANNUAL PHYSICAL EXAM: Primary | ICD-10-CM

## 2021-12-13 PROCEDURE — 1036F TOBACCO NON-USER: CPT | Performed by: FAMILY MEDICINE

## 2021-12-13 PROCEDURE — 3077F SYST BP >= 140 MM HG: CPT | Performed by: FAMILY MEDICINE

## 2021-12-13 PROCEDURE — 3008F BODY MASS INDEX DOCD: CPT | Performed by: FAMILY MEDICINE

## 2021-12-13 PROCEDURE — 99396 PREV VISIT EST AGE 40-64: CPT | Performed by: FAMILY MEDICINE

## 2021-12-13 PROCEDURE — 3080F DIAST BP >= 90 MM HG: CPT | Performed by: FAMILY MEDICINE

## 2021-12-13 RX ORDER — LISINOPRIL 10 MG/1
10 TABLET ORAL DAILY
Qty: 30 TABLET | Refills: 1 | Status: SHIPPED | OUTPATIENT
Start: 2021-12-13 | End: 2022-01-14 | Stop reason: DRUGHIGH

## 2021-12-18 ENCOUNTER — IMMUNIZATIONS (OUTPATIENT)
Dept: FAMILY MEDICINE CLINIC | Facility: HOSPITAL | Age: 51
End: 2021-12-18

## 2021-12-18 DIAGNOSIS — Z23 ENCOUNTER FOR IMMUNIZATION: Primary | ICD-10-CM

## 2021-12-18 PROCEDURE — 91300 COVID-19 PFIZER VACC 0.3 ML: CPT

## 2021-12-18 PROCEDURE — 0001A COVID-19 PFIZER VACC 0.3 ML: CPT

## 2022-01-10 ENCOUNTER — APPOINTMENT (OUTPATIENT)
Dept: LAB | Facility: AMBULARY SURGERY CENTER | Age: 52
End: 2022-01-10
Payer: COMMERCIAL

## 2022-01-10 DIAGNOSIS — Z13.6 ENCOUNTER FOR LIPID SCREENING FOR CARDIOVASCULAR DISEASE: ICD-10-CM

## 2022-01-10 DIAGNOSIS — Z11.59 ENCOUNTER FOR HEPATITIS C SCREENING TEST FOR LOW RISK PATIENT: ICD-10-CM

## 2022-01-10 DIAGNOSIS — Z13.1 SCREENING FOR DIABETES MELLITUS: ICD-10-CM

## 2022-01-10 DIAGNOSIS — Z13.220 ENCOUNTER FOR LIPID SCREENING FOR CARDIOVASCULAR DISEASE: ICD-10-CM

## 2022-01-10 LAB
ALBUMIN SERPL BCP-MCNC: 3.7 G/DL (ref 3.5–5)
ALP SERPL-CCNC: 48 U/L (ref 46–116)
ALT SERPL W P-5'-P-CCNC: 20 U/L (ref 12–78)
ANION GAP SERPL CALCULATED.3IONS-SCNC: 4 MMOL/L (ref 4–13)
AST SERPL W P-5'-P-CCNC: 18 U/L (ref 5–45)
BILIRUB SERPL-MCNC: 0.66 MG/DL (ref 0.2–1)
BUN SERPL-MCNC: 15 MG/DL (ref 5–25)
CALCIUM SERPL-MCNC: 9 MG/DL (ref 8.3–10.1)
CHLORIDE SERPL-SCNC: 108 MMOL/L (ref 100–108)
CHOLEST SERPL-MCNC: 136 MG/DL
CO2 SERPL-SCNC: 24 MMOL/L (ref 21–32)
CREAT SERPL-MCNC: 0.67 MG/DL (ref 0.6–1.3)
GFR SERPL CREATININE-BSD FRML MDRD: 102 ML/MIN/1.73SQ M
GLUCOSE P FAST SERPL-MCNC: 81 MG/DL (ref 65–99)
HCV AB SER QL: NORMAL
HDLC SERPL-MCNC: 61 MG/DL
LDLC SERPL CALC-MCNC: 61 MG/DL (ref 0–100)
POTASSIUM SERPL-SCNC: 3.6 MMOL/L (ref 3.5–5.3)
PROT SERPL-MCNC: 7.6 G/DL (ref 6.4–8.2)
SODIUM SERPL-SCNC: 136 MMOL/L (ref 136–145)
TRIGL SERPL-MCNC: 71 MG/DL

## 2022-01-10 PROCEDURE — 36415 COLL VENOUS BLD VENIPUNCTURE: CPT

## 2022-01-10 PROCEDURE — 86803 HEPATITIS C AB TEST: CPT

## 2022-01-10 PROCEDURE — 80061 LIPID PANEL: CPT

## 2022-01-10 PROCEDURE — 80053 COMPREHEN METABOLIC PANEL: CPT

## 2022-01-14 ENCOUNTER — OFFICE VISIT (OUTPATIENT)
Dept: FAMILY MEDICINE CLINIC | Facility: OTHER | Age: 52
End: 2022-01-14
Payer: COMMERCIAL

## 2022-01-14 VITALS
SYSTOLIC BLOOD PRESSURE: 154 MMHG | HEART RATE: 111 BPM | HEIGHT: 60 IN | TEMPERATURE: 98 F | RESPIRATION RATE: 18 BRPM | WEIGHT: 138.8 LBS | OXYGEN SATURATION: 98 % | DIASTOLIC BLOOD PRESSURE: 94 MMHG | BODY MASS INDEX: 27.25 KG/M2

## 2022-01-14 DIAGNOSIS — I10 ESSENTIAL HYPERTENSION: Primary | ICD-10-CM

## 2022-01-14 DIAGNOSIS — F98.8 ATTENTION DEFICIT DISORDER, UNSPECIFIED HYPERACTIVITY PRESENCE: ICD-10-CM

## 2022-01-14 PROCEDURE — 3008F BODY MASS INDEX DOCD: CPT | Performed by: FAMILY MEDICINE

## 2022-01-14 PROCEDURE — 3077F SYST BP >= 140 MM HG: CPT | Performed by: FAMILY MEDICINE

## 2022-01-14 PROCEDURE — 3080F DIAST BP >= 90 MM HG: CPT | Performed by: FAMILY MEDICINE

## 2022-01-14 PROCEDURE — 99213 OFFICE O/P EST LOW 20 MIN: CPT | Performed by: FAMILY MEDICINE

## 2022-01-14 PROCEDURE — 3725F SCREEN DEPRESSION PERFORMED: CPT | Performed by: FAMILY MEDICINE

## 2022-01-14 PROCEDURE — 1036F TOBACCO NON-USER: CPT | Performed by: FAMILY MEDICINE

## 2022-01-14 RX ORDER — DEXTROAMPHETAMINE SACCHARATE, AMPHETAMINE ASPARTATE MONOHYDRATE, DEXTROAMPHETAMINE SULFATE AND AMPHETAMINE SULFATE 7.5; 7.5; 7.5; 7.5 MG/1; MG/1; MG/1; MG/1
30 CAPSULE, EXTENDED RELEASE ORAL DAILY
Qty: 30 CAPSULE | Refills: 0 | Status: CANCELLED | OUTPATIENT
Start: 2022-01-14

## 2022-01-14 RX ORDER — LISINOPRIL 20 MG/1
20 TABLET ORAL DAILY
Qty: 30 TABLET | Refills: 1 | Status: SHIPPED | OUTPATIENT
Start: 2022-01-14 | End: 2022-04-11

## 2022-01-14 NOTE — PATIENT INSTRUCTIONS
DASH Eating Plan   WHAT YOU NEED TO KNOW:   The DASH (Dietary Approaches to Stop Hypertension) Eating Plan is designed to help prevent or lower high blood pressure  It can also help to lower LDL (bad) cholesterol and decrease your risk for heart disease  The plan is low in sodium, sugar, unhealthy fats, and total fat  It is high in potassium, calcium, magnesium, and fiber  These nutrients are added when you eat more fruits, vegetables, and whole grains  With the DASH eating plan, you need to eat a certain number of servings from each food group  This will help you get enough of certain nutrients and limit others  The amount of servings you should eat depends on how many calories you need  Your dietitian can help you create meal plans with the right number of servings for each food group  DISCHARGE INSTRUCTIONS:   What you need to know about sodium:  Your dietitian will tell you how much sodium is safe for you to have each day  People with high blood pressure should have no more than 1,500 to 2,300 mg of sodium in a day  A teaspoon (tsp) of salt has 2,300 mg of sodium  This may seem like a difficult goal, but small changes to the foods you eat can make a big difference  Your healthcare provider or dietitian can help you create a meal plan that follows your sodium limit  · Read food labels  Food labels can help you choose foods that are low in sodium  The amount of sodium is listed in milligrams (mg)  The % Daily Value (DV) column tells you how much of your daily needs are met by 1 serving of the food for each nutrient listed  Choose foods that have less than 5% of the DV of sodium  These foods are considered low in sodium  Foods that have 20% or more of the DV of sodium are considered high in sodium  Avoid foods that have more than 300 mg of sodium in each serving  Choose foods that say low-sodium, reduced-sodium, or no salt added on the food label  · Limit added salt    Do not salt food at the table if you add salt when you cook  Use herbs and spices, such as onions, garlic, and salt-free seasonings to add flavor  Try lemon or lime juice or vinegar to add a tart flavor  Use hot peppers or a small amount of hot pepper sauce to add a spicy flavor  Limit foods high in added salt, such as the following:    ? Seasonings made with salt, such as garlic salt, celery salt, onion salt, seasoned salt, meat tenderizers, and monosodium glutamate (MSG)    ? Miso soup and canned or dried soup mixes    ? Regular soy sauce, barbecue sauce, teriyaki sauce, steak sauce, Worcestershire sauce, and most flavored vinegars    ? Snack foods, such as salted chips, popcorn, pretzels, pork rinds, salted crackers, and salted nuts    ? Frozen foods, such as dinners, entrees, vegetables with sauces, and breaded meats    · Ask about salt substitutes  Ask your healthcare provider if you may use salt substitutes  Some salt substitutes have ingredients that can be harmful if you have certain health conditions  · Choose foods carefully at restaurants  Meals from restaurants, especially fast food restaurants, are often high in sodium  Some restaurants have nutrition information that tells you the amount of sodium in their foods  Ask to have your food prepared with less, or no salt  What you need to know about fats:  Healthy fats include unsaturated fats and omega-3 fatty acids  Unhealthy fats include saturated fats and trans fats  · Include healthy fats, such as the following:      ? Cooking oils, such as soybean, canola, olive, or sunflower    ? Fatty fish, such as salmon, tuna, mackerel, or sardines    ? Flaxseed oil or ground flaxseed    ? ½ cup of cooked beans, such as black beans, kidney beans, or garcia beans    ? 1½ ounces of low-sodium nuts, such as almonds or walnuts    ? Low-sugar, low-sodium peanut butter    ?  Seeds such as john seeds or sunflower seeds       · Limit or do not have unhealthy fats, such as the following: ? Foods that contain fat from animals, such as fatty meats, whole milk, butter, and cream    ? Shortening, stick margarine, palm oil, and coconut oil    ? Full-fat or creamy salad dressing    ? Creamy soup    ? Crackers, chips, and baked goods made with margarine or shortening    ? Foods that are fried in unhealthy fats    ? Gravy and sauces, such as Abhay or cheese sauces    What you need to know about carbohydrates (carbs): All carbs break down into sugar  Complex carbs contain more fiber than simple carbs  This means complex carbs go into the bloodstream more slowly and cause less of a blood sugar spike  Try to include more complex carbs and fewer simple carbs  · Include complex carbs, such as the following:      ? 1 slice of whole-grain bread    ? 1 ounce of dry cereal that does not contain added sugar    ? ½ cup of cooked oatmeal    ? 2 ounces of cooked whole-grain pasta    ? ½ cup of cooked brown rice    · Limit or do not have simple carbs, such as the following:      ? Baked goods, such as doughnuts, pastries, and cookies    ? Mixes for cornbread and biscuits    ? White rice and pasta mixes, such as boxed macaroni and cheese    ? Instant and cold cereals that contain sugar    ? Jelly, jam, and ice cream that contain sugar    ? Condiments such as ketchup    ? Drinks high in sugar, such as soft drinks, lemonade, and fruit juice    What you need to know about vegetables and fruits:  Vegetables and fruits can be fresh, frozen, or canned  If possible, try to choose low-sodium canned options  · Include a variety of vegetables and fruits, such as the following:      ? 1 medium apple, pear, or peach (about ½ cup chopped)    ? ½ small banana    ? ½ cup berries, such as blueberries, strawberries, or blackberries    ? 1 cup of raw leafy greens, such as lettuce, spinach, kale, or bo greens    ? ½ cup of frozen or canned (no added salt) vegetables, such as green beans    ?  ½ cup of fresh, frozen, or canned fruit (canned in light syrup or fruit juice)    ? ½ cup of vegetable or fruit juice    · Limit or do not have vegetables and fruits made in the following ways:      ? Frozen fruit such as cherries that have added sugar    ? Fruit in cream or butter sauce    ? Canned vegetables that are high in sodium    ? Sauerkraut, pickled vegetables, and other foods prepared in brine    ? Fried vegetables or vegetables in butter or high-fat sauces    What you need to know about protein foods:   · Include lean or low-fat protein foods, such as the following:      ? Poultry (chicken, turkey) with no skin    ? Fish (especially fatty fish, such as salmon, fresh tuna, or mackerel)    ? Lean beef and pork (loin, round, extra lean hamburger)    ? Egg whites and egg substitutes    ? 1 cup of nonfat (skim) or 1% milk    ? 1½ ounces of fat-free or low-fat cheese    ? 6 ounces of nonfat or low-fat yogurt    · Limit or do not have high-fat protein foods, such as the following:      ? Smoked or cured meat, such as corned beef, bagley, ham, hot dogs, and sausage    ? Canned beans and canned meats or spreads, such as potted meats, sardines, anchovies, and imitation seafood    ? Deli or lunch meats, such as bologna, ham, turkey, and roast beef    ? High-fat meat (T-bone steak, regular hamburger, and ribs)    ? Whole eggs and egg yolks    ? Whole milk, 2% milk, and cream    ? Regular cheese and processed cheese    Other guidelines to follow:   · Maintain a healthy weight  Your risk for heart disease is higher if you are overweight  Your healthcare provider may suggest that you lose weight if you are overweight  You can lose weight by eating fewer calories and foods that have added sugars and fat  The DASH meal plan can help you do this  Decrease calories by eating smaller portions at each meal and fewer snacks  Ask your healthcare provider for more information about how to lose weight  · Exercise regularly    Regular exercise can help you reach or maintain a healthy weight  Regular exercise can also help decrease your blood pressure and improve your cholesterol levels  Get 30 minutes or more of moderate exercise each day of the week  To lose weight, get at least 60 minutes of exercise  Talk to your healthcare provider about the best exercise program for you  · Limit alcohol  Women should limit alcohol to 1 drink a day  Men should limit alcohol to 2 drinks a day  A drink of alcohol is 12 ounces of beer, 5 ounces of wine, or 1½ ounces of liquor  For more information:   · [facility] and [address]  [address] [address]  [place] [address]  Phone: [phone]  Web Address: [facility] no    © 1685 [facility] 2021 Information is for End User's use only and may not be sold, redistributed or otherwise used for commercial purposes  All illustrations and images included in CareNotes® are the copyrighted property of A D A M , Inc  or [facility] Sheeba Macias   The above information is an  only  It is not intended as medical advice for individual conditions or treatments  Talk to your doctor, nurse or pharmacist before following any medical regimen to see if it is safe and effective for you

## 2022-01-14 NOTE — PROGRESS NOTES
Assessment/Plan:     Diagnoses and all orders for this visit:    Essential hypertension  -     lisinopril (ZESTRIL) 20 mg tablet; Take 1 tablet (20 mg total) by mouth daily      Patient's BP remains elevated despite Lisinopril dose increase from 5 to 10mg once daily made at last visit 1 month ago  Will increase dose of Lisinopril from 10mg to 20mg once daily  If BP remains elevated will likely change to Amlodipine  Patient aware that Adderall could be contributing to elevated BP, but not amenablet to discontinuing this med  Will also consider checking for secondary causes of hypertension if BP remains uncontrolled  Work up for secondary causes of hypertension done in 2015/2016 and was negative at that time  Patient advised to continue monitoring BP at home daily and to keep a log of these readings for review at our next visit  Patient also given information on DASH diet and advised to continue with regular exercise  Return in about 4 weeks (around 2/11/2022) for Recheck BP  The patient indicates understanding of these issues and agrees with the plan  Subjective:      Patient ID: Ana Rosa Kinsey is a 46 y o  female  HPI   Patient presents for follow up of uncontrolled Hypertension  At our last visit, Lisinopril was increased from 5mg to 10mg qd  Patient reports daily compliance with this medication  She reports that at home -150s and DBP in the 80s  She states she has not been limiting sodium in her diet to less than 2g daily  She does report she is doing regular physical activity throughout the week  Patient denies any lightheadedness, dizziness, headache, visual disturbance or leg swelling       The following portions of the patient's history were reviewed and updated as appropriate: allergies, current medications, past family history, past medical history, past social history, past surgical history and problem list     Review of Systems   Constitutional: Negative for appetite change and unexpected weight change  Eyes: Negative for visual disturbance  Respiratory: Negative for cough, chest tightness and shortness of breath  Cardiovascular: Negative for chest pain, palpitations and leg swelling  Gastrointestinal: Negative for abdominal pain, constipation, diarrhea and nausea  Neurological: Negative for dizziness, syncope, weakness, light-headedness, numbness and headaches  Objective:    /94   Pulse (!) 111   Temp 98 °F (36 7 °C)   Resp 18   Ht 5' (1 524 m)   Wt 63 kg (138 lb 12 8 oz)   SpO2 98%   BMI 27 11 kg/m²          Physical Exam  Vitals reviewed  Constitutional:       General: She is not in acute distress  Appearance: Normal appearance  She is not ill-appearing, toxic-appearing or diaphoretic  Eyes:      General: No scleral icterus  Right eye: No discharge  Left eye: No discharge  Extraocular Movements: Extraocular movements intact  Conjunctiva/sclera: Conjunctivae normal    Cardiovascular:      Rate and Rhythm: Normal rate and regular rhythm  Pulses: Normal pulses  Heart sounds: Normal heart sounds  Pulmonary:      Effort: Pulmonary effort is normal  No respiratory distress  Breath sounds: Normal breath sounds  No wheezing  Musculoskeletal:      Right lower leg: No edema  Left lower leg: No edema  Skin:     General: Skin is warm and dry  Neurological:      General: No focal deficit present  Mental Status: She is alert and oriented to person, place, and time     Psychiatric:         Mood and Affect: Mood normal          Behavior: Behavior normal

## 2022-01-18 DIAGNOSIS — F98.8 ATTENTION DEFICIT DISORDER, UNSPECIFIED HYPERACTIVITY PRESENCE: ICD-10-CM

## 2022-01-18 RX ORDER — DEXTROAMPHETAMINE SACCHARATE, AMPHETAMINE ASPARTATE MONOHYDRATE, DEXTROAMPHETAMINE SULFATE AND AMPHETAMINE SULFATE 7.5; 7.5; 7.5; 7.5 MG/1; MG/1; MG/1; MG/1
30 CAPSULE, EXTENDED RELEASE ORAL DAILY
Qty: 30 CAPSULE | Refills: 0 | Status: SHIPPED | OUTPATIENT
Start: 2022-01-18 | End: 2022-02-16 | Stop reason: SDUPTHER

## 2022-02-07 ENCOUNTER — TELEPHONE (OUTPATIENT)
Dept: FAMILY MEDICINE CLINIC | Facility: OTHER | Age: 52
End: 2022-02-07

## 2022-02-07 NOTE — TELEPHONE ENCOUNTER
Christine from [employer] called regarding the patient's prior auth on meds  The portal is: [employer]  [employer]  Medical chart notes are needed along with her member ID: [id number]    Their phone is [phone], fax [phone]

## 2022-02-08 ENCOUNTER — TELEPHONE (OUTPATIENT)
Dept: FAMILY MEDICINE CLINIC | Facility: OTHER | Age: 52
End: 2022-02-08

## 2022-02-10 DIAGNOSIS — F32.A DEPRESSION, UNSPECIFIED DEPRESSION TYPE: ICD-10-CM

## 2022-02-10 RX ORDER — ESCITALOPRAM OXALATE 20 MG/1
20 TABLET ORAL DAILY
Qty: 90 TABLET | Refills: 0 | Status: SHIPPED | OUTPATIENT
Start: 2022-02-10 | End: 2022-05-12

## 2022-02-11 ENCOUNTER — PREP FOR PROCEDURE (OUTPATIENT)
Dept: GASTROENTEROLOGY | Facility: CLINIC | Age: 52
End: 2022-02-11

## 2022-02-11 DIAGNOSIS — Z12.11 COLON CANCER SCREENING: Primary | ICD-10-CM

## 2022-02-12 ENCOUNTER — TELEPHONE (OUTPATIENT)
Dept: GASTROENTEROLOGY | Facility: CLINIC | Age: 52
End: 2022-02-12

## 2022-02-12 NOTE — TELEPHONE ENCOUNTER
Scheduled date of colonoscopy (as of today): 5/2/2022  Physician performing colonoscopy:Dr James  Location of colonoscopy:[facility]  Bowel prep reviewed with patient: Golytely  Instructions reviewed with patient by:Kate/annamarie  Clearances: N/A

## 2022-02-14 ENCOUNTER — OFFICE VISIT (OUTPATIENT)
Dept: FAMILY MEDICINE CLINIC | Facility: OTHER | Age: 52
End: 2022-02-14
Payer: COMMERCIAL

## 2022-02-14 VITALS
DIASTOLIC BLOOD PRESSURE: 78 MMHG | TEMPERATURE: 97.6 F | RESPIRATION RATE: 16 BRPM | SYSTOLIC BLOOD PRESSURE: 120 MMHG | BODY MASS INDEX: 26.9 KG/M2 | HEIGHT: 60 IN | HEART RATE: 90 BPM | WEIGHT: 137 LBS | OXYGEN SATURATION: 98 %

## 2022-02-14 DIAGNOSIS — I10 ESSENTIAL HYPERTENSION: Primary | ICD-10-CM

## 2022-02-14 PROCEDURE — 1036F TOBACCO NON-USER: CPT | Performed by: FAMILY MEDICINE

## 2022-02-14 PROCEDURE — 3074F SYST BP LT 130 MM HG: CPT | Performed by: FAMILY MEDICINE

## 2022-02-14 PROCEDURE — 3008F BODY MASS INDEX DOCD: CPT | Performed by: FAMILY MEDICINE

## 2022-02-14 PROCEDURE — 3078F DIAST BP <80 MM HG: CPT | Performed by: FAMILY MEDICINE

## 2022-02-14 PROCEDURE — 99213 OFFICE O/P EST LOW 20 MIN: CPT | Performed by: FAMILY MEDICINE

## 2022-02-14 RX ORDER — DEXTROAMPHETAMINE SACCHARATE, AMPHETAMINE ASPARTATE MONOHYDRATE, DEXTROAMPHETAMINE SULFATE AND AMPHETAMINE SULFATE 7.5; 7.5; 7.5; 7.5 MG/1; MG/1; MG/1; MG/1
30 CAPSULE, EXTENDED RELEASE ORAL EVERY MORNING
Qty: 30 CAPSULE | Refills: 0 | Status: CANCELLED | OUTPATIENT
Start: 2022-02-14

## 2022-02-14 NOTE — PROGRESS NOTES
Assessment/Plan:     Diagnoses and all orders for this visit:    Essential hypertension  -BP well controlled- 120/78 today  -Continue on Lisinopril 20mg once daily  Continue with DASH diet and regular exercise     Of note, unclear weather bump on left cheek is pimple versus boil however patient may continue to use acne cleanser over area  Recommend she also use warm compresses on the area  She was advised to RTO if area worsens/signs of infection  Return in about 3 months (around 5/14/2022) for Recheck BP  The patient indicates understanding of these issues and agrees with the plan  Subjective:      Patient ID: Woody Norwood is a 46 y o  female  HPI   Patient presents for BP check  At last visit, BP remained elevated despite recent increase in Lisinopril  At last visit Lisinopril was increased from 10mg once daily to 20mg once daily  Patient reports daily compliance with this medication  She also reports limiting sodium in her diet to less than 2g daily  Patient reports she has been biking daily since last visit  She reports BP at home has generally been in the 120s/80s  BP today is 120/78  Patient denies any lightheadedness, dizziness, headaches, visual disturbance, cough, chest pain, leg swelling or shortness of breath  Patient also reports large red pimple on left cheek which has been present for the past week  She reports some pain from area, but denies any discharge  She denies any fever or chills  Has tried acne medications on area with no improvement  The following portions of the patient's history were reviewed and updated as appropriate: allergies, current medications, past family history, past medical history, past social history, past surgical history and problem list     Review of Systems   Constitutional: Negative for appetite change and unexpected weight change  Eyes: Negative for visual disturbance     Respiratory: Negative for cough, chest tightness and shortness of breath  Cardiovascular: Negative for chest pain, palpitations and leg swelling  Gastrointestinal: Negative for abdominal pain, constipation, diarrhea and nausea  Neurological: Negative for dizziness, syncope, weakness, light-headedness, numbness and headaches  Objective:  /78   Pulse 90   Temp 97 6 °F (36 4 °C)   Resp 16   Ht 5' (1 524 m)   Wt 62 1 kg (137 lb)   SpO2 98%   BMI 26 76 kg/m²          Physical Exam  Vitals reviewed  Constitutional:       General: She is not in acute distress  Appearance: Normal appearance  She is not ill-appearing, toxic-appearing or diaphoretic  Eyes:      General: No scleral icterus  Right eye: No discharge  Left eye: No discharge  Extraocular Movements: Extraocular movements intact  Conjunctiva/sclera: Conjunctivae normal    Cardiovascular:      Rate and Rhythm: Normal rate and regular rhythm  Pulses: Normal pulses  Heart sounds: Normal heart sounds  Pulmonary:      Effort: Pulmonary effort is normal       Breath sounds: Normal breath sounds  Musculoskeletal:      Cervical back: Normal range of motion and neck supple  Right lower leg: No edema  Left lower leg: No edema  Skin:     General: Skin is warm and dry  Findings: Erythema (  [id number] nodular erthematous non-tender lesion on left cheek) present  Neurological:      Mental Status: She is alert     Psychiatric:         Mood and Affect: Mood normal          Behavior: Behavior normal

## 2022-02-14 NOTE — TELEPHONE ENCOUNTER
Patient asked again about her rx Addrell XR 30 mg needing a auth for her plan thru her ins please see below message from when she called on 2/7/2022

## 2022-02-16 DIAGNOSIS — F98.8 ATTENTION DEFICIT DISORDER, UNSPECIFIED HYPERACTIVITY PRESENCE: ICD-10-CM

## 2022-02-17 RX ORDER — DEXTROAMPHETAMINE SACCHARATE, AMPHETAMINE ASPARTATE MONOHYDRATE, DEXTROAMPHETAMINE SULFATE AND AMPHETAMINE SULFATE 7.5; 7.5; 7.5; 7.5 MG/1; MG/1; MG/1; MG/1
30 CAPSULE, EXTENDED RELEASE ORAL DAILY
Qty: 30 CAPSULE | Refills: 0 | Status: SHIPPED | OUTPATIENT
Start: 2022-02-17 | End: 2022-03-18 | Stop reason: SDUPTHER

## 2022-04-10 DIAGNOSIS — I10 ESSENTIAL HYPERTENSION: ICD-10-CM

## 2022-04-11 DIAGNOSIS — I10 ESSENTIAL HYPERTENSION: ICD-10-CM

## 2022-04-11 RX ORDER — LISINOPRIL 20 MG/1
TABLET ORAL
Refills: 0 | OUTPATIENT
Start: 2022-04-11

## 2022-04-11 RX ORDER — LISINOPRIL 20 MG/1
TABLET ORAL
Qty: 30 TABLET | Refills: 1 | Status: SHIPPED | OUTPATIENT
Start: 2022-04-11 | End: 2022-04-13

## 2022-04-13 DIAGNOSIS — I10 ESSENTIAL HYPERTENSION: ICD-10-CM

## 2022-04-13 RX ORDER — LISINOPRIL 20 MG/1
TABLET ORAL
Qty: 90 TABLET | Refills: 1 | Status: SHIPPED | OUTPATIENT
Start: 2022-04-13

## 2022-05-01 ENCOUNTER — ANESTHESIA (OUTPATIENT)
Dept: ANESTHESIOLOGY | Facility: HOSPITAL | Age: 52
End: 2022-05-01

## 2022-05-01 ENCOUNTER — ANESTHESIA EVENT (OUTPATIENT)
Dept: ANESTHESIOLOGY | Facility: HOSPITAL | Age: 52
End: 2022-05-01

## 2022-05-01 RX ORDER — SODIUM CHLORIDE, SODIUM LACTATE, POTASSIUM CHLORIDE, CALCIUM CHLORIDE 600; 310; 30; 20 MG/100ML; MG/100ML; MG/100ML; MG/100ML
125 INJECTION, SOLUTION INTRAVENOUS CONTINUOUS
Status: CANCELLED | OUTPATIENT
Start: 2022-05-01

## 2022-05-01 RX ORDER — LIDOCAINE HYDROCHLORIDE 10 MG/ML
0.5 INJECTION, SOLUTION EPIDURAL; INFILTRATION; INTRACAUDAL; PERINEURAL ONCE AS NEEDED
Status: CANCELLED | OUTPATIENT
Start: 2022-05-01

## 2022-05-01 NOTE — ANESTHESIA PREPROCEDURE EVALUATION
Procedure:  PRE-OP ONLY    Relevant Problems   CARDIO   (+) Essential hypertension      HEMATOLOGY   (+) Borderline anemia      NEURO/PSYCH   (+) Generalized anxiety disorder   (+) Post traumatic stress disorder   (+) Recurrent major depressive disorder (HCC)             Anesthesia Plan  ASA Score- 2     Anesthesia Type- IV sedation with anesthesia with ASA Monitors  Additional Monitors:   Airway Plan:           Plan Factors-    Chart reviewed  Patient is not a current smoker  Patient not instructed to abstain from smoking on day of procedure  Patient did not smoke on day of surgery  Induction- intravenous  Postoperative Plan-     Informed Consent- Anesthetic plan and risks discussed with patient  I personally reviewed this patient with the CRNA  Discussed and agreed on the Anesthesia Plan with the CRNA  Rivera Hollingsworth

## 2022-05-02 ENCOUNTER — ANESTHESIA (OUTPATIENT)
Dept: GASTROENTEROLOGY | Facility: AMBULARY SURGERY CENTER | Age: 52
End: 2022-05-02

## 2022-05-02 ENCOUNTER — ANESTHESIA EVENT (OUTPATIENT)
Dept: GASTROENTEROLOGY | Facility: AMBULARY SURGERY CENTER | Age: 52
End: 2022-05-02

## 2022-05-02 ENCOUNTER — HOSPITAL ENCOUNTER (OUTPATIENT)
Dept: GASTROENTEROLOGY | Facility: AMBULARY SURGERY CENTER | Age: 52
Setting detail: OUTPATIENT SURGERY
Discharge: HOME | End: 2022-05-02
Attending: INTERNAL MEDICINE | Admitting: INTERNAL MEDICINE
Payer: COMMERCIAL

## 2022-05-02 VITALS
HEART RATE: 81 BPM | BODY MASS INDEX: 25.95 KG/M2 | SYSTOLIC BLOOD PRESSURE: 140 MMHG | RESPIRATION RATE: 16 BRPM | OXYGEN SATURATION: 100 % | TEMPERATURE: 97.5 F | HEIGHT: 62 IN | WEIGHT: 141 LBS | DIASTOLIC BLOOD PRESSURE: 88 MMHG

## 2022-05-02 DIAGNOSIS — Z12.11 COLON CANCER SCREENING: ICD-10-CM

## 2022-05-02 LAB
EXT PREGNANCY TEST URINE: NEGATIVE
EXT. CONTROL: NORMAL

## 2022-05-02 PROCEDURE — 88305 TISSUE EXAM BY PATHOLOGIST: CPT | Performed by: SPECIALIST

## 2022-05-02 PROCEDURE — 81025 URINE PREGNANCY TEST: CPT | Performed by: ANESTHESIOLOGY

## 2022-05-02 PROCEDURE — 45380 COLONOSCOPY AND BIOPSY: CPT | Performed by: INTERNAL MEDICINE

## 2022-05-02 RX ORDER — SODIUM CHLORIDE, SODIUM LACTATE, POTASSIUM CHLORIDE, CALCIUM CHLORIDE 600; 310; 30; 20 MG/100ML; MG/100ML; MG/100ML; MG/100ML
125 INJECTION, SOLUTION INTRAVENOUS CONTINUOUS
Status: DISCONTINUED | OUTPATIENT
Start: 2022-05-02 | End: 2022-05-06 | Stop reason: HOSPADM

## 2022-05-02 RX ORDER — PROPOFOL 10 MG/ML
INJECTION, EMULSION INTRAVENOUS AS NEEDED
Status: DISCONTINUED | OUTPATIENT
Start: 2022-05-02 | End: 2022-05-02

## 2022-05-02 RX ORDER — LIDOCAINE HYDROCHLORIDE 10 MG/ML
0.5 INJECTION, SOLUTION EPIDURAL; INFILTRATION; INTRACAUDAL; PERINEURAL ONCE AS NEEDED
Status: DISCONTINUED | OUTPATIENT
Start: 2022-05-02 | End: 2022-05-06 | Stop reason: HOSPADM

## 2022-05-02 RX ORDER — LIDOCAINE HYDROCHLORIDE 10 MG/ML
INJECTION, SOLUTION EPIDURAL; INFILTRATION; INTRACAUDAL; PERINEURAL AS NEEDED
Status: DISCONTINUED | OUTPATIENT
Start: 2022-05-02 | End: 2022-05-02

## 2022-05-02 RX ADMIN — PROPOFOL 50 MG: 10 INJECTION, EMULSION INTRAVENOUS at 10:14

## 2022-05-02 RX ADMIN — SODIUM CHLORIDE, SODIUM LACTATE, POTASSIUM CHLORIDE, AND CALCIUM CHLORIDE: .6; .31; .03; .02 INJECTION, SOLUTION INTRAVENOUS at 09:22

## 2022-05-02 RX ADMIN — PROPOFOL 100 MG: 10 INJECTION, EMULSION INTRAVENOUS at 10:06

## 2022-05-02 RX ADMIN — PROPOFOL 50 MG: 10 INJECTION, EMULSION INTRAVENOUS at 10:10

## 2022-05-02 RX ADMIN — PROPOFOL 50 MG: 10 INJECTION, EMULSION INTRAVENOUS at 10:18

## 2022-05-02 RX ADMIN — LIDOCAINE HYDROCHLORIDE 50 MG: 10 INJECTION, SOLUTION EPIDURAL; INFILTRATION; INTRACAUDAL at 10:06

## 2022-05-02 NOTE — H&P
History and Physical - [facility]  Abdoul Rice 46 y o  female MRN: [id number]                  HPI: Abdoul Rice is a 46y o  year old female who presents for colonoscopy for CRC screening      REVIEW OF SYSTEMS: Per the HPI, and otherwise unremarkable      Historical Information   Past Medical History:   Diagnosis Date    Abnormal mammogram     last assessed: 08/14/17    Arthritis     Attention deficit disorder (ADD)     last assessed: 11/30/17    Benign essential hypertension     last assessed: 08/04/16    Borderline anemia     last assessed: 11/30/17    Depression     last assessed: 11/30/17    Generalized anxiety disorder     High serum renin     High serum renin     last assessed: 01/07/16    Hyperaldosteronism ([address] )     last assessed: 01/07/16    Hypertension     Hypokalemia     last assessed: 01/07/16    Insomnia     Left knee pain     Major depressive disorder     Microscopic hematuria     last assessed: 08/14/17    Patellofemoral arthritis of left knee     last assessed: 05/18/15    Pes anserine bursitis     last assessed: 11/12/15    Pregnancy induced hypertension     PTSD (post-traumatic stress disorder)      Past Surgical History:   Procedure Laterality Date    DENTAL SURGERY      HERNIA REPAIR       Social History   Social History     Substance and Sexual Activity   Alcohol Use Yes    Comment: Socially     Social History     Substance and Sexual Activity   Drug Use No     Social History     Tobacco Use   Smoking Status Never Smoker   Smokeless Tobacco Never Used     Family History   Problem Relation Age of Onset    Diabetes Father     Cancer Paternal Grandmother         brain    No Known Problems Mother     No Known Problems Sister     No Known Problems Daughter     No Known Problems Maternal Grandmother     No Known Problems Maternal Grandfather     No Known Problems Paternal Grandfather     No Known Problems Daughter     No Known Problems Maternal Aunt     No Known Problems Paternal Aunt        Meds/Allergies       Current Outpatient Medications:     amphetamine-dextroamphetamine (ADDERALL XR) 30 MG 24 hr capsule    escitalopram (LEXAPRO) 20 mg tablet    lisinopril (ZESTRIL) 20 mg tablet    Current Facility-Administered Medications:     lactated ringers infusion, 125 mL/hr, Intravenous, Continuous, New Bag at 05/02/22 0626    lidocaine (PF) (XYLOCAINE-MPF) 1 % injection 0 5 mL, 0 5 mL, Infiltration, Once PRN    No Known Allergies    Objective     /85   Pulse 93   Temp 97 8 °F (36 6 °C) (Temporal)   Resp 15   Ht 6' 2" (1 88 m)   Wt 64 kg (141 lb)   SpO2 100%   BMI 18 10 kg/m²       PHYSICAL EXAM    Gen: NAD  Head: NCAT  CV: RRR  CHEST: Clear  ABD: soft, NT/ND  EXT: no edema      ASSESSMENT/PLAN:  This is a 46y o  year old female here for colonoscopy, and she is stable and optimized for her procedure

## 2022-05-02 NOTE — ANESTHESIA PREPROCEDURE EVALUATION
Procedure:  COLONOSCOPY    Relevant Problems   CARDIO   (+) Essential hypertension      HEMATOLOGY   (+) Borderline anemia      NEURO/PSYCH   (+) Generalized anxiety disorder   (+) Post traumatic stress disorder   (+) Recurrent major depressive disorder (HCC)        Physical Exam    Airway    Mallampati score: II  TM Distance: >3 FB  Neck ROM: full     Dental   Comment: Implants Upper front, Secure,     Cardiovascular      Pulmonary      Other Findings        Anesthesia Plan  ASA Score- 2     Anesthesia Type- IV sedation with anesthesia with ASA Monitors  Additional Monitors:   Airway Plan:           Plan Factors-Exercise tolerance (METS): >4 METS  Chart reviewed  Patient is not a current smoker  Patient not instructed to abstain from smoking on day of procedure  Patient did not smoke on day of surgery  Induction- intravenous  Postoperative Plan-     Informed Consent- Anesthetic plan and risks discussed with patient  I personally reviewed this patient with the CRNA  Discussed and agreed on the Anesthesia Plan with the CRNA  Valentino Hernandez

## 2022-05-02 NOTE — ANESTHESIA POSTPROCEDURE EVALUATION
Post-Op Assessment Note    CV Status:  Stable  Pain Score: 0    Pain management: adequate     Mental Status:  Alert and awake   Hydration Status:  Euvolemic   PONV Controlled:  Controlled   Airway Patency:  Patent      Post Op Vitals Reviewed: Yes      Staff: Anesthesiologist, CRNA         No complications documented      /81 (05/02/22 1030)    Temp 98 9 °F (37 2 °C) (05/02/22 1030)    Pulse 81 (05/02/22 1030)   Resp 15 (05/02/22 1030)    SpO2 100 % (05/02/22 1030)

## 2022-05-09 ENCOUNTER — RA CDI HCC (OUTPATIENT)
Dept: OTHER | Facility: HOSPITAL | Age: 52
End: 2022-05-09

## 2022-05-09 NOTE — PROGRESS NOTES
[facility]  coding opportunities          Chart Reviewed number of suggestions sent to Provider: 1   Based on the patient problem list the following diagnoses are shown as active but not on the BPA      F33 9 Recurrent major depressive disorder [facility])    If this is correct please assess and document at your next visit 5/16/22      Patients Insurance        Commercial Insurance: [employer]

## 2022-05-12 DIAGNOSIS — F32.A DEPRESSION, UNSPECIFIED DEPRESSION TYPE: ICD-10-CM

## 2022-05-12 RX ORDER — ESCITALOPRAM OXALATE 20 MG/1
TABLET ORAL
Qty: 90 TABLET | Refills: 0 | Status: SHIPPED | OUTPATIENT
Start: 2022-05-12 | End: 2022-08-10

## 2022-05-16 ENCOUNTER — OFFICE VISIT (OUTPATIENT)
Dept: FAMILY MEDICINE CLINIC | Facility: OTHER | Age: 52
End: 2022-05-16
Payer: COMMERCIAL

## 2022-05-16 VITALS
WEIGHT: 143.8 LBS | TEMPERATURE: 98 F | HEIGHT: 62 IN | DIASTOLIC BLOOD PRESSURE: 84 MMHG | HEART RATE: 109 BPM | SYSTOLIC BLOOD PRESSURE: 124 MMHG | RESPIRATION RATE: 18 BRPM | OXYGEN SATURATION: 98 % | BODY MASS INDEX: 26.46 KG/M2

## 2022-05-16 DIAGNOSIS — I10 ESSENTIAL HYPERTENSION: Primary | ICD-10-CM

## 2022-05-16 PROCEDURE — 99213 OFFICE O/P EST LOW 20 MIN: CPT | Performed by: FAMILY MEDICINE

## 2022-05-16 PROCEDURE — 3074F SYST BP LT 130 MM HG: CPT | Performed by: FAMILY MEDICINE

## 2022-05-16 PROCEDURE — 3008F BODY MASS INDEX DOCD: CPT | Performed by: FAMILY MEDICINE

## 2022-05-16 PROCEDURE — 1036F TOBACCO NON-USER: CPT | Performed by: FAMILY MEDICINE

## 2022-05-16 PROCEDURE — 3079F DIAST BP 80-89 MM HG: CPT | Performed by: FAMILY MEDICINE

## 2022-05-16 RX ORDER — DEXTROAMPHETAMINE SACCHARATE, AMPHETAMINE ASPARTATE MONOHYDRATE, DEXTROAMPHETAMINE SULFATE AND AMPHETAMINE SULFATE 7.5; 7.5; 7.5; 7.5 MG/1; MG/1; MG/1; MG/1
30 CAPSULE, EXTENDED RELEASE ORAL DAILY
Qty: 30 CAPSULE | Refills: 0 | Status: CANCELLED | OUTPATIENT
Start: 2022-05-16

## 2022-05-16 NOTE — PROGRESS NOTES
Assessment/Plan:     Diagnoses and all orders for this visit:    Essential hypertension  -BP well controlled  -Continue on Lisinopril 20mg once daily and begin DASH diet  Return in about 6 months (around 11/16/2022) for Recheck HTN  The patient indicates understanding of these issues and agrees with the plan  Subjective:      Patient ID: Maty Rucker is a 46 y o  female  HPI   Patient presents for follow up of Hypertension  She reports daily compliance with Lisinopril 20mg once daily  Patient reports she has not been limiting sodium in her diet to less than 2g daily  She reports she has been monitroing BP at home regularly and states it is usually 120s/80  BP today within normal limits at 124/84  Patient denies any lightheadedness, dizziness, cough, shortness of breath, chest pain, headaches, vision changes or leg swelling  The following portions of the patient's history were reviewed and updated as appropriate: allergies, current medications, past family history, past medical history, past social history, past surgical history and problem list     Review of Systems   Constitutional: Negative for appetite change and unexpected weight change  Eyes: Negative for visual disturbance  Respiratory: Negative for cough, chest tightness and shortness of breath  Cardiovascular: Negative for chest pain, palpitations and leg swelling  Gastrointestinal: Negative for abdominal pain, constipation, diarrhea and nausea  Neurological: Negative for dizziness, syncope, weakness, light-headedness, numbness and headaches  Objective:  /86   Pulse (!) 109   Temp 98 °F (36 7 °C)   Resp 18   Ht 5' 2" (1 575 m)   Wt 65 2 kg (143 lb 12 8 oz)   SpO2 98%   BMI 26 30 kg/m²          Physical Exam  Vitals reviewed  Constitutional:       General: She is not in acute distress  Appearance: Normal appearance  She is not ill-appearing, toxic-appearing or diaphoretic     Eyes:      General: No scleral icterus  Right eye: No discharge  Left eye: No discharge  Extraocular Movements: Extraocular movements intact  Conjunctiva/sclera: Conjunctivae normal    Cardiovascular:      Rate and Rhythm: Normal rate and regular rhythm  Pulses: Normal pulses  Heart sounds: Normal heart sounds  Pulmonary:      Effort: Pulmonary effort is normal       Breath sounds: Normal breath sounds  Musculoskeletal:         General: Normal range of motion  Cervical back: Normal range of motion and neck supple  Right lower leg: No edema  Left lower leg: No edema  Skin:     General: Skin is warm and dry  Neurological:      General: No focal deficit present  Mental Status: She is alert and oriented to person, place, and time     Psychiatric:         Mood and Affect: Mood normal          Behavior: Behavior normal

## 2022-09-15 DIAGNOSIS — F98.8 ATTENTION DEFICIT DISORDER, UNSPECIFIED HYPERACTIVITY PRESENCE: ICD-10-CM

## 2022-09-15 RX ORDER — DEXTROAMPHETAMINE SACCHARATE, AMPHETAMINE ASPARTATE MONOHYDRATE, DEXTROAMPHETAMINE SULFATE AND AMPHETAMINE SULFATE 7.5; 7.5; 7.5; 7.5 MG/1; MG/1; MG/1; MG/1
30 CAPSULE, EXTENDED RELEASE ORAL DAILY
Qty: 30 CAPSULE | Refills: 0 | Status: SHIPPED | OUTPATIENT
Start: 2022-09-15 | End: 2022-10-14 | Stop reason: SDUPTHER

## 2022-09-19 DIAGNOSIS — F32.A DEPRESSION, UNSPECIFIED DEPRESSION TYPE: ICD-10-CM

## 2022-09-19 RX ORDER — ESCITALOPRAM OXALATE 20 MG/1
20 TABLET ORAL DAILY
Qty: 30 TABLET | Refills: 0 | Status: SHIPPED | OUTPATIENT
Start: 2022-09-19 | End: 2022-10-17

## 2022-09-19 NOTE — TELEPHONE ENCOUNTER
Patient called and she has no more left she said she tried to req thru mychart but wasn't letting her    Thank you

## 2022-10-13 DIAGNOSIS — I10 ESSENTIAL HYPERTENSION: ICD-10-CM

## 2022-10-13 RX ORDER — LISINOPRIL 20 MG/1
TABLET ORAL
Qty: 90 TABLET | Refills: 1 | Status: SHIPPED | OUTPATIENT
Start: 2022-10-13

## 2022-10-15 DIAGNOSIS — F32.A DEPRESSION, UNSPECIFIED DEPRESSION TYPE: ICD-10-CM

## 2022-10-17 RX ORDER — ESCITALOPRAM OXALATE 20 MG/1
TABLET ORAL
Qty: 90 TABLET | Refills: 1 | Status: SHIPPED | OUTPATIENT
Start: 2022-10-17

## 2022-11-22 ENCOUNTER — OFFICE VISIT (OUTPATIENT)
Dept: FAMILY MEDICINE CLINIC | Facility: OTHER | Age: 52
End: 2022-11-22

## 2022-11-22 VITALS
WEIGHT: 145 LBS | HEART RATE: 102 BPM | BODY MASS INDEX: 26.68 KG/M2 | DIASTOLIC BLOOD PRESSURE: 80 MMHG | SYSTOLIC BLOOD PRESSURE: 118 MMHG | HEIGHT: 62 IN | TEMPERATURE: 98 F | RESPIRATION RATE: 16 BRPM | OXYGEN SATURATION: 98 %

## 2022-11-22 DIAGNOSIS — Z12.4 SCREENING FOR CERVICAL CANCER: ICD-10-CM

## 2022-11-22 DIAGNOSIS — I10 ESSENTIAL HYPERTENSION: Primary | ICD-10-CM

## 2022-11-22 NOTE — PROGRESS NOTES
Name: Carmelina Tanner      : 1970      MRN: [id number]  Encounter Provider: Kamila Kwan DO  Encounter Date: 2022   Encounter department: [address] [place] [address] Dr Dill  Essential hypertension  Comments:  Diamond's BP has been stable within goal <140/90 both at home and in office  She is compliant with medication and reports she is tolerating well  She is recommended to continue lisinopril 20mg daily and follow a heart healthy lower sodium diet  Also discussed benefits of meditation, breathing exercises for her anxiety as this tends to increase her BP  2  Screening for cervical cancer  -     Ambulatory referral to Obstetrics / Gynecology; Future        Emotional and Mental Well-being, Sleep, Connectedness Assessment and Intervention:    Mindfulness program recommended/explained    Stress management, meditation, yoga, or sleep online resources/apps provided to patient      Therapeutic Lifestyle Change Visit:     One-on-one comprehensive counseling, coaching, and health behavior change visit completed        BMI Counseling: Body mass index is 26 52 kg/m²  The BMI is above normal  Nutrition recommendations include encouraging healthy choices of fruits and vegetables  Exercise recommendations include exercising 3-5 times per week  Rationale for BMI follow-up plan is due to patient being overweight or obese  Fely Melgar is a 45yo female who presents for f/u of HTN  She states that at home BP range is 110-20s/80 and BP is taken daily  Currently they take lisinopril to treat HTN and are compliant with their medication  When BP is high they state they do not have pain  Aggravating factors are: anxiety  Relieving factors are: listening to calming music, tries meditation that she finds on the internet, uses sleep hypnosis  Associated symptoms are: elevated heart rate at times with anxiety       Review of Systems   Constitutional: Negative for activity change  HENT: Negative for congestion  Eyes: Negative for visual disturbance  Respiratory: Negative for chest tightness  Cardiovascular: Negative for leg swelling  Gastrointestinal: Negative for abdominal pain  Allergic/Immunologic: Negative for food allergies  Neurological: Negative for light-headedness and headaches  Psychiatric/Behavioral: Negative for sleep disturbance  Current Outpatient Medications on File Prior to Visit   Medication Sig   • amphetamine-dextroamphetamine (ADDERALL XR) 30 MG 24 hr capsule Take 1 capsule (30 mg total) by mouth daily Max Daily Amount: 30 mg   • escitalopram (LEXAPRO) 20 mg tablet TAKE 1 TABLET BY MOUTH EVERY DAY   • lisinopril (ZESTRIL) 20 mg tablet TAKE 1 TABLET BY MOUTH EVERY DAY       Objective     /80   Pulse 102   Temp 98 °F (36 7 °C)   Resp 16   Ht 5' 2" (1 575 m)   Wt 65 8 kg (145 lb)   SpO2 98%   BMI 26 52 kg/m²     Physical Exam  Vitals reviewed  Constitutional:       General: She is awake  She is not in acute distress  Appearance: Normal appearance  HENT:      Head: Normocephalic and atraumatic  Right Ear: External ear normal       Left Ear: External ear normal       Mouth/Throat:      Mouth: Mucous membranes are moist       Pharynx: Oropharynx is clear  No posterior oropharyngeal erythema  Eyes:      General: No scleral icterus  Pupils: Pupils are equal, round, and reactive to light  Cardiovascular:      Rate and Rhythm: Normal rate and regular rhythm  Pulses: Normal pulses  Heart sounds: Normal heart sounds  No murmur heard  Pulmonary:      Effort: Pulmonary effort is normal  No respiratory distress  Breath sounds: Normal breath sounds  Abdominal:      General: Bowel sounds are normal       Palpations: Abdomen is soft  Tenderness: There is no abdominal tenderness  There is no guarding or rebound  Musculoskeletal:         General: Normal range of motion  Cervical back: Neck supple  Right lower leg: No edema  Left lower leg: No edema  Skin:     General: Skin is warm and dry  Capillary Refill: Capillary refill takes less than 2 seconds  Neurological:      Mental Status: She is alert and oriented to person, place, and time  Cranial Nerves: Cranial nerves are intact  No cranial nerve deficit  Psychiatric:         Attention and Perception: Attention and perception normal          Mood and Affect: Mood and affect normal          Behavior: Behavior normal  Behavior is cooperative         [facility] baldo

## 2022-12-15 DIAGNOSIS — F98.8 ATTENTION DEFICIT DISORDER, UNSPECIFIED HYPERACTIVITY PRESENCE: ICD-10-CM

## 2022-12-16 NOTE — TELEPHONE ENCOUNTER
The patient called this morning stating she is out of medication  Can this be filled before the weekend? Please advise    Thank you!

## 2022-12-19 DIAGNOSIS — F98.8 ATTENTION DEFICIT DISORDER, UNSPECIFIED HYPERACTIVITY PRESENCE: ICD-10-CM

## 2022-12-19 RX ORDER — DEXTROAMPHETAMINE SACCHARATE, AMPHETAMINE ASPARTATE MONOHYDRATE, DEXTROAMPHETAMINE SULFATE AND AMPHETAMINE SULFATE 7.5; 7.5; 7.5; 7.5 MG/1; MG/1; MG/1; MG/1
30 CAPSULE, EXTENDED RELEASE ORAL DAILY
Qty: 30 CAPSULE | Refills: 0 | Status: CANCELLED | OUTPATIENT
Start: 2022-12-19

## 2022-12-19 RX ORDER — DEXTROAMPHETAMINE SACCHARATE, AMPHETAMINE ASPARTATE MONOHYDRATE, DEXTROAMPHETAMINE SULFATE AND AMPHETAMINE SULFATE 7.5; 7.5; 7.5; 7.5 MG/1; MG/1; MG/1; MG/1
30 CAPSULE, EXTENDED RELEASE ORAL DAILY
Qty: 30 CAPSULE | Refills: 0 | Status: SHIPPED | OUTPATIENT
Start: 2022-12-19 | End: 2023-01-13 | Stop reason: SDUPTHER

## 2023-01-13 DIAGNOSIS — F98.8 ATTENTION DEFICIT DISORDER, UNSPECIFIED HYPERACTIVITY PRESENCE: ICD-10-CM

## 2023-01-13 RX ORDER — DEXTROAMPHETAMINE SACCHARATE, AMPHETAMINE ASPARTATE MONOHYDRATE, DEXTROAMPHETAMINE SULFATE AND AMPHETAMINE SULFATE 7.5; 7.5; 7.5; 7.5 MG/1; MG/1; MG/1; MG/1
30 CAPSULE, EXTENDED RELEASE ORAL DAILY
Qty: 30 CAPSULE | Refills: 0 | Status: SHIPPED | OUTPATIENT
Start: 2023-01-13

## 2023-02-14 DIAGNOSIS — F98.8 ATTENTION DEFICIT DISORDER, UNSPECIFIED HYPERACTIVITY PRESENCE: ICD-10-CM

## 2023-02-14 RX ORDER — DEXTROAMPHETAMINE SACCHARATE, AMPHETAMINE ASPARTATE MONOHYDRATE, DEXTROAMPHETAMINE SULFATE AND AMPHETAMINE SULFATE 7.5; 7.5; 7.5; 7.5 MG/1; MG/1; MG/1; MG/1
30 CAPSULE, EXTENDED RELEASE ORAL DAILY
Qty: 30 CAPSULE | Refills: 0 | Status: SHIPPED | OUTPATIENT
Start: 2023-02-14

## 2023-02-20 ENCOUNTER — RA CDI HCC (OUTPATIENT)
Dept: OTHER | Facility: HOSPITAL | Age: 53
End: 2023-02-20

## 2023-02-20 NOTE — PROGRESS NOTES
[facility]  coding opportunities       Chart reviewed, no opportunity found: CHART REVIEWED, NO OPPORTUNITY FOUND        Patients Insurance        Commercial Insurance: [employer]

## 2023-02-22 DIAGNOSIS — F98.8 ATTENTION DEFICIT DISORDER, UNSPECIFIED HYPERACTIVITY PRESENCE: Primary | ICD-10-CM

## 2023-02-22 RX ORDER — DEXTROAMPHETAMINE SACCHARATE, AMPHETAMINE ASPARTATE, DEXTROAMPHETAMINE SULFATE AND AMPHETAMINE SULFATE 3.75; 3.75; 3.75; 3.75 MG/1; MG/1; MG/1; MG/1
15 TABLET ORAL 2 TIMES DAILY
Qty: 60 TABLET | Refills: 0 | Status: SHIPPED | OUTPATIENT
Start: 2023-02-22 | End: 2023-03-23 | Stop reason: SDUPTHER

## 2023-02-23 ENCOUNTER — TELEPHONE (OUTPATIENT)
Dept: FAMILY MEDICINE CLINIC | Facility: OTHER | Age: 53
End: 2023-02-23

## 2023-02-24 ENCOUNTER — OFFICE VISIT (OUTPATIENT)
Dept: FAMILY MEDICINE CLINIC | Facility: OTHER | Age: 53
End: 2023-02-24

## 2023-02-24 VITALS
BODY MASS INDEX: 27.79 KG/M2 | DIASTOLIC BLOOD PRESSURE: 84 MMHG | HEIGHT: 62 IN | WEIGHT: 151 LBS | RESPIRATION RATE: 15 BRPM | SYSTOLIC BLOOD PRESSURE: 132 MMHG | TEMPERATURE: 98.4 F | HEART RATE: 93 BPM | OXYGEN SATURATION: 100 %

## 2023-02-24 DIAGNOSIS — Z13.1 SCREENING FOR DIABETES MELLITUS: ICD-10-CM

## 2023-02-24 DIAGNOSIS — Z12.31 ENCOUNTER FOR SCREENING MAMMOGRAM FOR MALIGNANT NEOPLASM OF BREAST: ICD-10-CM

## 2023-02-24 DIAGNOSIS — Z00.00 ANNUAL PHYSICAL EXAM: Primary | ICD-10-CM

## 2023-02-24 DIAGNOSIS — Z13.220 SCREENING FOR LIPID DISORDERS: ICD-10-CM

## 2023-02-24 NOTE — PROGRESS NOTES
ADULT ANNUAL [place] [address] PRACTICE [place]    NAME: Joesph Carey  AGE: 46 y o  SEX: female  : 1970     DATE: 3/20/2023     Assessment and Plan:     Problem List Items Addressed This Visit    None  Visit Diagnoses     Annual physical exam    -  Primary    Encounter for screening mammogram for malignant neoplasm of breast        Screening for diabetes mellitus        Relevant Orders    Comprehensive metabolic panel    Screening for lipid disorders        Relevant Orders    Lipid panel          Immunizations and preventive care screenings were discussed with patient today  Appropriate education was printed on patient's after visit summary  Counseling:  Alcohol/drug use: discussed moderation in alcohol intake, the recommendations for healthy alcohol use, and avoidance of illicit drug use  Dental Health: discussed importance of regular tooth brushing, flossing, and dental visits  Injury prevention: discussed safety/seat belts, safety helmets, smoke detectors, carbon dioxide detectors, and smoking near bedding or upholstery  Sexual health: discussed sexually transmitted diseases, partner selection, use of condoms, avoidance of unintended pregnancy, and contraceptive alternatives  · Exercise: the importance of regular exercise/physical activity was discussed  Recommend exercise 3-5 times per week for at least 30 minutes  BMI Counseling: Body mass index is 27 62 kg/m²  The BMI is above normal  Nutrition recommendations include encouraging healthy choices of fruits and vegetables  Rationale for BMI follow-up plan is due to patient being overweight or obese  Depression Screening and Follow-up Plan: Their PHQ-9 score was 10  Patient assessed for underlying major depression  Brief counseling provided and recommend additional follow-up/re-evaluation next office visit         Nutrition Assessment and Intervention:     New Nutrition Prescription completed with patient      Other interventions: Add one daily serving of fruit or vegetable each week as tolerated with emphasis on leafy green vegetable     Emotional and Mental Well-being, Sleep, Connectedness Assessment and Intervention:    Sleep/stress assessment performed    PHQ-9 or GAD7 performed for initial evaluation or follow-up        Return in about 4 weeks (around 3/24/2023) for recheck lifesyle modifications  Chief Complaint:     Chief Complaint   Patient presents with   • Physical Exam      History of Present Illness:     Adult Annual Physical   Patient here for a comprehensive physical exam  The patient reports no problems  Diet and Physical Activity  · Diet/Nutrition: she would like to get back in shape and start eating better and exercising again  She reports that her breakfast is usually a waffle or yoghurt, then apples with peanut butter and turkey sandwich   She notes difficulty with eating chips and desserts at night      · Exercise: walking  Depression Screening  PHQ-2/9 Depression Screening    Little interest or pleasure in doing things: 1 - several days  Feeling down, depressed, or hopeless: 1 - several days  Trouble falling or staying asleep, or sleeping too much: 1 - several days  Feeling tired or having little energy: 2 - more than half the days  Poor appetite or overeatin - several days  Feeling bad about yourself - or that you are a failure or have let yourself or your family down: 1 - several days  Trouble concentrating on things, such as reading the newspaper or watching television: 2 - more than half the days  Moving or speaking so slowly that other people could have noticed   Or the opposite - being so fidgety or restless that you have been moving around a lot more than usual: 1 - several days  Thoughts that you would be better off dead, or of hurting yourself in some way: 0 - not at all  PHQ-9 Score: 10   PHQ-9 Interpretation: Moderate depression        General Health  · Sleep: sleeps well  · Hearing: normal - bilateral   · Vision: goes for regular eye exams, most recent eye exam >1 year ago and wears glasses  · Dental: regular dental visits, brushes teeth twice daily and flosses teeth occasionally  /GYN Health  · Patient is: premenopausal  · Last menstrual period: December 2022  · Contraceptive method: abstinence right now; no sexual activity prior to loss of period in 2022  [place] Review of Systems:     Review of Systems   Constitutional: Negative for chills and fever  HENT: Negative for congestion and sore throat  Eyes: Negative for redness and visual disturbance  Respiratory: Negative for chest tightness and shortness of breath  Cardiovascular: Negative for chest pain and leg swelling  Gastrointestinal: Negative for abdominal pain and nausea  Endocrine: Negative for cold intolerance and heat intolerance  Genitourinary: Negative for difficulty urinating, dysuria, pelvic pain, vaginal discharge and vaginal pain  Musculoskeletal: Negative for arthralgias, back pain and neck pain  Skin: Negative for rash and wound  Neurological: Negative for light-headedness and headaches  Hematological: Does not bruise/bleed easily  Psychiatric/Behavioral: Positive for decreased concentration (was without medication for one week)  Negative for sleep disturbance and suicidal ideas        Past Medical History:     Past Medical History:   Diagnosis Date   • Abnormal mammogram     last assessed: 08/14/17   • Arthritis    • Attention deficit disorder (ADD)     last assessed: 11/30/17   • Benign essential hypertension     last assessed: 08/04/16   • Borderline anemia     last assessed: 11/30/17   • Depression     last assessed: 11/30/17   • Generalized anxiety disorder    • High serum renin    • High serum renin     last assessed: 01/07/16   • [facility]     last assessed: 01/07/16   • Hypertension    • Hypokalemia     last assessed: 01/07/16 • Insomnia    • Left knee pain    • Major depressive disorder    • Microscopic hematuria     last assessed: 08/14/17   • Patellofemoral arthritis of left knee     last assessed: 05/18/15   • Pes anserine bursitis     last assessed: 11/12/15   • Pregnancy induced hypertension    • PTSD (post-traumatic stress disorder)       Past Surgical History:     Past Surgical History:   Procedure Laterality Date   • DENTAL SURGERY     • HERNIA REPAIR        Social History:     Social History     Socioeconomic History   • Marital status: /Civil Union     Spouse name: None   • Number of children: None   • Years of education: Bachelors degree   • Highest education level: None   Occupational History   • Occupation: Licensing executive   Tobacco Use   • Smoking status: Never   • Smokeless tobacco: Never   Vaping Use   • Vaping Use: Never used   Substance and Sexual Activity   • Alcohol use: Yes     Comment: Socially   • Drug use: No   • Sexual activity: Yes     Partners: Male     Birth control/protection: Condom Male   Other Topics Concern   • None   Social History Narrative    History of bereavement    2 children     Social Determinants of Health     Financial Resource Strain: Not on file   Food Insecurity: Not on file   Transportation Needs: Not on file   Physical Activity: Not on file   Stress: Not on file   Social Connections: Not on file   Intimate Partner Violence: Not on file   Housing Stability: Not on file      Family History:     Family History   Problem Relation Age of Onset   • Diabetes Father    • Cancer Paternal Grandmother         brain   • No Known Problems Mother    • No Known Problems Sister    • No Known Problems Daughter    • No Known Problems Maternal Grandmother    • No Known Problems Maternal Grandfather    • No Known Problems Paternal Grandfather    • No Known Problems Daughter    • No Known Problems Maternal Aunt    • No Known Problems Paternal Aunt       Current Medications:     Current Outpatient Medications   Medication Sig Dispense Refill   • amphetamine-dextroamphetamine (ADDERALL) 15 MG tablet Take 1 tablet (15 mg total) by mouth 2 (two) times a day Take in AM and 4-6 hours later for second dose  Max Daily Amount: 30 mg 60 tablet 0   • escitalopram (LEXAPRO) 20 mg tablet TAKE 1 TABLET BY MOUTH EVERY DAY 90 tablet 1   • lisinopril (ZESTRIL) 20 mg tablet TAKE 1 TABLET BY MOUTH EVERY DAY 90 tablet 1   • amphetamine-dextroamphetamine (ADDERALL XR) 30 MG 24 hr capsule Take 1 capsule (30 mg total) by mouth daily Max Daily Amount: 30 mg (Patient not taking: Reported on 2/24/2023) 30 capsule 0     No current facility-administered medications for this visit  Allergies:     No Known Allergies   Physical Exam:     /84   Pulse 93   Temp 98 4 °F (36 9 °C)   Resp 15   Ht 5' 2" (1 575 m)   Wt 68 5 kg (151 lb)   SpO2 100%   BMI 27 62 kg/m²     Physical Exam  Vitals reviewed  Constitutional:       General: She is not in acute distress  Appearance: She is well-developed  She is not ill-appearing  HENT:      Head: Normocephalic and atraumatic  Eyes:      Extraocular Movements: Extraocular movements intact  Conjunctiva/sclera: Conjunctivae normal       Pupils: Pupils are equal, round, and reactive to light  Cardiovascular:      Rate and Rhythm: Normal rate  Rhythm irregular  Pulses: Normal pulses  Heart sounds: Normal heart sounds  No murmur heard  Pulmonary:      Effort: Pulmonary effort is normal  No respiratory distress  Breath sounds: Normal breath sounds  Abdominal:      General: Bowel sounds are normal       Palpations: Abdomen is soft  Tenderness: There is no abdominal tenderness  Musculoskeletal:         General: No swelling  Cervical back: Neck supple  Skin:     General: Skin is warm and dry  Capillary Refill: Capillary refill takes less than 2 seconds  Neurological:      Mental Status: She is alert and oriented to person, place, and time  Cranial Nerves: No cranial nerve deficit     Psychiatric:         Mood and Affect: Mood normal          Behavior: Behavior normal           DO Roxie Garcia

## 2023-03-02 ENCOUNTER — HOSPITAL ENCOUNTER (OUTPATIENT)
Dept: MAMMOGRAPHY | Facility: HOSPITAL | Age: 53
Discharge: HOME | End: 2023-03-02

## 2023-03-02 VITALS — BODY MASS INDEX: 27.79 KG/M2 | HEIGHT: 62 IN | WEIGHT: 151.01 LBS

## 2023-03-02 DIAGNOSIS — Z12.31 ENCOUNTER FOR SCREENING MAMMOGRAM FOR MALIGNANT NEOPLASM OF BREAST: ICD-10-CM

## 2023-03-03 NOTE — RESULT ENCOUNTER NOTE
Avery Michel,    I'm happy to report that your mammogram looks normal   Let's repeat this screening in 1 year      Take care,  Dr Ramon Simeon

## 2023-03-28 ENCOUNTER — OFFICE VISIT (OUTPATIENT)
Dept: FAMILY MEDICINE CLINIC | Facility: OTHER | Age: 53
End: 2023-03-28
Payer: COMMERCIAL

## 2023-03-28 VITALS
TEMPERATURE: 98.2 F | DIASTOLIC BLOOD PRESSURE: 70 MMHG | HEIGHT: 62 IN | HEART RATE: 86 BPM | SYSTOLIC BLOOD PRESSURE: 112 MMHG | OXYGEN SATURATION: 100 % | RESPIRATION RATE: 15 BRPM | BODY MASS INDEX: 26.68 KG/M2 | WEIGHT: 145 LBS

## 2023-03-28 DIAGNOSIS — Z71.3 ENCOUNTER FOR WEIGHT LOSS COUNSELING: ICD-10-CM

## 2023-03-28 NOTE — PROGRESS NOTES
Name: Tl Buckley      : 1970      MRN: [id number]  Encounter Provider: Ashutosh Thomson DO  Encounter Date: 3/28/2023   Encounter department: [address] [place] [address]      1  BMI 26 0-26 9,adult    2  Encounter for weight loss counseling    Pt is a 45 yo F w/ a PMH of HTN, MDD, and anxiety who presents for follow-up of lifestyle medication management  Lifestyle Medicine Management/ Weight Loss Counseling  Pt has lost about 6 lbs since her prior appointment one month ago and started making lifestyle changes as discussed below   -Counseling provided on new habit forming and breaking old habits with strategies such as coupling behaviors, healthier alternatives to tempting/trigger foods, and the added benefits of walking at least 3-5 times weekly with an eventual goal of >150 minutes to help with weight loss  -Follow-up in 8 weeks to recheck lifestyle changes  Nutrition Assessment and Intervention:     Reviewed and updated Nutrition Prescription      Physical Activity Assessment and Intervention:    Reviewed and updated Physical Activity Prescription with patient    Physical activity online resources/apps provided to patient      Emotional and Mental Well-being, Sleep, Connectedness Assessment and Intervention:    Sleep/stress assessment performed    Counseled regarding sleep hygiene and aspects of healthy sleep      Tobacco and Toxic Substance Assessment and Intervention:     Tobacco use screening performed    Alcohol and drug use screening performed      Therapeutic Lifestyle Change Visit:     One-on-one comprehensive counseling, coaching, and health behavior change visit completed      Return for Recheck 8 weeks lifestyle modification  The patient indicates understanding of these issues and agrees with the plan  Subjective      Pt is a 45 yo F w/ a PMH of HTN, MDD, and anxiety who presents for follow-up of lifestyle medication management    Pt has stopped eating "chips and candy and indulg  Has lost about 6 lbs since his prior appointment one month ago  Pt is trying to walk more each day with goal of 5 times weekly  Has 1-2 glasses of wine some weekends  She reports she does not smoke  Pt reports difficulty recently keeping up with her wellness goals and daily plans due to decreased focus as her usual adderall medication being on back-order  LMP March 5th with the one prior being in December, 2022  Review of Systems   Constitutional: Negative for chills and fever  HENT: Negative for ear pain and sore throat  Eyes: Negative for pain  Respiratory: Negative for chest tightness and shortness of breath  Cardiovascular: Negative for chest pain and palpitations  Gastrointestinal: Negative for abdominal pain, diarrhea, nausea and vomiting  Endocrine: Negative for cold intolerance and heat intolerance  Genitourinary: Negative for dysuria and flank pain  Musculoskeletal: Negative for neck pain and neck stiffness  Skin: Negative for rash  Neurological: Negative for dizziness, seizures and light-headedness  Psychiatric/Behavioral: Positive for decreased concentration  Current Outpatient Medications on File Prior to Visit   Medication Sig   • lisinopril (ZESTRIL) 20 mg tablet TAKE 1 TABLET BY MOUTH EVERY DAY   • amphetamine-dextroamphetamine (ADDERALL XR) 30 MG 24 hr capsule Take 1 capsule (30 mg total) by mouth daily Max Daily Amount: 30 mg (Patient not taking: Reported on 2/24/2023)       Objective     /70   Pulse 86   Temp 98 2 °F (36 8 °C)   Resp 15   Ht 5' 2\" (1 575 m)   Wt 65 8 kg (145 lb)   LMP 12/07/2022   SpO2 100%   BMI 26 52 kg/m²     Physical Exam  Vitals reviewed  Constitutional:       General: She is not in acute distress  Appearance: Normal appearance  She is not ill-appearing or toxic-appearing  Comments: Body mass index is 26 52 kg/m²  HENT:      Head: Normocephalic and atraumatic        Mouth/Throat:      " Mouth: Mucous membranes are moist       Pharynx: No oropharyngeal exudate  Eyes:      General: No scleral icterus  Conjunctiva/sclera: Conjunctivae normal    Cardiovascular:      Rate and Rhythm: Normal rate and regular rhythm  Pulses: Normal pulses  Heart sounds: Normal heart sounds  No murmur heard  No friction rub  No gallop  Pulmonary:      Effort: Pulmonary effort is normal  No respiratory distress  Breath sounds: Normal breath sounds  No wheezing or rales  Abdominal:      General: Abdomen is flat  Bowel sounds are normal  There is no distension  Palpations: Abdomen is soft  Tenderness: There is no abdominal tenderness  There is no guarding  Musculoskeletal:      Right lower leg: No edema  Left lower leg: No edema  Skin:     General: Skin is warm  Coloration: Skin is not jaundiced  Neurological:      Mental Status: She is alert and oriented to person, place, and time     Psychiatric:         Mood and Affect: Mood normal          Behavior: Behavior normal      Labs reviewed: N/A  Imaging reviewed: bilateral screening mammogram    Allison Huffman DO

## 2023-05-26 DIAGNOSIS — F98.8 ATTENTION DEFICIT DISORDER, UNSPECIFIED HYPERACTIVITY PRESENCE: ICD-10-CM

## 2023-05-26 RX ORDER — DEXTROAMPHETAMINE SACCHARATE, AMPHETAMINE ASPARTATE, DEXTROAMPHETAMINE SULFATE AND AMPHETAMINE SULFATE 3.75; 3.75; 3.75; 3.75 MG/1; MG/1; MG/1; MG/1
15 TABLET ORAL 2 TIMES DAILY
Qty: 60 TABLET | Refills: 0 | Status: SHIPPED | OUTPATIENT
Start: 2023-05-26

## 2023-06-26 DIAGNOSIS — F98.8 ATTENTION DEFICIT DISORDER, UNSPECIFIED HYPERACTIVITY PRESENCE: ICD-10-CM

## 2023-06-26 RX ORDER — DEXTROAMPHETAMINE SACCHARATE, AMPHETAMINE ASPARTATE, DEXTROAMPHETAMINE SULFATE AND AMPHETAMINE SULFATE 3.75; 3.75; 3.75; 3.75 MG/1; MG/1; MG/1; MG/1
15 TABLET ORAL 2 TIMES DAILY
Qty: 60 TABLET | Refills: 0 | Status: SHIPPED | OUTPATIENT
Start: 2023-06-26

## 2023-07-25 DIAGNOSIS — F98.8 ATTENTION DEFICIT DISORDER, UNSPECIFIED HYPERACTIVITY PRESENCE: ICD-10-CM

## 2023-07-26 RX ORDER — DEXTROAMPHETAMINE SACCHARATE, AMPHETAMINE ASPARTATE, DEXTROAMPHETAMINE SULFATE AND AMPHETAMINE SULFATE 3.75; 3.75; 3.75; 3.75 MG/1; MG/1; MG/1; MG/1
15 TABLET ORAL 2 TIMES DAILY
Qty: 60 TABLET | Refills: 0 | Status: SHIPPED | OUTPATIENT
Start: 2023-07-26

## 2023-08-24 DIAGNOSIS — F98.8 ATTENTION DEFICIT DISORDER, UNSPECIFIED HYPERACTIVITY PRESENCE: ICD-10-CM

## 2023-08-24 RX ORDER — DEXTROAMPHETAMINE SACCHARATE, AMPHETAMINE ASPARTATE, DEXTROAMPHETAMINE SULFATE AND AMPHETAMINE SULFATE 3.75; 3.75; 3.75; 3.75 MG/1; MG/1; MG/1; MG/1
15 TABLET ORAL 2 TIMES DAILY
Qty: 60 TABLET | Refills: 0 | Status: SHIPPED | OUTPATIENT
Start: 2023-08-24

## 2023-08-24 NOTE — TELEPHONE ENCOUNTER
Hello, good morning. My name is Gilda Zafar, last name is chelsie [facility]. My YOB: 1970 and [place] [phone]. Let me give you my home number. It's my ADHD [phone]. I'm calling to get a medication refill on my monthly medication. It is the Adderall 15 Johanna twice a day and I get it filled at the [facility] in [place] ([place] and I think that's it. So thank you so much.  rod Herrmann. oriented to person, place and time

## 2023-09-22 DIAGNOSIS — F98.8 ATTENTION DEFICIT DISORDER, UNSPECIFIED HYPERACTIVITY PRESENCE: ICD-10-CM

## 2023-09-22 RX ORDER — DEXTROAMPHETAMINE SACCHARATE, AMPHETAMINE ASPARTATE, DEXTROAMPHETAMINE SULFATE AND AMPHETAMINE SULFATE 3.75; 3.75; 3.75; 3.75 MG/1; MG/1; MG/1; MG/1
15 TABLET ORAL 2 TIMES DAILY
Qty: 60 TABLET | Refills: 0 | Status: SHIPPED | OUTPATIENT
Start: 2023-09-22

## 2023-10-16 DIAGNOSIS — F32.A DEPRESSION, UNSPECIFIED DEPRESSION TYPE: ICD-10-CM

## 2023-10-16 RX ORDER — ESCITALOPRAM OXALATE 20 MG/1
20 TABLET ORAL DAILY
Qty: 90 TABLET | Refills: 1 | Status: SHIPPED | OUTPATIENT
Start: 2023-10-16

## 2023-10-16 NOTE — TELEPHONE ENCOUNTER
Hi, my name is Maxim Dupontnico, spelled Saima Jacob. My YOB: 1970. I'm calling because my prescription ran out for the Lexapro 20 Johanna. It's that's the previous one has been at [facility]. [phone] is [facility]'s number, but it should be in the system for that particular subscription. My phone number is [phone]. Again, it's the 90 day refill for 20 megs of the Lexapro. Thanks so much.  Bye, bye.

## 2023-10-20 DIAGNOSIS — F98.8 ATTENTION DEFICIT DISORDER, UNSPECIFIED HYPERACTIVITY PRESENCE: ICD-10-CM

## 2023-10-20 RX ORDER — DEXTROAMPHETAMINE SACCHARATE, AMPHETAMINE ASPARTATE, DEXTROAMPHETAMINE SULFATE AND AMPHETAMINE SULFATE 3.75; 3.75; 3.75; 3.75 MG/1; MG/1; MG/1; MG/1
15 TABLET ORAL 2 TIMES DAILY
Qty: 60 TABLET | Refills: 0 | Status: SHIPPED | OUTPATIENT
Start: 2023-10-20

## 2023-10-20 NOTE — TELEPHONE ENCOUNTER
Hello, Happy Friday. My name is Zi Wood. My YOB: 1970 and I'm calling if to see if I can get a refill submitted because the weekends coming up and I have a prescription that's coming due running out. The prescription is the Adderall 15 Johanna, twice a day, instant release quantity 60. Because it's twice a day, it's a monthly prescription. I currently have it at [facility] DR MORTEZA SALAS, which is their phone number [phone] and then my home phone number is [phone]. I think that's it. Any questions, please let me know and thank you so much.  Alize.

## 2023-10-20 NOTE — TELEPHONE ENCOUNTER
Please call patient and have her schedule to be seen within the next 30 days. PDMP reviewed and appropriate, no red flags.

## 2023-11-02 ENCOUNTER — OFFICE VISIT (OUTPATIENT)
Dept: FAMILY MEDICINE CLINIC | Facility: OTHER | Age: 53
End: 2023-11-02
Payer: COMMERCIAL

## 2023-11-02 VITALS
TEMPERATURE: 98 F | WEIGHT: 147 LBS | HEART RATE: 97 BPM | DIASTOLIC BLOOD PRESSURE: 84 MMHG | HEIGHT: 62 IN | BODY MASS INDEX: 27.05 KG/M2 | SYSTOLIC BLOOD PRESSURE: 122 MMHG | RESPIRATION RATE: 16 BRPM | OXYGEN SATURATION: 99 %

## 2023-11-02 DIAGNOSIS — F41.1 GENERALIZED ANXIETY DISORDER: ICD-10-CM

## 2023-11-02 DIAGNOSIS — Z71.3 ENCOUNTER FOR WEIGHT LOSS COUNSELING: Primary | ICD-10-CM

## 2023-11-02 DIAGNOSIS — I10 ESSENTIAL HYPERTENSION: ICD-10-CM

## 2023-11-02 PROCEDURE — 99213 OFFICE O/P EST LOW 20 MIN: CPT

## 2023-11-02 NOTE — PATIENT INSTRUCTIONS
Going to give you 3 lifestyle goals:    1) Moderate exercise 5 days a week for 30 minutes. Aim for something like moderate walking or going on your exercise bike. Try to do this at lunchtime, but if work is busy, make sure to do in the evening. 2) Before you workout, make sure to drink a glass of water. Recommend drinking a glass of water after your workout as well. 3) Eat a serving of vegetables with lunch every lunch    This week, I would like for you to clean off the dust off of your bike and look for a comfortable seat.     Follow-up with us in 3 months

## 2023-11-02 NOTE — PROGRESS NOTES
Lifestyle Medicine Office Visit  Eliot Arias 48 y.o. female   MRN: [id number] : 1970  ENCOUNTER: 2023 11:34 PM    Assessment and Plan   BMI 26.0-26.9,adult  Made 3 lifestyle medicine goals with the patient today. 1) engage in moderate exercise either by going for a brisk walk or using her exercise bike at home for 30 minutes 5 days a week. Aim to do this around your lunchtime break but if unable to do this due to busy work day make sure to do it in the evening. 2) drink a glass of water before and after your workout. 3) Eat a serving of vegetables with lunch every lunch. Return in 3 months for lifestyle medicine follow-up. Essential hypertension  Stable. Continue zestril. Lifestyle Medicine Interventions      Reason for Visit     Chief Complaint   Patient presents with    lifestyle modification     Discussing exercise, diet, getting her act together and stopping procrastinating       History of Present Illness   Eliot Arias is a 48y.o.-year-old female who presents today for lifestyle medicine appointment follow-up. Pmhx HTN, MDD, anxiety. Last seen in 2023, returns for lifestyle medicine appt. Made goals with prior physician to add walking, goal of 150 minutes a week. Stopped eating chips and candy. Occasionally drinks 1-2 glasses of wine on the weekends. Wellness obstacles included her adderall medication being on backorder. Last visit noted a 6 lb weight loss in 1 month. She weighed in at 145 lbs. Patient wanted to mostly speak about her issues with procrastination and being organized as well as her overall goals of eating better, drinking more water and losing weight. She presents today recalling no concrete goals were made for today's follow-up. She just remembers being told to generally lose weight and eat better. She expresses some disappointment avoiding in at 147 pounds today after weighing 145 pounds last visit.   Patient reports that she was around 120 pounds prior to COVID and says that after the pandemic began she began to become more sedentary and was not able to go outside and walk as much as she usually did. She has been able to work from home as a result of the pandemic whereas prior to that she had a 3-hour total commute to [facility] for work. Some of her responsibilities include taking her daughter to hockey practice in [place]. Diet:  Patient typically eats an Eggo waffle in the morning. She will typically crave snack around 10 AM.  Typically she craves something sweet or chips for snack but has been trying to cut down on this lately. Her spouse likes to cook and will typically provide a healthy meal for dinner. Lunch, patient has been trying to eat more salads and will think about eating more yogurt assortment of healthy nuts. Patient also thinks she has not been drinking enough water as well. Weight loss/exercise:  Patient has been unable to stick with a routine for exercise. Patient notes that recently she will typically have time to walk on the weekends where she will walk up to 5 miles but has trouble with walking consistently throughout the week. However, patient is very excited and motivated to be involved in the [facility] [employer] which she has done every year for at least 10 years. Procrastination:  Patient reports that she has always been a procrastinator and she feels like this has been an obstacle for reaching some of her goals on top of the fact that she is busy with work and her family and home responsibilities. Review of Systems   Review of Systems   Constitutional:  Negative for activity change, appetite change and unexpected weight change. Musculoskeletal:  Negative for gait problem. Psychiatric/Behavioral:  The patient is nervous/anxious (prostination/work/responsibilities).         Active Problem List     Patient Active Problem List   Diagnosis    Abnormal mammogram    Attention deficit disorder (ADD)    Borderline anemia    Recurrent major depressive disorder (HCC)    Generalized anxiety disorder    Insomnia    Post traumatic stress disorder    Essential hypertension    BMI 26.0-26.9,adult       Past Medical History, Past Surgical History, Family History, and Social History were reviewed and updated today as appropriate. Objective   /84   Pulse 97   Temp 98 °F (36.7 °C)   Resp 16   Ht 5' 2" (1.575 m)   Wt 66.7 kg (147 lb)   SpO2 99%   BMI 26.89 kg/m²     Physical Exam  Vitals and nursing note reviewed. Constitutional:       General: She is not in acute distress. Appearance: Normal appearance. HENT:      Head: Normocephalic and atraumatic. Pulmonary:      Effort: No respiratory distress. Neurological:      Mental Status: She is alert and oriented to person, place, and time.    Psychiatric:         Mood and Affect: Mood normal.         Behavior: Behavior normal.         Pertinent Laboratory/Diagnostic Studies:  Lab Results   Component Value Date    GLUCOSE 84 12/31/2015    BUN 15 01/10/2022    CREATININE 0.67 01/10/2022    CALCIUM 9.0 01/10/2022     12/31/2015    K 3.6 01/10/2022    CO2 24 01/10/2022     01/10/2022       No results found for: "TSH"    Lab Results   Component Value Date    CHOL 124 02/08/2014     Lab Results   Component Value Date    TRIG 71 01/10/2022     Lab Results   Component Value Date    HDL 61 01/10/2022     Lab Results   Component Value Date    LDLCALC 61 01/10/2022     Lab Results   Component Value Date    HGBA1C 5.3 08/11/2017       Results for orders placed or performed during the hospital encounter of 05/02/22   Urine Pregnancy (Holding Area Only) POCT   Result Value Ref Range    EXT Preg Test, Ur Negative Negative    Control Valid Valid   Tissue Exam   Result Value Ref Range    Case Report       Surgical Pathology Report                         Case: [id number]                                   Authorizing Provider:  Yessy Murillo Jeanette Thurston DO    Collected:           05/02/2022 1025              Ordering Location:     [facility]        Received:            05/02/2022 [address] [facility] [address]                                                    Pathologist:           Willi Johnson MD                                                     Specimen:    Polyp, Colorectal, sigmoid colon polyp                                                     Final Diagnosis       A. Polyp, Colorectal, sigmoid colon polyp:    - Tubular adenoma. - Negative for high grade dysplasia. Additional Information       All reported additional testing was performed with appropriately reactive controls. These tests were developed and their performance characteristics determined by [facility] or appropriate performing facility, though some tests may be performed on tissues which have not been validated for performance characteristics (such as staining performed on alcohol exposed cell blocks and decalcified tissues). Results should be interpreted with caution and in the context of the patients’ clinical condition. These tests may not be cleared or approved by the U.S. Food and Drug Administration, though the FDA has determined that such clearance or approval is not necessary. These tests are used for clinical purposes and they should not be regarded as investigational or for research. This laboratory has been approved by [facility], designated as a high-complexity laboratory and is qualified to perform these tests. Interpretation performed at [facility], [address] [facility] [address] [facility]. Synoptic Checklist          COLON/RECTUM POLYP FORM - GI - A          :    Adenoma(s)      Gross Description          A. The specimen is received in formalin, labeled with the patient's name and hospital number, and is designated "sigmoid colon polyp”.   The specimen consists of 1 fragment of tan-red soft tissue that is 0.3 cm. Entirely submitted. Screened cassette. Note: The estimated total formalin fixation time based upon information provided by the submitting clinician and the standard processing schedule is under 72 hours. LCrispina           No orders of the defined types were placed in this encounter. Current Medications     Current Outpatient Medications   Medication Sig Dispense Refill    amphetamine-dextroamphetamine (ADDERALL) 15 MG tablet Take 1 tablet (15 mg total) by mouth 2 (two) times a day Take in AM and 4-6 hours later for second dose. Max Daily Amount: 30 mg 60 tablet 0    escitalopram (LEXAPRO) 20 mg tablet Take 1 tablet (20 mg total) by mouth daily 90 tablet 1    lisinopril (ZESTRIL) 20 mg tablet TAKE 1 TABLET BY MOUTH EVERY DAY 90 tablet 1     No current facility-administered medications for this visit. ALLERGIES:  No Known Allergies      Umer Murry MD   [address] [employer]  11/2/2023  11:34 PM    Parts of this note were dictated using [employer] dictation software and may have sounds-like errors due to variation in pronunciation. ROS: GENERAL: +fevers, no chills HEENT: no epistaxis, no eye pain, no ear, no throat pain CARDIAC: +chest pain, +shortness of breath PULM: +cough, +shortness of breath GI: no nausea, no vomiting, no diarrhea,  no abdominal pain, no hematemesis, no bright red blood per rectum : no dysuria, no hematuria EXTREMITIES: no arm pain, no leg pain, no back pain SKIN: no purpura, no petechiae NEURO: no headache, no neck pain, no loss of strength/sensation HEME: no easy bruising, no easy bleeding

## 2023-11-03 NOTE — ASSESSMENT & PLAN NOTE
Made 3 lifestyle medicine goals with the patient today. 1) engage in moderate exercise either by going for a brisk walk or using her exercise bike at home for 30 minutes 5 days a week. Aim to do this around your lunchtime break but if unable to do this due to busy work day make sure to do it in the evening. 2) drink a glass of water before and after your workout. 3) Eat a serving of vegetables with lunch every lunch. Return in 3 months for lifestyle medicine follow-up.

## 2023-11-17 DIAGNOSIS — F98.8 ATTENTION DEFICIT DISORDER, UNSPECIFIED HYPERACTIVITY PRESENCE: ICD-10-CM

## 2023-11-17 RX ORDER — DEXTROAMPHETAMINE SACCHARATE, AMPHETAMINE ASPARTATE, DEXTROAMPHETAMINE SULFATE AND AMPHETAMINE SULFATE 3.75; 3.75; 3.75; 3.75 MG/1; MG/1; MG/1; MG/1
15 TABLET ORAL 2 TIMES DAILY
Qty: 60 TABLET | Refills: 0 | Status: SHIPPED | OUTPATIENT
Start: 2023-11-17

## 2023-11-17 NOTE — TELEPHONE ENCOUNTER
Hi, my name is Jeff Miles. My YOB: 1970 and I'm just calling to have my the next order of my monthly medication which is the Adderall Instant Release 15 megs twice a day and I currently have it at the [facility] in [place] ([place]). My home number is [phone]. Thanks and have a nice Thanksgiving.  Alize.

## 2023-12-19 DIAGNOSIS — F98.8 ATTENTION DEFICIT DISORDER, UNSPECIFIED HYPERACTIVITY PRESENCE: ICD-10-CM

## 2023-12-19 RX ORDER — DEXTROAMPHETAMINE SACCHARATE, AMPHETAMINE ASPARTATE, DEXTROAMPHETAMINE SULFATE AND AMPHETAMINE SULFATE 3.75; 3.75; 3.75; 3.75 MG/1; MG/1; MG/1; MG/1
15 TABLET ORAL 2 TIMES DAILY
Qty: 60 TABLET | Refills: 0 | Status: SHIPPED | OUTPATIENT
Start: 2023-12-19

## 2023-12-19 NOTE — TELEPHONE ENCOUNTER
Hi, my name is Diamond Esparza, last name is MARTIN. My YOB: 1970 and my phone number is [phone]. I'm just calling to get my monthly medication refill for. It's the Adderall 15 Johanna twice a day. It's at. The prescription I get is at [facility]. Pharmacy phone number is [phone]. It's the [facility] in [place]. And just getting asking for a refill for my monthly prescription. I think that's it. Thank you so much. rod Herrmann.

## 2024-01-17 DIAGNOSIS — F98.8 ATTENTION DEFICIT DISORDER, UNSPECIFIED HYPERACTIVITY PRESENCE: ICD-10-CM

## 2024-01-17 RX ORDER — DEXTROAMPHETAMINE SACCHARATE, AMPHETAMINE ASPARTATE, DEXTROAMPHETAMINE SULFATE AND AMPHETAMINE SULFATE 3.75; 3.75; 3.75; 3.75 MG/1; MG/1; MG/1; MG/1
15 TABLET ORAL 2 TIMES DAILY
Qty: 60 TABLET | Refills: 0 | Status: SHIPPED | OUTPATIENT
Start: 2024-01-17

## 2024-01-17 NOTE — TELEPHONE ENCOUNTER
Lili, my name is Diamond Esparza. Last name is spelled FORISTER. I'm calling to get a prescription refill. My YOB: 1970 and the medication needing a refill is the Adderall Instant release 15 Johanna twice a day. So it's quantity 60 for the month and my pharmacy is a use the [facility] in [place] for this one. The phone number is [phone] and then my home phone number is [phone] and I think that's it. But if you need anything else, just let me know. Thank you so much and have a good day.

## 2024-02-14 DIAGNOSIS — F98.8 ATTENTION DEFICIT DISORDER, UNSPECIFIED HYPERACTIVITY PRESENCE: ICD-10-CM

## 2024-02-14 RX ORDER — DEXTROAMPHETAMINE SACCHARATE, AMPHETAMINE ASPARTATE, DEXTROAMPHETAMINE SULFATE AND AMPHETAMINE SULFATE 3.75; 3.75; 3.75; 3.75 MG/1; MG/1; MG/1; MG/1
15 TABLET ORAL 2 TIMES DAILY
Qty: 60 TABLET | Refills: 0 | Status: SHIPPED | OUTPATIENT
Start: 2024-02-14

## 2024-02-14 NOTE — TELEPHONE ENCOUNTER
Lili, my name is Diamond Esparza. Last name is spelled FORISTER. My YOB: 1970. My cell phone number is [phone] and I'm calling to request an order for my monthly prescription, which is the Adderall 15 Johanna Instant release twice a day. And my pharmacy I used for that is the [facility] in [place], the number [phone] and I think that's everything. I'm leaving for a trip tomorrow afternoon out of [place], so I was hoping to get it before I go. If you need any other information please let me know. Thank you and Happy Gregory

## 2024-02-15 ENCOUNTER — OFFICE VISIT (OUTPATIENT)
Dept: FAMILY MEDICINE CLINIC | Facility: OTHER | Age: 54
End: 2024-02-15
Payer: COMMERCIAL

## 2024-02-15 VITALS
DIASTOLIC BLOOD PRESSURE: 70 MMHG | WEIGHT: 147.6 LBS | RESPIRATION RATE: 18 BRPM | SYSTOLIC BLOOD PRESSURE: 122 MMHG | HEIGHT: 62 IN | BODY MASS INDEX: 27.16 KG/M2 | OXYGEN SATURATION: 97 % | TEMPERATURE: 98 F | HEART RATE: 103 BPM

## 2024-02-15 DIAGNOSIS — Z13.1 SCREENING FOR DIABETES MELLITUS: ICD-10-CM

## 2024-02-15 DIAGNOSIS — G47.00 INSOMNIA, UNSPECIFIED TYPE: ICD-10-CM

## 2024-02-15 DIAGNOSIS — Z13.220 SCREENING FOR LIPID DISORDERS: ICD-10-CM

## 2024-02-15 PROCEDURE — 99213 OFFICE O/P EST LOW 20 MIN: CPT

## 2024-02-15 NOTE — PROGRESS NOTES
Name: Diamond Carey      : 1970      MRN: [id number]  Encounter Provider: Dianelys Calvert DO  Encounter Date: 2/15/2024   Encounter department: [facility]    Assessment & Plan     1. BMI 26.0-26.9,adult    2. Insomnia, unspecified type    3. Screening for diabetes mellitus  -     Comprehensive metabolic panel; Future    4. Screening for lipid disorders  -     Lipid panel; Future         Goals set today:  Work on walking for 15 minutes a day in the morning before work, in place of previous hour-plus-long commute.  Work on drinking water in conjunction with this workout.  Complete an exercise log to review at our follow-up visit in one month.  Work on doing three things a day for herself, including working out and drinking more water.  Brainstorm ideas for keeping her hands busy to minimize snacking during the evenings, discussed today the possibility of completing yarn crafts or other activities to occupy her hands.      Nutrition Assessment and Intervention:     Ordered nutritional assessment labs      Physical Activity Assessment and Intervention:    Activity journal completion recommended    Physical Activity Prescription completed with patient    Reviewed and updated Physical Activity Prescription with patient    Physical activity online resources/apps provided to patient      Emotional and Mental Well-being, Sleep, Connectedness Assessment and Intervention:    Sleep/stress assessment performed    Counseled regarding sleep hygiene and aspects of healthy sleep      Tobacco and Toxic Substance Assessment and Intervention:     Tobacco use screening performed      Therapeutic Lifestyle Change Visit:     One-on-one comprehensive counseling, coaching, and health behavior change visit completed         Return in about 4 weeks (around 3/14/2024) for Annual physical, follow up exercise log.    Subjective     Diamond presents to clinic today to follow up on lifestyle changes.  She  states she is currently working from home full-time, and notices she often puts things for herself to the side.  She continues to feel worried about getting things done on time and organization, which ultimately leads to her feeling that she tends to procrastinate until her deadlines are approaching.  She states she did not want to have this appointment today because she did not lose weight prior to this appointment.     Diet:   She has been achieving her previous SMART goal of eating a serving of vegetables with lunch every day, but will also snack on vegetables and high-protein options such as greek yogurt and a nut mixture.  She tends to struggle after dinner with snacking.  We discussed today this could be due to her medication wearing off in the evening, which might be suppressing her appetite throughout the day, as she takes two doses of adderall, one in the morning and one around lunchtime.   She states she feels she should focus on drinking more water, as she does drink a lot of green tea.  She is also concerned about a recent increase in snacking that has come about with her feeling restless.    Weight loss/exercise:   She previously had a SMART goal of attempting to walk during lunch, but states she has not been getting outside as often recently with the weather change and recent colder weather/snow.  She is contemplating getting an under-the-desk pedal to attempt to increase her movement while sitting at her desk throughout the day.   She states she has an upcoming meeting in April and is feeling motivated to look good for this in-person meeting for work.    She has been feeling tired and feels she has decreased motivation to complete activities with her higher weight.  She previously was around 120lb prior to working from home as a result of the COVID pandemic, but states her weight has been in the 140s for a couple of years now.    She shares with me she nearly cancelled her appointment because she was  feeling self-conscious about her weight.    She mentions that she has recently been wanting to buy new clothes and lose weight, but that she doesn't like trying on clothes when she is not looking her best.      Sleep:  She is currently averaging 9-10 hours of sleep nightly, but states she tends to wake up around 3 or 4 in the morning.       Procrastination:  She feels she needs an external motivating system or deadlines to feel motivated.  She tends to not feel a sense of urgency to complete tasks until she has deadlines looming.      Review of Systems   Constitutional:  Positive for fatigue.   Psychiatric/Behavioral:  Positive for sleep disturbance. The patient is nervous/anxious.        Past Medical History:   Diagnosis Date    Abnormal mammogram     last assessed: 08/14/17    Arthritis     Attention deficit disorder (ADD)     last assessed: 11/30/17    Benign essential hypertension     last assessed: 08/04/16    Borderline anemia     last assessed: 11/30/17    Depression     last assessed: 11/30/17    Generalized anxiety disorder     High serum renin     High serum renin     last assessed: 01/07/16    Hyperaldosteronism (HCC)     last assessed: 01/07/16    Hypertension     Hypokalemia     last assessed: 01/07/16    Insomnia     Left knee pain     Major depressive disorder     Microscopic hematuria     last assessed: 08/14/17    Patellofemoral arthritis of left knee     last assessed: 05/18/15    Pes anserine bursitis     last assessed: 11/12/15    Pregnancy induced hypertension     PTSD (post-traumatic stress disorder)      Past Surgical History:   Procedure Laterality Date    DENTAL SURGERY      HERNIA REPAIR       Family History   Problem Relation Age of Onset    Diabetes Father     Cancer Paternal Grandmother         brain    No Known Problems Mother     No Known Problems Sister     No Known Problems Daughter     No Known Problems Maternal Grandmother     No Known Problems Maternal Grandfather     No Known  "Problems Paternal Grandfather     No Known Problems Daughter     No Known Problems Maternal Aunt     No Known Problems Paternal Aunt      Social History     Socioeconomic History    Marital status: /Civil Union     Spouse name: None    Number of children: None    Years of education: Bachelors degree    Highest education level: None   Occupational History    Occupation: Licensing executive   Tobacco Use    Smoking status: Never    Smokeless tobacco: Never   Vaping Use    Vaping status: Never Used   Substance and Sexual Activity    Alcohol use: Yes     Comment: Socially    Drug use: No    Sexual activity: Yes     Partners: Male     Birth control/protection: Condom Male   Other Topics Concern    None   Social History Narrative    History of bereavement    2 children     Social Determinants of Health     Financial Resource Strain: Not on file   Food Insecurity: Not on file   Transportation Needs: Not on file   Physical Activity: Not on file   Stress: Not on file   Social Connections: Not on file   Intimate Partner Violence: Not on file   Housing Stability: Not on file     Current Outpatient Medications on File Prior to Visit   Medication Sig    amphetamine-dextroamphetamine (ADDERALL) 15 MG tablet Take 1 tablet (15 mg total) by mouth 2 (two) times a day Take in AM and 4-6 hours later for second dose. Max Daily Amount: 30 mg    escitalopram (LEXAPRO) 20 mg tablet Take 1 tablet (20 mg total) by mouth daily    lisinopril (ZESTRIL) 20 mg tablet TAKE 1 TABLET BY MOUTH EVERY DAY     No Known Allergies  Immunization History   Administered Date(s) Administered    COVID-19 PFIZER VACCINE 0.3 ML IM 04/21/2021, 05/12/2021, 12/18/2021    Influenza, recombinant, quadrivalent,injectable, preservative free 11/03/2020    Tdap 10/31/2019       Objective     /70   Pulse 103   Temp 98 °F (36.7 °C)   Resp 18   Ht 5' 2\" (1.575 m)   Wt 67 kg (147 lb 9.6 oz)   SpO2 97%   BMI 27.00 kg/m²     Physical Exam  Vitals reviewed. "   Constitutional:       General: She is not in acute distress.     Appearance: Normal appearance. She is not ill-appearing, toxic-appearing or diaphoretic.   HENT:      Right Ear: External ear normal.      Left Ear: External ear normal.      Nose: Nose normal.   Eyes:      Extraocular Movements: Extraocular movements intact.      Conjunctiva/sclera: Conjunctivae normal.   Pulmonary:      Effort: Pulmonary effort is normal. No respiratory distress.   Musculoskeletal:         General: Normal range of motion.   Neurological:      Mental Status: She is alert and oriented to person, place, and time. Mental status is at baseline.   Psychiatric:         Mood and Affect: Mood normal.         Behavior: Behavior normal.       Dianelys Calvert, DO

## 2024-02-15 NOTE — PATIENT INSTRUCTIONS
WORKPLACE STRATEGIES TO INCREASE ACTIVITY THROUGHOUT THE WORKDAY:  ?Park your car farther from the workplace and walk.  ?Use standing or walking desks to reduce sitting time, which is associated with increased morbidity and mortality.  ?Try to leave your desk regularly (eg, every 30 to 60 minutes).  ?Replace e-mails or phone calls with personal visits.  ?Hold standing or walking meetings, instead of sitting.    ?Exercise at home - For some, a home exercise program is just what they need to comply with an exercise prescription. When time is short, being able to use a home exercise bicycle or treadmill without driving to the gym is a great solution. However, quality equipment can be expensive and oftentimes the initial enthusiasm for home equipment fades and exercise declines.    ?Use an internet-based fitness program ? Another approach to helping patients perform regular aerobic exercise is using a DVD or internet fitness program ([employer] Fitness Plus, [employer] on [employer]and, etc). Such programs range from yoga to aerobics to resistance exercise circuit training. However, participants should be sure the program selected is appropriate for their fitness level and goals, and is well designed with proper instruction about exercise technique.    ?Use a workday walking routine - To start the day, park your car further away from your place of work and walk 10 minutes to your worksite. At lunchtime, walk 5 minutes away from work and 5 minutes back before eating your lunch. At the end of the day, take that same 10-minute route to walk back to your car. You will now have completed your recommended daily exercise.    ?Exercise on weekends - If you simply cannot exercise during the week, do it on the weekends. Taking 75-minute walks on Saturday and on Sunday appears to provide similar health benefits to doing the same total amount of walking during the week. However, it is prudent to build up to the 75-minute walks gradually in  order to avoid overuse injuries of the lower extremities and other problems (eg, friction blisters). Perhaps start with 30-minute walks and each successive weekend add five minutes to each walk until you reach 75 minutes.    ?Increase workout intensity - You get the same benefit in half the time by performing vigorous as opposed to moderate-intensity exercise. As an example, if you jog for 25 minutes three days each week, you reap the same benefits as walking for 30 minutes five days each week.    ?Find a partner - Find someone to exercise with or join a group exercise program. This makes exercising more social and fun and increases the likelihood that you will continue. Joining a group of like-minded exercisers (such as a walking group or tennis [place]) or a gym can provide necessary encouragement and support. However, such a strategy may be more costly and limit your exercise options. Adopting a dog helps some people exercise more regularly.    ?Join a gym or work with a  ? Joining a local gym or working with a qualified  can be extremely helpful for some who are struggling to adhere to an exercise program. Several professional associations, including the ACSM, offer instruction and proficiency standards and competency certification. A knowledgeable  can help you create an exercise regimen that best fits your functional status and goals, while providing guidance on proper technique to help you avoid injury. A gym or health club can not only provide a safe and inviting place to exercise, it can also provide opportunities for socialization that make exercising more enjoyable, leading to enhanced adherence.

## 2024-03-11 ENCOUNTER — APPOINTMENT (OUTPATIENT)
Dept: LAB | Facility: AMBULARY SURGERY CENTER | Age: 54
End: 2024-03-11
Payer: COMMERCIAL

## 2024-03-11 DIAGNOSIS — Z13.220 SCREENING FOR LIPID DISORDERS: ICD-10-CM

## 2024-03-11 DIAGNOSIS — Z13.1 SCREENING FOR DIABETES MELLITUS: ICD-10-CM

## 2024-03-11 LAB
ALBUMIN SERPL BCP-MCNC: 4.3 G/DL (ref 3.5–5)
ALP SERPL-CCNC: 86 U/L (ref 34–104)
ALT SERPL W P-5'-P-CCNC: 34 U/L (ref 7–52)
ANION GAP SERPL CALCULATED.3IONS-SCNC: 9 MMOL/L
AST SERPL W P-5'-P-CCNC: 35 U/L (ref 13–39)
BILIRUB SERPL-MCNC: 0.6 MG/DL (ref 0.2–1)
BUN SERPL-MCNC: 14 MG/DL (ref 5–25)
CALCIUM SERPL-MCNC: 9.4 MG/DL (ref 8.4–10.2)
CHLORIDE SERPL-SCNC: 103 MMOL/L (ref 96–108)
CHOLEST SERPL-MCNC: 144 MG/DL
CO2 SERPL-SCNC: 28 MMOL/L (ref 21–32)
CREAT SERPL-MCNC: 0.64 MG/DL (ref 0.6–1.3)
GFR SERPL CREATININE-BSD FRML MDRD: 102 ML/MIN/1.73SQ M
GLUCOSE P FAST SERPL-MCNC: 79 MG/DL (ref 65–99)
HDLC SERPL-MCNC: 64 MG/DL
LDLC SERPL CALC-MCNC: 66 MG/DL (ref 0–100)
NONHDLC SERPL-MCNC: 80 MG/DL
POTASSIUM SERPL-SCNC: 3.6 MMOL/L (ref 3.5–5.3)
PROT SERPL-MCNC: 7.3 G/DL (ref 6.4–8.4)
SODIUM SERPL-SCNC: 140 MMOL/L (ref 135–147)
TRIGL SERPL-MCNC: 68 MG/DL

## 2024-03-11 PROCEDURE — 36415 COLL VENOUS BLD VENIPUNCTURE: CPT

## 2024-03-11 PROCEDURE — 80061 LIPID PANEL: CPT

## 2024-03-11 PROCEDURE — 80053 COMPREHEN METABOLIC PANEL: CPT

## 2024-03-14 ENCOUNTER — OFFICE VISIT (OUTPATIENT)
Dept: FAMILY MEDICINE CLINIC | Facility: OTHER | Age: 54
End: 2024-03-14
Payer: COMMERCIAL

## 2024-03-14 VITALS
TEMPERATURE: 98 F | OXYGEN SATURATION: 99 % | BODY MASS INDEX: 27.27 KG/M2 | HEART RATE: 106 BPM | DIASTOLIC BLOOD PRESSURE: 70 MMHG | RESPIRATION RATE: 18 BRPM | SYSTOLIC BLOOD PRESSURE: 128 MMHG | HEIGHT: 62 IN | WEIGHT: 148.2 LBS

## 2024-03-14 DIAGNOSIS — Z00.00 ANNUAL PHYSICAL EXAM: Primary | ICD-10-CM

## 2024-03-14 DIAGNOSIS — Z12.4 SCREENING FOR CERVICAL CANCER: ICD-10-CM

## 2024-03-14 DIAGNOSIS — I10 ESSENTIAL HYPERTENSION: ICD-10-CM

## 2024-03-14 PROCEDURE — 99396 PREV VISIT EST AGE 40-64: CPT

## 2024-03-14 RX ORDER — LISINOPRIL 10 MG/1
20 TABLET ORAL DAILY
Qty: 60 TABLET | Refills: 3 | Status: SHIPPED | OUTPATIENT
Start: 2024-03-14 | End: 2024-04-13

## 2024-03-14 NOTE — PROGRESS NOTES
ADULT ANNUAL PHYSICAL  [facility]    NAME: Diamond Carey  AGE: 53 y.o. SEX: female  : 1970     DATE: 3/14/2024     Assessment and Plan:     Problem List Items Addressed This Visit          Cardiovascular and Mediastinum    Essential hypertension    Relevant Medications    lisinopril (ZESTRIL) 10 mg tablet     Other Visit Diagnoses       Annual physical exam    -  Primary    Screening for cervical cancer        Relevant Orders    Ambulatory referral to Obstetrics / Gynecology            Immunizations and preventive care screenings were discussed with patient today. Appropriate education was printed on patient's after visit summary.    Counseling:  Alcohol/drug use: discussed moderation in alcohol intake, the recommendations for healthy alcohol use, and avoidance of illicit drug use.  Dental Health: discussed importance of regular tooth brushing, flossing, and dental visits.  Injury prevention: discussed safety/seat belts, safety helmets, smoke detectors, carbon dioxide detectors, and smoking near bedding or upholstery.  Sexual health: discussed sexually transmitted diseases, partner selection, use of condoms, avoidance of unintended pregnancy, and contraceptive alternatives.  Exercise: the importance of regular exercise/physical activity was discussed. Recommend exercise 3-5 times per week for at least 30 minutes.       Depression Screening and Follow-up Plan: Patient's depression screening was positive with a PHQ-9 score of 6. Patient with underlying depression and was advised to continue current medications as prescribed.       Nutrition Assessment and Intervention:     Reviewed and updated Nutrition Prescription    Nutrition patient handout reviewed with patient      Physical Activity Assessment and Intervention:    Reviewed and updated Physical Activity Prescription with patient      Other interventions: Per AHA guidelines, recommend  moderate-vigorous intensity exercise for 30 minutes a day for 5 days a week or a total of 150 min/week        Emotional and Mental Well-being, Sleep, Connectedness Assessment and Intervention:    Sleep/stress assessment performed    Depression and anxiety screening performed and reviewed      Tobacco and Toxic Substance Assessment and Intervention:     Tobacco use screening performed    Alcohol and drug use screening performed         The patient indicates understanding of these issues and agrees with the plan.    Return in about 4 weeks (around 2024) for Recheck BP wiht decreased med dose.     Chief Complaint:     Chief Complaint   Patient presents with    Physical Exam    Follow-up     Exercise log      History of Present Illness:     Adult Annual Physical   Patient here for a comprehensive physical exam. The patient reports no problems. Has been checking her BP and it has been 110s/70s.    Diet and Physical Activity  Diet/Nutrition: well balanced diet and consuming 3-5 servings of fruits/vegetables daily. Enjoys greek yogurt and nuts.   Exercise: walking.      Depression Screening  PHQ-2/9 Depression Screening    Little interest or pleasure in doing things: 1 - several days  Feeling down, depressed, or hopeless: 1 - several days  Trouble falling or staying asleep, or sleeping too much: 1 - several days  Feeling tired or having little energy: 1 - several days  Poor appetite or overeatin - not at all  Feeling bad about yourself - or that you are a failure or have let yourself or your family down: 0 - not at all  Trouble concentrating on things, such as reading the newspaper or watching television: 1 - several days  Moving or speaking so slowly that other people could have noticed. Or the opposite - being so fidgety or restless that you have been moving around a lot more than usual: 1 - several days  Thoughts that you would be better off dead, or of hurting yourself in some way: 0 - not at all  PHQ-9 Score:  6  PHQ-9 Interpretation: Mild depression       General Health  Sleep: sleeps well and gets 7-8 hours of sleep on average.   Hearing: normal - bilateral.  Vision: most recent eye exam >1 year ago and wears glasses.   Dental: regular dental visits and brushes teeth twice daily.       /GYN Health  Follows with gynecology? yes   Patient is: premenopausal with some irregularity with skipping months  Last menstrual period: 1/2024  Contraceptive method: barrier methods.    Advanced Care Planning  Do you have an advanced directive? no  Do you have a durable medical power of ? no  ACP document given to the patient? No declines     Review of Systems:     Review of Systems   Constitutional:  Negative for chills and fever.   HENT:  Negative for ear pain and sore throat.    Eyes:  Negative for pain and visual disturbance.   Respiratory:  Negative for cough and shortness of breath.    Cardiovascular:  Negative for chest pain and palpitations.   Gastrointestinal:  Negative for abdominal pain, blood in stool and vomiting.   Genitourinary:  Negative for dysuria and hematuria.   Musculoskeletal:  Negative for arthralgias and back pain.   Skin:  Negative for color change and rash.   Neurological:  Negative for seizures and syncope.   Psychiatric/Behavioral:  Negative for dysphoric mood, self-injury, sleep disturbance and suicidal ideas.       Past Medical History:     Past Medical History:   Diagnosis Date    Abnormal mammogram     last assessed: 08/14/17    Arthritis     Attention deficit disorder (ADD)     last assessed: 11/30/17    Benign essential hypertension     last assessed: 08/04/16    Borderline anemia     last assessed: 11/30/17    Depression     last assessed: 11/30/17    Generalized anxiety disorder     High serum renin     High serum renin     last assessed: 01/07/16    Hyperaldosteronism (HCC)     last assessed: 01/07/16    Hypertension     Hypokalemia     last assessed: 01/07/16    Insomnia     Left knee pain      Major depressive disorder     Microscopic hematuria     last assessed: 08/14/17    Patellofemoral arthritis of left knee     last assessed: 05/18/15    Pes anserine bursitis     last assessed: 11/12/15    Pregnancy induced hypertension     PTSD (post-traumatic stress disorder)       Past Surgical History:     Past Surgical History:   Procedure Laterality Date    DENTAL SURGERY      HERNIA REPAIR        Social History:     Social History     Socioeconomic History    Marital status: /Civil Union     Spouse name: None    Number of children: None    Years of education: Bachelors degree    Highest education level: None   Occupational History    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   Tobacco Use    Smoking status: Never    Smokeless tobacco: Never   Vaping Use    Vaping status: Never Used   Substance and Sexual Activity    Alcohol use: Yes     Comment: Socially    Drug use: No    Sexual activity: Yes     Partners: Male     Birth control/protection: Condom Male   Other Topics Concern    None   Social History Narrative    History of bereavement    2 children     Social Determinants of Health     Financial Resource Strain: Not on file   Food Insecurity: Not on file   Transportation Needs: Not on file   Physical Activity: Not on file   Stress: Not on file   Social Connections: Not on file   Intimate Partner Violence: Not on file   Housing Stability: Not on file      Family History:     Family History   Problem Relation Age of Onset    Diabetes Father     Cancer Paternal Grandmother         brain    No Known Problems Mother     No Known Problems Sister     No Known Problems Daughter     No Known Problems Maternal Grandmother     No Known Problems Maternal Grandfather     No Known Problems Paternal Grandfather     No Known Problems Daughter     No Known Problems Maternal Aunt     No Known Problems Paternal Aunt       Current Medications:     Current Outpatient Medications   Medication Sig Dispense Refill     "amphetamine-dextroamphetamine (ADDERALL) 15 MG tablet Take 1 tablet (15 mg total) by mouth 2 (two) times a day Take in AM and 4-6 hours later for second dose. Max Daily Amount: 30 mg 60 tablet 0    escitalopram (LEXAPRO) 20 mg tablet Take 1 tablet (20 mg total) by mouth daily 90 tablet 1    lisinopril (ZESTRIL) 10 mg tablet Take 2 tablets (20 mg total) by mouth daily 60 tablet 3     No current facility-administered medications for this visit.      Allergies:     No Known Allergies   Physical Exam:     /70   Pulse (!) 106   Temp 98 °F (36.7 °C)   Resp 18   Ht 5' 2\" (1.575 m)   Wt 67.2 kg (148 lb 3.2 oz)   SpO2 99%   BMI 27.11 kg/m²     Physical Exam  Vitals and nursing note reviewed.   Constitutional:       General: She is not in acute distress.     Appearance: She is well-developed.      Comments: Body mass index is 27.11 kg/m².     HENT:      Head: Normocephalic and atraumatic.      Right Ear: External ear normal.      Left Ear: External ear normal.      Nose: Nose normal.      Mouth/Throat:      Mouth: Mucous membranes are moist.      Pharynx: Oropharynx is clear. No oropharyngeal exudate.   Eyes:      Extraocular Movements: Extraocular movements intact.      Conjunctiva/sclera: Conjunctivae normal.      Pupils: Pupils are equal, round, and reactive to light.   Cardiovascular:      Rate and Rhythm: Normal rate and regular rhythm.      Pulses: Normal pulses.      Heart sounds: Normal heart sounds. No murmur heard.  Pulmonary:      Effort: Pulmonary effort is normal. No respiratory distress.      Breath sounds: Normal breath sounds.   Abdominal:      General: Bowel sounds are normal.      Palpations: Abdomen is soft.      Tenderness: There is no abdominal tenderness. There is no guarding or rebound.   Musculoskeletal:         General: No swelling.      Cervical back: Neck supple.   Lymphadenopathy:      Cervical: No cervical adenopathy.   Skin:     General: Skin is warm and dry.      Capillary Refill: " Capillary refill takes less than 2 seconds.   Neurological:      Mental Status: She is alert and oriented to person, place, and time.      Cranial Nerves: No cranial nerve deficit.   Psychiatric:         Mood and Affect: Mood normal.         Behavior: Behavior normal.          Verónica Adams DO  [facility]

## 2024-03-15 DIAGNOSIS — F98.8 ATTENTION DEFICIT DISORDER, UNSPECIFIED HYPERACTIVITY PRESENCE: ICD-10-CM

## 2024-03-15 RX ORDER — DEXTROAMPHETAMINE SACCHARATE, AMPHETAMINE ASPARTATE, DEXTROAMPHETAMINE SULFATE AND AMPHETAMINE SULFATE 3.75; 3.75; 3.75; 3.75 MG/1; MG/1; MG/1; MG/1
15 TABLET ORAL 2 TIMES DAILY
Qty: 60 TABLET | Refills: 0 | Status: SHIPPED | OUTPATIENT
Start: 2024-03-15

## 2024-03-15 NOTE — TELEPHONE ENCOUNTER
Pt called and lm to get refill on her adderall to the [facility] in [place]

## 2024-03-29 NOTE — TELEPHONE ENCOUNTER
Chief Complaint   Patient presents with    Forms     Has unum intermittent absences paperwork that needs to be completed today, has been missing days due to overall body pain but mainly back pain      Pre-visit Screening:  Immunizations:  up to date  Colonoscopy:  na  Mammogram: na  Asthma Action Test/Plan:  na  PHQ9:  na  GAD7:  na  Questioned patient about current smoking habits Pt. quit smoking some time ago.  Ok to leave detailed message on voice mail for today's visit only yes, phone # [phone] (home)        PDMP checked   Last filled 12/18/2019

## 2024-04-12 DIAGNOSIS — F32.A DEPRESSION, UNSPECIFIED DEPRESSION TYPE: ICD-10-CM

## 2024-04-12 RX ORDER — ESCITALOPRAM OXALATE 20 MG/1
20 TABLET ORAL DAILY
Qty: 90 TABLET | Refills: 1 | Status: SHIPPED | OUTPATIENT
Start: 2024-04-12

## 2024-04-12 NOTE — TELEPHONE ENCOUNTER
Hi, this is Diamond Esparza's last name is spelled FORISTER. My YOB: 1970. I'm calling to refill. I have a three month supply of Lexapro 20 Mg. It just ran out. I have this particular prescription at [facility] at [address] [place] [address]. In [place]. The phone number is [phone] and my phone number, that's my cell number, is the best, [phone] and I think that's it. Thank you and have a great weekend.

## 2024-04-15 DIAGNOSIS — F98.8 ATTENTION DEFICIT DISORDER, UNSPECIFIED HYPERACTIVITY PRESENCE: ICD-10-CM

## 2024-04-15 RX ORDER — DEXTROAMPHETAMINE SACCHARATE, AMPHETAMINE ASPARTATE, DEXTROAMPHETAMINE SULFATE AND AMPHETAMINE SULFATE 3.75; 3.75; 3.75; 3.75 MG/1; MG/1; MG/1; MG/1
15 TABLET ORAL 2 TIMES DAILY
Qty: 60 TABLET | Refills: 0 | Status: SHIPPED | OUTPATIENT
Start: 2024-04-15

## 2024-04-15 NOTE — TELEPHONE ENCOUNTER
Hello, good morning. My name is Diamond Esparza, last name is spelled FORISTER. My YOB: 1970 and my phone number, my mobile number specifically is [phone]. I'm calling to get a refill prescription for my monthly medication. This one is the Adderall 15 Joahnna twice a day so the quantity is 60. The prescription, I have it with [facility], [place] and their number is [phone]. We think that's it. Anything else just let me know and thank you so much. Alize

## 2024-04-23 ENCOUNTER — OFFICE VISIT (OUTPATIENT)
Age: 54
End: 2024-04-23
Payer: COMMERCIAL

## 2024-04-23 ENCOUNTER — OFFICE VISIT (OUTPATIENT)
Dept: OBGYN CLINIC | Facility: CLINIC | Age: 54
End: 2024-04-23
Payer: COMMERCIAL

## 2024-04-23 VITALS
TEMPERATURE: 98 F | RESPIRATION RATE: 18 BRPM | WEIGHT: 150.4 LBS | DIASTOLIC BLOOD PRESSURE: 80 MMHG | HEART RATE: 105 BPM | OXYGEN SATURATION: 97 % | HEIGHT: 62 IN | BODY MASS INDEX: 27.68 KG/M2 | SYSTOLIC BLOOD PRESSURE: 124 MMHG

## 2024-04-23 VITALS
DIASTOLIC BLOOD PRESSURE: 76 MMHG | HEIGHT: 62 IN | BODY MASS INDEX: 27.79 KG/M2 | WEIGHT: 151 LBS | SYSTOLIC BLOOD PRESSURE: 120 MMHG

## 2024-04-23 DIAGNOSIS — Z12.4 SCREENING FOR CERVICAL CANCER: Primary | ICD-10-CM

## 2024-04-23 DIAGNOSIS — Z12.31 ENCOUNTER FOR SCREENING MAMMOGRAM FOR MALIGNANT NEOPLASM OF BREAST: ICD-10-CM

## 2024-04-23 DIAGNOSIS — I10 ESSENTIAL HYPERTENSION: ICD-10-CM

## 2024-04-23 PROCEDURE — 99213 OFFICE O/P EST LOW 20 MIN: CPT

## 2024-04-23 PROCEDURE — 99386 PREV VISIT NEW AGE 40-64: CPT | Performed by: PHYSICIAN ASSISTANT

## 2024-04-23 RX ORDER — LISINOPRIL 10 MG/1
10 TABLET ORAL DAILY
Start: 2024-04-23 | End: 2024-05-23

## 2024-04-23 NOTE — PROGRESS NOTES
Assessment/Plan   Problem List Items Addressed This Visit    None  Visit Diagnoses       Screening for cervical cancer    -  Primary    Relevant Orders    Thinprep Tis Pap and HPV mRNA E6/E7 Reflex HPV 16,18/45    Encounter for screening mammogram for malignant neoplasm of breast        Relevant Orders    Mammo screening bilateral w 3d & cad            Discussion    All questions have been answered to her satisfaction  RTO for APE or sooner if needed  Pap done today.   Mammo ordered.   Will watch cycles and call with concerning menopausal complaints.       Subjective     HPI   Diamond Carey is a 53 y.o. female who presents for annual well woman exam.     LMP - 1/1/24  ; Periods had been coming monthly until this last fall she started skipping a month. Has not had period since January. Denies significant VMS.     No vulvar itch/burn; No vaginal itch/burn; No abn discharge or odor; No urinary sx - burning/pain/frequency/hematuria    No concerning breast masses, asymmetry, nipple discharge or bleeding, changes in skin of breast, or breast tenderness bilaterally    No abd/pelvic pain or HAs;     No problematic menopausal symptoms.    Pt is not often sexually active in a mutually monog/ sexual relationship; No issues with intercourse; She declines sti/hiv/hep testing; Feels safe at home  Current contraception: none     (+) PCP for routine Bw/care;    Last Pap : 6/13/2018  WNL neg HPV   History of abnormal Pap smear: denies   Mammo:3/2/23 BIRADs 1   Abnormal mammo: denies biopsy   Colonoscopy:5/2/22 f/u 7 years     Review of Systems   Constitutional: Negative.    Respiratory: Negative.     Gastrointestinal: Negative.    Endocrine: Negative.    Genitourinary: Negative.        The following portions of the patient's history were reviewed and updated as appropriate: allergies, current medications, past family history, past medical history, past social history, past surgical history, and problem list.         OB  History          4    Para   2    Term   0       2    AB   2    Living   2         SAB   1    IAB        Ectopic        Multiple        Live Births                     Past Medical History:   Diagnosis Date    Abnormal mammogram     last assessed: 17    Anemia     Arthritis     Attention deficit disorder (ADD)     last assessed: 17    Benign essential hypertension     last assessed: 16    Borderline anemia     last assessed: 17    Depression     last assessed: 17    Ectopic pregnancy Summer 2003    Generalized anxiety disorder     High serum renin     High serum renin     last assessed: 16    Hyperaldosteronism (HCC)     last assessed: 16    Hypertension     Hypokalemia     last assessed: 16    Insomnia     Left knee pain     Major depressive disorder     Microscopic hematuria     last assessed: 17    Miscarriage     Unsure about date    Patellofemoral arthritis of left knee     last assessed: 05/18/15    Pes anserine bursitis     last assessed: 11/12/15    Pregnancy induced hypertension     PTSD (post-traumatic stress disorder)     Varicella     In first grade       Past Surgical History:   Procedure Laterality Date    DENTAL SURGERY      HERNIA REPAIR         Family History   Problem Relation Age of Onset    Diabetes Father     Cancer Father         Stroke 2 years ago    Cancer Paternal Grandmother         Brain cancer    No Known Problems Mother     No Known Problems Sister     No Known Problems Daughter     No Known Problems Maternal Grandmother     No Known Problems Maternal Grandfather     No Known Problems Paternal Grandfather     No Known Problems Daughter     No Known Problems Maternal Aunt     No Known Problems Paternal Aunt        Social History     Socioeconomic History    Marital status: /Civil Union     Spouse name: Not on file    Number of children: Not on file    Years of education: Bachelors degree    Highest education  "level: Not on file   Occupational History    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   Tobacco Use    Smoking status: Never    Smokeless tobacco: Never   Vaping Use    Vaping status: Never Used   Substance and Sexual Activity    Alcohol use: Not Currently     Comment: Socially    Drug use: No    Sexual activity: Not Currently     Partners: Male     Birth control/protection: Abstinence, Condom Male   Other Topics Concern    Not on file   Social History Narrative    History of bereavement    2 children     Social Determinants of Health     Financial Resource Strain: Not on file   Food Insecurity: Not on file   Transportation Needs: Not on file   Physical Activity: Not on file   Stress: Not on file   Social Connections: Not on file   Intimate Partner Violence: Not on file   Housing Stability: Not on file         Current Outpatient Medications:     amphetamine-dextroamphetamine (ADDERALL) 15 MG tablet, Take 1 tablet (15 mg total) by mouth 2 (two) times a day Take in AM and 4-6 hours later for second dose. Max Daily Amount: 30 mg, Disp: 60 tablet, Rfl: 0    escitalopram (LEXAPRO) 20 mg tablet, Take 1 tablet (20 mg total) by mouth daily, Disp: 90 tablet, Rfl: 1    lisinopril (ZESTRIL) 10 mg tablet, Take 2 tablets (20 mg total) by mouth daily, Disp: 60 tablet, Rfl: 3    No Known Allergies    Objective   Vitals:    04/23/24 0726   BP: 120/76   BP Location: Left arm   Patient Position: Sitting   Cuff Size: Standard   Weight: 68.5 kg (151 lb)   Height: 5' 2\" (1.575 m)     Physical Exam  Vitals reviewed.   HENT:      Head: Normocephalic and atraumatic.   Cardiovascular:      Rate and Rhythm: Normal rate and regular rhythm.   Pulmonary:      Effort: Pulmonary effort is normal.      Breath sounds: Normal breath sounds.   Chest:   Breasts:     Breasts are symmetrical.      Right: No swelling, bleeding, inverted nipple, mass, nipple discharge, skin change or tenderness.      Left: No swelling, bleeding, inverted nipple, mass, nipple " discharge, skin change or tenderness.   Abdominal:      General: Abdomen is flat. Bowel sounds are normal.      Palpations: Abdomen is soft.      Tenderness: There is no abdominal tenderness. There is no right CVA tenderness, left CVA tenderness or guarding.   Genitourinary:     General: Normal vulva.      Pubic Area: No rash.       Labia:         Right: No rash, tenderness, lesion or injury.         Left: No rash, tenderness, lesion or injury.       Urethra: No prolapse, urethral pain, urethral swelling or urethral lesion.      Vagina: Normal. No signs of injury and foreign body. No vaginal discharge or erythema.      Cervix: Normal.      Uterus: Normal.       Adnexa: Right adnexa normal and left adnexa normal.   Musculoskeletal:      Cervical back: Neck supple.   Lymphadenopathy:      Upper Body:      Right upper body: No axillary adenopathy.      Left upper body: No axillary adenopathy.   Skin:     General: Skin is warm and dry.   Neurological:      Mental Status: She is alert and oriented to person, place, and time.   Psychiatric:         Mood and Affect: Mood normal.         Behavior: Behavior normal.         Thought Content: Thought content normal.         Judgment: Judgment normal.         There are no Patient Instructions on file for this visit.

## 2024-04-23 NOTE — PROGRESS NOTES
Name: Diamond Carey      : 1970      MRN: [id number]  Encounter Provider: Verónica Adams DO  Encounter Date: 2024   Encounter department: [facility]    Assessment & Plan     1. Essential hypertension  -     lisinopril (ZESTRIL) 10 mg tablet; Take 1 tablet (10 mg total) by mouth daily    Pt's BP has been stable at home and in office today with decreased lisinopril 10mg. Tolerating well without significant side effects. Will continue lisinopril 10mg daily. Also reviewed DASH diet with patient. Continue monitoring at home BP a few times weekly. If BP persistently elevated >140/90 call office.     Nutrition Assessment and Intervention:     Reviewed and updated Nutrition Prescription    Nutrition patient handout reviewed with patient      Tobacco and Toxic Substance Assessment and Intervention:     Tobacco use screening performed    Alcohol and drug use screening performed           The patient indicates understanding of these issues and agrees with the plan.    Return in about 3 months (around 2024) for Recheck HTN and ADHD.      Subjective      Diamond is a 54 yo female who presents Diamond Carey  is a 53 y.o.  who presents for f/u of HTN. She states that at home BP range is 120s/70s and BP is taken daily. Currently they take 10mg lisinopril daily to treat HTN and are compliant with their medication. When BP is high they state they do not have headache pain or sudden visual changes. Aggravating factors are: none known. Relieving factors are: taking medication, eating well and decreased stress levels. Associated symptoms are: none. Reports she was recently out of town for trip and at more liberally than she would at home. Is now back to her usual eating patterns.         Review of Systems   Constitutional:  Negative for chills and fever.   HENT:  Negative for ear pain and sore throat.    Eyes:  Negative for pain and visual disturbance.   Respiratory:  Negative for  "cough and shortness of breath.    Cardiovascular:  Negative for chest pain.   Gastrointestinal:  Negative for abdominal pain and vomiting.   Genitourinary:  Negative for dysuria and hematuria.   Musculoskeletal:  Negative for arthralgias and back pain.   Skin:  Negative for color change and rash.   Neurological:  Negative for syncope and headaches.       Current Outpatient Medications on File Prior to Visit   Medication Sig    amphetamine-dextroamphetamine (ADDERALL) 15 MG tablet Take 1 tablet (15 mg total) by mouth 2 (two) times a day Take in AM and 4-6 hours later for second dose. Max Daily Amount: 30 mg    escitalopram (LEXAPRO) 20 mg tablet Take 1 tablet (20 mg total) by mouth daily    [DISCONTINUED] lisinopril (ZESTRIL) 10 mg tablet Take 2 tablets (20 mg total) by mouth daily       Objective     /80   Pulse 105   Temp 98 °F (36.7 °C)   Resp 18   Ht 5' 2\" (1.575 m)   Wt 68.2 kg (150 lb 6.4 oz)   LMP 01/01/2024 (Exact Date)   SpO2 97%   BMI 27.51 kg/m²     Physical Exam  Vitals reviewed.   Constitutional:       General: She is not in acute distress.     Comments: Body mass index is 27.51 kg/m².     HENT:      Head: Normocephalic and atraumatic.      Right Ear: External ear normal.      Left Ear: External ear normal.      Nose: No congestion.      Mouth/Throat:      Mouth: Mucous membranes are moist.      Pharynx: Oropharynx is clear. No posterior oropharyngeal erythema.   Eyes:      General: No scleral icterus.     Pupils: Pupils are equal, round, and reactive to light.   Cardiovascular:      Rate and Rhythm: Normal rate and regular rhythm.      Pulses: Normal pulses.      Heart sounds: Normal heart sounds. No murmur heard.  Pulmonary:      Effort: Pulmonary effort is normal. No respiratory distress.      Breath sounds: No rhonchi or rales.   Abdominal:      General: Bowel sounds are normal.      Palpations: Abdomen is soft.      Tenderness: There is no abdominal tenderness.   Musculoskeletal:      " Right lower leg: No edema.      Left lower leg: No edema.   Skin:     General: Skin is warm and dry.      Capillary Refill: Capillary refill takes less than 2 seconds.   Neurological:      Mental Status: She is alert and oriented to person, place, and time.      Cranial Nerves: No cranial nerve deficit.   Psychiatric:         Mood and Affect: Mood normal.         Behavior: Behavior normal.       Verónica Adams,

## 2024-04-26 LAB
CLINICAL INFO: NORMAL
CYTO CVX: NORMAL
CYTOLOGY CMNT CVX/VAG CYTO-IMP: NORMAL
DATE PREVIOUS BX: NORMAL
HPV E6+E7 MRNA CVX QL NAA+PROBE: NOT DETECTED
LMP START DATE: NORMAL
SL AMB PREV. PAP:: NORMAL
SPECIMEN SOURCE CVX/VAG CYTO: NORMAL

## 2024-05-14 DIAGNOSIS — F98.8 ATTENTION DEFICIT DISORDER, UNSPECIFIED HYPERACTIVITY PRESENCE: ICD-10-CM

## 2024-05-15 DIAGNOSIS — F98.8 ATTENTION DEFICIT DISORDER, UNSPECIFIED HYPERACTIVITY PRESENCE: ICD-10-CM

## 2024-05-15 RX ORDER — DEXTROAMPHETAMINE SACCHARATE, AMPHETAMINE ASPARTATE, DEXTROAMPHETAMINE SULFATE AND AMPHETAMINE SULFATE 3.75; 3.75; 3.75; 3.75 MG/1; MG/1; MG/1; MG/1
15 TABLET ORAL 2 TIMES DAILY
Qty: 60 TABLET | Refills: 0 | OUTPATIENT
Start: 2024-05-15

## 2024-05-15 RX ORDER — DEXTROAMPHETAMINE SACCHARATE, AMPHETAMINE ASPARTATE, DEXTROAMPHETAMINE SULFATE AND AMPHETAMINE SULFATE 3.75; 3.75; 3.75; 3.75 MG/1; MG/1; MG/1; MG/1
15 TABLET ORAL 2 TIMES DAILY
Qty: 60 TABLET | Refills: 0 | Status: SHIPPED | OUTPATIENT
Start: 2024-05-15

## 2024-05-15 NOTE — TELEPHONE ENCOUNTER
Patient contacted Call Center requested refill of their medication.  She only has 1 day left of the medication and is asking if this can be refilled for her as soon as possible.

## 2024-06-13 DIAGNOSIS — F98.8 ATTENTION DEFICIT DISORDER, UNSPECIFIED HYPERACTIVITY PRESENCE: ICD-10-CM

## 2024-06-13 RX ORDER — DEXTROAMPHETAMINE SACCHARATE, AMPHETAMINE ASPARTATE, DEXTROAMPHETAMINE SULFATE AND AMPHETAMINE SULFATE 3.75; 3.75; 3.75; 3.75 MG/1; MG/1; MG/1; MG/1
15 TABLET ORAL 2 TIMES DAILY
Qty: 60 TABLET | Refills: 0 | Status: SHIPPED | OUTPATIENT
Start: 2024-06-13

## 2024-06-13 NOTE — TELEPHONE ENCOUNTER
Reason for call:   [x] Refill   [] Prior Auth  [] Other:     Office:   [x] PCP/Provider -Jamil QUESADA   [] Specialty/Provider -     Medication: Adderall     Dose/Frequency: 15 mg tablet    Quantity: #30    Pharmacy: [facility]    Does the patient have enough for 3 days?   [] Yes   [x] No - Send as HP to POD

## 2024-07-12 DIAGNOSIS — F98.8 ATTENTION DEFICIT DISORDER, UNSPECIFIED HYPERACTIVITY PRESENCE: ICD-10-CM

## 2024-07-15 RX ORDER — DEXTROAMPHETAMINE SACCHARATE, AMPHETAMINE ASPARTATE, DEXTROAMPHETAMINE SULFATE AND AMPHETAMINE SULFATE 3.75; 3.75; 3.75; 3.75 MG/1; MG/1; MG/1; MG/1
15 TABLET ORAL 2 TIMES DAILY
Qty: 60 TABLET | Refills: 0 | Status: SHIPPED | OUTPATIENT
Start: 2024-07-15

## 2024-07-15 NOTE — TELEPHONE ENCOUNTER
Pt calling in checking on status, looks like it was sent to prior auth by mistake, rerouting and sending

## 2024-08-12 ENCOUNTER — OFFICE VISIT (OUTPATIENT)
Age: 54
End: 2024-08-12
Payer: COMMERCIAL

## 2024-08-12 VITALS
HEIGHT: 62 IN | OXYGEN SATURATION: 99 % | TEMPERATURE: 98 F | HEART RATE: 87 BPM | SYSTOLIC BLOOD PRESSURE: 122 MMHG | BODY MASS INDEX: 27.64 KG/M2 | DIASTOLIC BLOOD PRESSURE: 78 MMHG | RESPIRATION RATE: 16 BRPM | WEIGHT: 150.2 LBS

## 2024-08-12 DIAGNOSIS — F98.8 ATTENTION DEFICIT DISORDER, UNSPECIFIED HYPERACTIVITY PRESENCE: ICD-10-CM

## 2024-08-12 DIAGNOSIS — F90.0 ATTENTION DEFICIT HYPERACTIVITY DISORDER (ADHD), PREDOMINANTLY INATTENTIVE TYPE: ICD-10-CM

## 2024-08-12 DIAGNOSIS — I10 ESSENTIAL HYPERTENSION: Primary | ICD-10-CM

## 2024-08-12 PROCEDURE — 99213 OFFICE O/P EST LOW 20 MIN: CPT

## 2024-08-12 RX ORDER — DEXTROAMPHETAMINE SACCHARATE, AMPHETAMINE ASPARTATE, DEXTROAMPHETAMINE SULFATE AND AMPHETAMINE SULFATE 3.75; 3.75; 3.75; 3.75 MG/1; MG/1; MG/1; MG/1
15 TABLET ORAL 2 TIMES DAILY
Qty: 60 TABLET | Refills: 0 | Status: SHIPPED | OUTPATIENT
Start: 2024-08-12

## 2024-08-12 NOTE — ASSESSMENT & PLAN NOTE
Not exercising currently  Eating some junk food in the evenings  PLAN:   -Counseled on starting slow on increasing exercise   -10 minutes 3x weekly   -walk with  to keep accountability

## 2024-08-12 NOTE — ASSESSMENT & PLAN NOTE
Currently on lisinopril 10mg daily  BP at home 120s/70-80s   PLAN:  -continue lisinopril 10mg daily  -continue to check blood pressure at least 1-2 hours after taking lisinopril

## 2024-08-12 NOTE — PROGRESS NOTES
Ambulatory Visit  Name: Diamond Carey      : 1970      MRN: [id number]  Encounter Provider: Марина Moscoso DO  Encounter Date: 2024   Encounter department: [facility]    Assessment & Plan   1. Essential hypertension  Assessment & Plan:  Currently on lisinopril 10mg daily  BP at home 120s/70-80s   PLAN:  -continue lisinopril 10mg daily  -continue to check blood pressure at least 1-2 hours after taking lisinopril    2. Attention deficit hyperactivity disorder (ADHD), predominantly inattentive type  Comments:  task management and focus controlled on 15mg adderall twice daily  PLAN:   -continue adderall 15mg BID  3. BMI 27.0-27.9,adult  Assessment & Plan:  Not exercising currently  Eating some junk food in the evenings  PLAN:   -Counseled on starting slow on increasing exercise   -10 minutes 3x weekly   -walk with  to keep accountability    Return in 3 months for recheck HTN. May consider reduction/stopping BP med at that time if indicated.     Nutrition Assessment and Intervention:     Reviewed and updated Nutrition Prescription      Physical Activity Assessment and Intervention:    Physical Activity Prescription completed with patient          History of Present Illness   {Disappearing Hyperlinks I Encounters * My Last Note * Since Last Visit * History :41434}  Ms. Carey is a 53F with a PMHx of MDD, HTN and ADHD who presents today for follow up of hypertension and ADHD. She checks her blood pressure every few days at home, around the same time she takes her lisinopril. At home: 120s/70s-80s, systolic occasionally 130s.   Ms. Carey says that her periods have become more irregular over the past 8-9 months, and she has become more tired and unfocused over this time period. She also mentions she thinks she has gained some weight due to changes in her period, in addition to feeling less motivated to exercise and decrease her junk food intake. She says her main  issue is snacking after dinner, mostly with chips and ice cream. Ms. 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   Ms. Carey says that she takes adderall twice a day every day and it helps her with task management and focusing. She says without it she struggled with daily tasks and got overwhelmed very easily.       Hypertension  This is a chronic problem. The current episode started more than 1 year ago. The problem has been gradually improving since onset. The problem is controlled. Pertinent negatives include no blurred vision, chest pain, headaches, palpitations, peripheral edema or shortness of breath. Agents associated with hypertension include amphetamines. Risk factors for coronary artery disease include sedentary lifestyle and stress. The current treatment provides moderate improvement.       Review of Systems   Eyes:  Negative for blurred vision.   Respiratory:  Negative for shortness of breath.    Cardiovascular:  Negative for chest pain and palpitations.   Genitourinary:  Positive for menstrual problem.   Neurological:  Negative for dizziness, syncope, light-headedness and headaches.     Current Outpatient Medications on File Prior to Visit   Medication Sig Dispense Refill   • amphetamine-dextroamphetamine (ADDERALL) 15 MG tablet Take 1 tablet (15 mg total) by mouth 2 (two) times a day Take in AM and 4-6 hours later for second dose. Max Daily Amount: 30 mg 60 tablet 0   • escitalopram (LEXAPRO) 20 mg tablet Take 1 tablet (20 mg total) by mouth daily 90 tablet 1   • lisinopril (ZESTRIL) 10 mg tablet Take 1 tablet (10 mg total) by mouth daily       No current facility-administered medications on file prior to visit.      Social History     Tobacco Use   • Smoking status: Never   • Smokeless tobacco: Never   Vaping Use   • Vaping status: Never Used   Substance and Sexual Activity   • Alcohol use: Not Currently  "    Comment: Socially   • Drug use: No   • Sexual activity: Not Currently     Partners: Male     Birth control/protection: Abstinence, Condom Male     Objective   {Disappearing Hyperlinks   Review Vitals * Enter New Vitals * Results Review * Labs * Imaging * Cardiology * Procedures * Lung Cancer Screening :05436}  /78   Pulse 87   Temp 98 °F (36.7 °C)   Resp 16   Ht 5' 2\" (1.575 m)   Wt 68.1 kg (150 lb 3.2 oz)   SpO2 99%   BMI 27.47 kg/m²     Physical Exam  Vitals and nursing note reviewed.   Constitutional:       General: She is not in acute distress.     Appearance: She is well-developed. She is not ill-appearing or toxic-appearing.   HENT:      Head: Normocephalic and atraumatic.      Mouth/Throat:      Mouth: Mucous membranes are moist.   Eyes:      Extraocular Movements: Extraocular movements intact.      Conjunctiva/sclera: Conjunctivae normal.      Pupils: Pupils are equal, round, and reactive to light.   Cardiovascular:      Rate and Rhythm: Normal rate and regular rhythm.      Heart sounds: No murmur heard.     No friction rub. No gallop.   Pulmonary:      Effort: Pulmonary effort is normal. No respiratory distress.      Breath sounds: Normal breath sounds.   Abdominal:      Palpations: Abdomen is soft.      Tenderness: There is no abdominal tenderness.   Musculoskeletal:         General: No swelling.      Cervical back: Neck supple.   Skin:     General: Skin is warm and dry.      Capillary Refill: Capillary refill takes less than 2 seconds.   Neurological:      Mental Status: She is alert.   Psychiatric:         Mood and Affect: Mood normal.         Behavior: Behavior normal.       Administrative Statements {Disappearing Hyperlinks I  Level of Service * [id number]}        "

## 2024-09-11 DIAGNOSIS — F98.8 ATTENTION DEFICIT DISORDER, UNSPECIFIED HYPERACTIVITY PRESENCE: ICD-10-CM

## 2024-09-11 RX ORDER — DEXTROAMPHETAMINE SACCHARATE, AMPHETAMINE ASPARTATE, DEXTROAMPHETAMINE SULFATE AND AMPHETAMINE SULFATE 3.75; 3.75; 3.75; 3.75 MG/1; MG/1; MG/1; MG/1
15 TABLET ORAL 2 TIMES DAILY
Qty: 60 TABLET | Refills: 0 | Status: SHIPPED | OUTPATIENT
Start: 2024-09-11

## 2024-09-11 NOTE — TELEPHONE ENCOUNTER
Reason for call:   [x] Refill   [] Prior Auth  [] Other:     Office:   [] PCP/Provider -   [] Specialty/Provider -     Medication: amphetamine-dextroamphetamine (ADDERALL) 15 MG tablet     Dose/Frequency:  Take 1 tablet (15 mg total) by mouth 2 (two) times a day Take in AM and 4-6 hours later for second dose     Quantity: 60    Pharmacy: [facility] [address] [place] [address]     Does the patient have enough for 3 days?   [] Yes   [x] No - Send as HP to POD

## 2024-10-09 DIAGNOSIS — F32.A DEPRESSION, UNSPECIFIED DEPRESSION TYPE: ICD-10-CM

## 2024-10-09 DIAGNOSIS — F90.0 ATTENTION DEFICIT HYPERACTIVITY DISORDER (ADHD), PREDOMINANTLY INATTENTIVE TYPE: ICD-10-CM

## 2024-10-09 RX ORDER — ESCITALOPRAM OXALATE 20 MG/1
20 TABLET ORAL DAILY
Qty: 90 TABLET | Refills: 1 | Status: SHIPPED | OUTPATIENT
Start: 2024-10-09

## 2024-10-11 RX ORDER — DEXTROAMPHETAMINE SACCHARATE, AMPHETAMINE ASPARTATE, DEXTROAMPHETAMINE SULFATE AND AMPHETAMINE SULFATE 3.75; 3.75; 3.75; 3.75 MG/1; MG/1; MG/1; MG/1
15 TABLET ORAL 2 TIMES DAILY
Qty: 60 TABLET | Refills: 0 | Status: SHIPPED | OUTPATIENT
Start: 2024-10-11

## 2024-10-11 NOTE — TELEPHONE ENCOUNTER
PDMP consulted and appropriate -- no red flags.   
Reason for call:   [x] Refill   [] Prior Auth  [] Other:     Office:   [x] PCP/Provider -  Elza Bentley, DO   [] Specialty/Provider -     Medication:    amphetamine-dextroamphetamine (ADDERALL) 15 MG tablet Take 1 tablet (15 mg total) by mouth 2 (two) times a day Take in AM and 4-6 hours later for second dose. Max Daily Amount: 30 mg   60      escitalopram (LEXAPRO) 20 mg tablet Take 1 tablet (20 mg total) by mouth daily   90          Pharmacy: [address] [phone]     Does the patient have enough for 3 days?   [] Yes   [x] No - Send as HP to POD    
15

## 2024-11-08 DIAGNOSIS — F90.0 ATTENTION DEFICIT HYPERACTIVITY DISORDER (ADHD), PREDOMINANTLY INATTENTIVE TYPE: ICD-10-CM

## 2024-11-08 NOTE — TELEPHONE ENCOUNTER
Reason for call:   [x] Refill   [] Prior Auth  [] Other:     Office:   [x] PCP/Provider - Sheree  [] Specialty/Provider -     Medication: Adderall 15mg    Dose/Frequency: 1 tab bid    Quantity: 60    Pharmacy: [facility] [address] [place] [address] [phone]     Does the patient have enough for 3 days?   [] Yes   [x] No - Send as HP to POD

## 2024-11-11 RX ORDER — DEXTROAMPHETAMINE SACCHARATE, AMPHETAMINE ASPARTATE, DEXTROAMPHETAMINE SULFATE AND AMPHETAMINE SULFATE 3.75; 3.75; 3.75; 3.75 MG/1; MG/1; MG/1; MG/1
15 TABLET ORAL 2 TIMES DAILY
Qty: 60 TABLET | Refills: 0 | Status: SHIPPED | OUTPATIENT
Start: 2024-11-11

## 2024-11-12 ENCOUNTER — OFFICE VISIT (OUTPATIENT)
Age: 54
End: 2024-11-12
Payer: COMMERCIAL

## 2024-11-12 VITALS
SYSTOLIC BLOOD PRESSURE: 130 MMHG | HEIGHT: 62 IN | BODY MASS INDEX: 27.9 KG/M2 | WEIGHT: 151.6 LBS | TEMPERATURE: 98.4 F | OXYGEN SATURATION: 99 % | HEART RATE: 94 BPM | DIASTOLIC BLOOD PRESSURE: 78 MMHG

## 2024-11-12 DIAGNOSIS — Z78.0 MENOPAUSE: ICD-10-CM

## 2024-11-12 DIAGNOSIS — F32.0 CURRENT MILD EPISODE OF MAJOR DEPRESSIVE DISORDER WITHOUT PRIOR EPISODE (HCC): ICD-10-CM

## 2024-11-12 DIAGNOSIS — I10 ESSENTIAL HYPERTENSION: Primary | ICD-10-CM

## 2024-11-12 PROCEDURE — 99213 OFFICE O/P EST LOW 20 MIN: CPT

## 2024-11-12 NOTE — PROGRESS NOTES
Ambulatory Visit  Name: Diamond Carey      : 1970      MRN: [id number]  Encounter Provider: Марина Moscoso DO  Encounter Date: 2024   Encounter department: [facility]    Assessment & Plan  Essential hypertension  Her blood pressure in the office today was 130/78 since decreasing her Lisinopril dose from 20 mg to 10 mg. She notes dry cough a couple of times daily associated with the ACEi, but it does not bother her much and she is not interested in switching to a different medication.   - Patient interested in possibly stopping her BP medication. Advised to keep BP log for next two weeks while continuing to take BP med. Return to office in two weeks to review BP log. May consider taking her off medication at that time and keeping another BP log for additional 2 weeks to assess Bps on and off medication.     Plan:  -Continue Lisinopril 10 mg PO daily       Menopause  She notes in the past year she has been having irregular menstrual cycles, one every 2-3 months. She denies any hot flashes or increased difficulty sleeping. She does note decreased concentration over last year associated with her irregular menstrual cycles.  - Advised patient to add strength training exercise to help prevent bone loss from decreased estrogen given menopause. Discussed vitamin D and calcium in diet and supplementation if needed.        Current mild episode of major depressive disorder without prior episode (HCC)  Discussed depression and possibility to increase current medication or referral to therapy. Patient would like to stay her current dose of lexapro 20 mg daily at this time. Patient tried therapy in the past and does not wish for a behavioral health referral at this time but will think about it and let me know if she would like to pursue this.   - Provided brief counseling and made stress management plan  - Referred patient to use [employer] for stress management coping skills  and meditation     Depression Screening Follow-up Plan: Patient's depression screening was positive with a PHQ-9 score of 6. Patient with underlying depression and was advised to continue current medications as prescribed. Patient assessed for underlying major depression. They have no active suicidal ideations. Brief counseling provided and recommend additional follow-up/re-evaluation next office visit. Patient declines further evaluation by mental health professional and/or medications. They have no active suicidal ideations. Brief counseling provided and recommend additional follow-up/re-evaluation at next office visit. Patient advised to follow-up with PCP for further management.           Depression Screening and Follow-up Plan: Patient's depression screening was positive with a PHQ-9 score of 6. Patient assessed for underlying major depression. Brief counseling provided and recommend additional follow-up/re-evaluation next office visit. Patient declines further evaluation by mental health professional and/or medications. Brief counseling provided. Will re-evaluate at next office visit. Patient with underlying depression and was advised to continue current medications as prescribed. Patient advised to follow-up with PCP for further management.     Nutrition Assessment and Intervention:     Reviewed and updated Nutrition Prescription      Physical Activity Assessment and Intervention:    Reviewed and updated Physical Activity Prescription with patient      Emotional and Mental Well-being, Sleep, Connectedness Assessment and Intervention:    Sleep/stress assessment performed    Depression and anxiety screening performed and reviewed    PHQ-9 or GAD7 performed for initial evaluation or follow-up    Stress management plan created with patient    Stress management, meditation, yoga, or sleep online resources/apps provided to patient      Therapeutic Lifestyle Change Visit:     One-on-one comprehensive counseling,  coaching, and health behavior change visit completed       History of Present Illness     HPI  54 year old female here for BP follow up. Her BP was 130/78 in the office, well controlled with Lisinopril 10 mg po daily. She has been having a dry cough a few times per day from the lisinopril, she denies any other adverse effects of lisinopril. The cough does not bother her much and she does not feel the need to change to a different BP medication.    She notes in the past year she has been having irregular menstrual cycles, one every 2-3 months. She notes she is likely undergoing menopause. She denies any hot flashes nor increased difficulty sleeping. She does think that her concentration seems to be worsened over the last year, and wonders if increasing her ADHD medication would be helpful but would like to hold off at this time.     She has not been very physically active recently, taking a one-two 30 minute walks per week. She has not been doing any resistance training.     History obtained from : patient  Review of Systems   Constitutional:  Negative for chills and fever.   HENT:  Negative for ear pain and sore throat.    Eyes:  Negative for pain and visual disturbance.   Respiratory:  Positive for cough. Negative for shortness of breath.    Cardiovascular:  Negative for chest pain and palpitations.   Gastrointestinal:  Negative for abdominal pain and vomiting.   Genitourinary:  Negative for dysuria and hematuria.   Musculoskeletal:  Negative for arthralgias and back pain.   Skin:  Negative for color change and rash.   Neurological:  Negative for seizures and syncope.   Psychiatric/Behavioral:  Positive for decreased concentration. Negative for behavioral problems, confusion and suicidal ideas.    All other systems reviewed and are negative.    Current Outpatient Medications on File Prior to Visit   Medication Sig Dispense Refill    amphetamine-dextroamphetamine (ADDERALL) 15 MG tablet Take 1 tablet (15 mg total)  "by mouth 2 (two) times a day Take in AM and 4-6 hours later for second dose. Max Daily Amount: 30 mg 60 tablet 0    escitalopram (LEXAPRO) 20 mg tablet Take 1 tablet (20 mg total) by mouth daily 90 tablet 1    lisinopril (ZESTRIL) 10 mg tablet Take 1 tablet (10 mg total) by mouth daily       No current facility-administered medications on file prior to visit.      Social History     Tobacco Use    Smoking status: Never    Smokeless tobacco: Never   Vaping Use    Vaping status: Never Used   Substance and Sexual Activity    Alcohol use: Not Currently     Comment: Socially    Drug use: No    Sexual activity: Not Currently     Partners: Male     Birth control/protection: Abstinence, Condom Male         Objective     /78   Pulse 94   Temp 98.4 °F (36.9 °C)   Ht 5' 2\" (1.575 m)   Wt 68.8 kg (151 lb 9.6 oz)   SpO2 99%   BMI 27.73 kg/m²     Physical Exam  Vitals reviewed.   Constitutional:       General: She is not in acute distress.     Appearance: Normal appearance. She is well-developed. She is not ill-appearing or diaphoretic.   HENT:      Head: Normocephalic and atraumatic.      Right Ear: External ear normal.      Left Ear: External ear normal.      Nose: Nose normal.      Mouth/Throat:      Mouth: Mucous membranes are moist.      Pharynx: Oropharynx is clear.   Eyes:      Extraocular Movements: Extraocular movements intact.      Conjunctiva/sclera: Conjunctivae normal.      Pupils: Pupils are equal, round, and reactive to light.   Cardiovascular:      Rate and Rhythm: Normal rate and regular rhythm.      Pulses: Normal pulses.      Heart sounds: Normal heart sounds. No murmur heard.  Pulmonary:      Effort: Pulmonary effort is normal. No respiratory distress.      Breath sounds: Normal breath sounds.   Abdominal:      General: Abdomen is flat.      Palpations: Abdomen is soft.      Tenderness: There is no abdominal tenderness.   Musculoskeletal:         General: No swelling. Normal range of motion.      " Cervical back: Normal range of motion and neck supple.   Lymphadenopathy:      Cervical: No cervical adenopathy.   Skin:     General: Skin is warm and dry.      Capillary Refill: Capillary refill takes less than 2 seconds.   Neurological:      General: No focal deficit present.      Mental Status: She is alert and oriented to person, place, and time.   Psychiatric:         Thought Content: Thought content normal.         Judgment: Judgment normal.

## 2024-11-12 NOTE — ASSESSMENT & PLAN NOTE
Her blood pressure in the office today was 130/78 since decreasing her Lisinopril dose from 20 mg to 10 mg. She notes dry cough a couple of times daily associated with the ACEi, but it does not bother her much and she is not interested in switching to a different medication.   - Patient interested in possibly stopping her BP medication. Advised to keep BP log for next two weeks while continuing to take BP med. Return to office in two weeks to review BP log. May consider taking her off medication at that time and keeping another BP log for additional 2 weeks to assess Bps on and off medication.     Plan:  -Continue Lisinopril 10 mg PO daily

## 2024-11-22 ENCOUNTER — RA CDI HCC (OUTPATIENT)
Dept: OTHER | Facility: HOSPITAL | Age: 54
End: 2024-11-22

## 2024-12-06 ENCOUNTER — OFFICE VISIT (OUTPATIENT)
Age: 54
End: 2024-12-06
Payer: COMMERCIAL

## 2024-12-06 VITALS
TEMPERATURE: 98.2 F | DIASTOLIC BLOOD PRESSURE: 72 MMHG | WEIGHT: 148.8 LBS | BODY MASS INDEX: 27.38 KG/M2 | HEART RATE: 88 BPM | HEIGHT: 62 IN | SYSTOLIC BLOOD PRESSURE: 140 MMHG | OXYGEN SATURATION: 100 %

## 2024-12-06 DIAGNOSIS — I10 PRIMARY HYPERTENSION: ICD-10-CM

## 2024-12-06 DIAGNOSIS — F33.9 RECURRENT MAJOR DEPRESSIVE DISORDER, REMISSION STATUS UNSPECIFIED (HCC): Primary | ICD-10-CM

## 2024-12-06 DIAGNOSIS — N95.1 PERIMENOPAUSAL: ICD-10-CM

## 2024-12-06 PROCEDURE — 99213 OFFICE O/P EST LOW 20 MIN: CPT

## 2024-12-06 NOTE — PROGRESS NOTES
Name: Diamond Carey      : 1970      MRN: [id number]  Encounter Provider: Марина Moscoso DO  Encounter Date: 2024   Encounter department: [facility]  :  Assessment & Plan  Recurrent major depressive disorder, remission status unspecified (HCC)  Depression Screening Follow-up Plan: Patient's depression screening was positive with a PHQ-9 score of 6. Patient with underlying depression and was advised to continue current medications as prescribed. Patient assessed for underlying major depression. They have no active suicidal ideations. Brief counseling provided and recommend additional follow-up/re-evaluation next office visit. Patient advised to follow-up with PCP for further management. -Recommended trying behavioral therapy again with a different provider.  -Patient reports making lifestyle changes to improve her coping skills and stress management which she believes have helped her thus far. She still reports feeling overwhelmed at work due to several urgent messages throughout the day and anxiety related to task completion. She mentioned that her depression may be related to ADHD and childhood trauma. Patient scored a 6 on PHQ-9 which is positive.  - Continue Lexapro 20mg qd  - Recommended trying therapy again- patient would like to think about this and will let me know if she wants a referral to behavioral therapy        Primary hypertension  Patient has primary hypertension treated with lisinopril 10mg. She was interested in discontinuing medication, and kept a home log of blood pressures, ranging from 122-152 systolic with an average of 138-140. Based on these levels, patient was advised to continue her medication as she is still experiencing hypertension and is not at her goal. Patient requested to stay at her current dosage and would not like to increase dose at this time. Patient was educated on lifestyle changes like DASH diet, low sodium intake, and exercise.  Handout provided.   - Continue lisinopril 10mg qd  - Recommended initiation of lifestyle changes discussed above  - Created SMART goal: walk 10 min daily for 1 month   -Advised to keep taking BP log, provided instructions on proper measurement        Perimenopausal  Irregular periods for up to a year, have been spacing out to be every 2-3 months; most recent menstrual cycle was heavier than her normal   - Advised patient to keep log of menstrual cycle and to make appointment once she gets her next so we can discuss further evaluation and management if clinically indicated        Return in 1 month to reevaluate above.       Depression Screening and Follow-up Plan: Patient with underlying depression and was advised to continue current medications as prescribed. Yes      History of Present Illness     Diamond is a 54 year old female with a past medical history of ADHD, hypertension and depression presenting for follow up of hypertension and depression. Patient has been keeping a log of her blood pressures for the past few weeks, ranging from 122-152 systolic, averaging around 138-140. She also reports no longer experiencing a dry cough that was previously suspected to be associated with her lisinopril use. Regarding her depression, she has improved her coping skills/ stress management by walking more during breaks instead of sitting around and blocking off time in the mornings for focused work without responding to messages. She still reports a lot of work related stress, with several urgent messages throughout the day and thinks about doing a task for longer than actual task duration, stating that she gets overwhelmed by these things. She believes that her childhood trauma may play a role in this. She has tried therapy in the past, but reports poor experience with this.       Review of Systems   Constitutional: Negative.    HENT: Negative.     Eyes: Negative.    Respiratory: Negative.     Cardiovascular: Negative.   "  Gastrointestinal: Negative.    Endocrine: Negative.    Genitourinary: Negative.    Musculoskeletal: Negative.    Skin: Negative.    Allergic/Immunologic: Negative.    Neurological: Negative.    Hematological: Negative.    Psychiatric/Behavioral:  Positive for decreased concentration. The patient is nervous/anxious.      Current Outpatient Medications on File Prior to Visit   Medication Sig Dispense Refill    amphetamine-dextroamphetamine (ADDERALL) 15 MG tablet Take 1 tablet (15 mg total) by mouth 2 (two) times a day Take in AM and 4-6 hours later for second dose. Max Daily Amount: 30 mg 60 tablet 0    escitalopram (LEXAPRO) 20 mg tablet Take 1 tablet (20 mg total) by mouth daily 90 tablet 1    lisinopril (ZESTRIL) 10 mg tablet Take 1 tablet (10 mg total) by mouth daily       No current facility-administered medications on file prior to visit.      Social History     Tobacco Use    Smoking status: Never    Smokeless tobacco: Never   Vaping Use    Vaping status: Never Used   Substance and Sexual Activity    Alcohol use: Not Currently     Comment: Socially    Drug use: No    Sexual activity: Not Currently     Partners: Male     Birth control/protection: Abstinence, Condom Male        Objective   /72 (BP Location: Right arm, Patient Position: Sitting, Cuff Size: Standard)   Pulse 88   Temp 98.2 °F (36.8 °C) (Tympanic)   Ht 5' 2\" (1.575 m)   Wt 67.5 kg (148 lb 12.8 oz)   SpO2 100%   BMI 27.22 kg/m²      Physical Exam  Constitutional:       Appearance: Normal appearance.   HENT:      Head: Normocephalic.      Nose: Nose normal.   Eyes:      Extraocular Movements: Extraocular movements intact.      Conjunctiva/sclera: Conjunctivae normal.   Cardiovascular:      Rate and Rhythm: Normal rate and regular rhythm.      Pulses: Normal pulses.      Heart sounds: Normal heart sounds. No murmur heard.     No friction rub. No gallop.   Pulmonary:      Effort: Pulmonary effort is normal.      Breath sounds: Normal " breath sounds. No wheezing, rhonchi or rales.   Abdominal:      General: Abdomen is flat. Bowel sounds are normal.      Palpations: Abdomen is soft.   Musculoskeletal:         General: Normal range of motion.      Cervical back: Normal range of motion and neck supple.   Skin:     General: Skin is warm.      Capillary Refill: Capillary refill takes less than 2 seconds.   Neurological:      General: No focal deficit present.      Mental Status: She is alert and oriented to person, place, and time.   Psychiatric:         Behavior: Behavior normal.         Thought Content: Thought content normal.

## 2024-12-10 DIAGNOSIS — F90.0 ATTENTION DEFICIT HYPERACTIVITY DISORDER (ADHD), PREDOMINANTLY INATTENTIVE TYPE: ICD-10-CM

## 2024-12-10 NOTE — TELEPHONE ENCOUNTER
Reason for call:   [x] Refill   [] Prior Auth  [] Other:     Office:   [x] PCP/Provider - Sheree  [] Specialty/Provider -     Medication: Amphetamine-dextroamphetamine 15mg    Dose/Frequency: 1 tab bid    Quantity: 60    Pharmacy: [facility] [address] [place] [address] [address] [phone]     Does the patient have enough for 3 days?   [] Yes   [x] No - Send as HP to POD

## 2024-12-11 DIAGNOSIS — F90.0 ATTENTION DEFICIT HYPERACTIVITY DISORDER (ADHD), PREDOMINANTLY INATTENTIVE TYPE: ICD-10-CM

## 2024-12-11 RX ORDER — DEXTROAMPHETAMINE SACCHARATE, AMPHETAMINE ASPARTATE, DEXTROAMPHETAMINE SULFATE AND AMPHETAMINE SULFATE 3.75; 3.75; 3.75; 3.75 MG/1; MG/1; MG/1; MG/1
15 TABLET ORAL 2 TIMES DAILY
Qty: 60 TABLET | Refills: 0 | Status: SHIPPED | OUTPATIENT
Start: 2024-12-11 | End: 2024-12-11 | Stop reason: SDUPTHER

## 2024-12-11 NOTE — TELEPHONE ENCOUNTER
Patient calling as her [facility] pharmacy is no longer accepting GoodRx coupon codes and out of pocket would cost her 100 dollars so she is requesting to have script transferred to [facility]. Patient is feeling anxious because she will run out of medication tomorrow.     Reason for call:   [x] Refill   [] Prior Auth  [] Other:     Office:   [x] PCP/Provider - PG DIPAK HAN / Elza Bentley, DO   [] Specialty/Provider -     Medication: amphetamine-dextroamphetamine (ADDERALL) 15 MG tablet     Dose/Frequency: Take 1 tablet (15 mg total) by mouth 2 (two) times a day Take in AM and 4-6 hours later for second dose.     Quantity: 60    Pharmacy: [facility] [address] [place] [address]     Does the patient have enough for 3 days?   [] Yes   [x] No - Send as HP to POD

## 2024-12-12 RX ORDER — DEXTROAMPHETAMINE SACCHARATE, AMPHETAMINE ASPARTATE, DEXTROAMPHETAMINE SULFATE AND AMPHETAMINE SULFATE 3.75; 3.75; 3.75; 3.75 MG/1; MG/1; MG/1; MG/1
15 TABLET ORAL 2 TIMES DAILY
Qty: 60 TABLET | Refills: 0 | Status: SHIPPED | OUTPATIENT
Start: 2024-12-12

## 2024-12-12 NOTE — TELEPHONE ENCOUNTER
Patient called the RX Refill Line. Message is being forwarded to the office.     Patient is requesting if her rx can be sent asap today, she is out of meds now. [facility]  Pharmacy: [facility]/pharmacy #[id number] - [place] - [address] [facility] [address]. [phone]

## 2024-12-12 NOTE — TELEPHONE ENCOUNTER
As per Patient pharmacy is requesting a PA     Reason for call:   [x] Prior Auth  [] Other:     Caller:  [x] Patient  [] Pharmacy  Name:   Address:   Callback Number:     Medication: (ADDERALL) 15 MG tablet     Dose/Frequency: Take 1 tablet (15 mg total) by mouth 2 (two) times a day Take in AM and 4-6 hours later for second dose     Quantity: 60 TABLET    Ordering Provider:   [x] PCP/Provider -   [] Speciality/Provider -

## 2025-01-09 DIAGNOSIS — F90.0 ATTENTION DEFICIT HYPERACTIVITY DISORDER (ADHD), PREDOMINANTLY INATTENTIVE TYPE: ICD-10-CM

## 2025-01-09 RX ORDER — DEXTROAMPHETAMINE SACCHARATE, AMPHETAMINE ASPARTATE, DEXTROAMPHETAMINE SULFATE AND AMPHETAMINE SULFATE 3.75; 3.75; 3.75; 3.75 MG/1; MG/1; MG/1; MG/1
15 TABLET ORAL 2 TIMES DAILY
Qty: 60 TABLET | Refills: 0 | Status: SHIPPED | OUTPATIENT
Start: 2025-01-09

## 2025-01-09 NOTE — TELEPHONE ENCOUNTER
Reason for call:   [x] Refill   [] Prior Auth  [] Other:     Office:   [x] PCP/Provider - Jamil QUESADA/ Elza Bentley,    [] Specialty/Provider -     Medication: amphetamine-dextroamphetamine (ADDERALL) 15 MG tablet     Dose/Frequency:  Take 1 tablet (15 mg total) by mouth 2 (two) times a day Take in AM and 4-6 hours later for second dose     Quantity: 60    Pharmacy: [facility]/pharmacy #[id number] - [place] - [address] [facility].     Does the patient have enough for 3 days?   [] Yes   [x] No - Send as HP to POD

## 2025-02-06 DIAGNOSIS — F90.0 ATTENTION DEFICIT HYPERACTIVITY DISORDER (ADHD), PREDOMINANTLY INATTENTIVE TYPE: ICD-10-CM

## 2025-02-07 RX ORDER — DEXTROAMPHETAMINE SACCHARATE, AMPHETAMINE ASPARTATE, DEXTROAMPHETAMINE SULFATE AND AMPHETAMINE SULFATE 3.75; 3.75; 3.75; 3.75 MG/1; MG/1; MG/1; MG/1
15 TABLET ORAL 2 TIMES DAILY
Qty: 60 TABLET | Refills: 0 | Status: SHIPPED | OUTPATIENT
Start: 2025-02-07

## 2025-02-07 NOTE — TELEPHONE ENCOUNTER
Patient called to request a refill for their Adderall advised a refill was requested on 2/6/25 and is pending approval. Patient verbalized understanding and is in agreement.     Does the patient have enough for 3 days?   [] Yes   [x] No - Send as HP to POD

## 2025-03-10 DIAGNOSIS — F90.0 ATTENTION DEFICIT HYPERACTIVITY DISORDER (ADHD), PREDOMINANTLY INATTENTIVE TYPE: ICD-10-CM

## 2025-03-11 ENCOUNTER — OFFICE VISIT (OUTPATIENT)
Age: 55
End: 2025-03-11
Payer: COMMERCIAL

## 2025-03-11 VITALS
SYSTOLIC BLOOD PRESSURE: 128 MMHG | HEART RATE: 120 BPM | HEIGHT: 62 IN | TEMPERATURE: 98.2 F | OXYGEN SATURATION: 99 % | WEIGHT: 144 LBS | BODY MASS INDEX: 26.5 KG/M2 | DIASTOLIC BLOOD PRESSURE: 76 MMHG

## 2025-03-11 DIAGNOSIS — F33.0 MILD EPISODE OF RECURRENT MAJOR DEPRESSIVE DISORDER (HCC): Primary | ICD-10-CM

## 2025-03-11 DIAGNOSIS — F41.1 GENERALIZED ANXIETY DISORDER: ICD-10-CM

## 2025-03-11 DIAGNOSIS — I10 ESSENTIAL HYPERTENSION: ICD-10-CM

## 2025-03-11 PROCEDURE — 99213 OFFICE O/P EST LOW 20 MIN: CPT

## 2025-03-11 RX ORDER — DEXTROAMPHETAMINE SACCHARATE, AMPHETAMINE ASPARTATE, DEXTROAMPHETAMINE SULFATE AND AMPHETAMINE SULFATE 3.75; 3.75; 3.75; 3.75 MG/1; MG/1; MG/1; MG/1
15 TABLET ORAL 2 TIMES DAILY
Qty: 60 TABLET | Refills: 0 | Status: SHIPPED | OUTPATIENT
Start: 2025-03-11

## 2025-03-11 NOTE — ASSESSMENT & PLAN NOTE
PHQ 6 today and 6 at last office visit 12/6/24  Continue lexapro 20 mg daily   Discussed stress management skills  Not interested in therapy at this time

## 2025-03-11 NOTE — ASSESSMENT & PLAN NOTE
128/76 in office today   Range of Bps at home up to 130s systolic but did not bring in BP log   Advised to bring in BP log to next office visit   Continue lisinopril 10 mg daily   Discussed lifestyle interventions

## 2025-03-11 NOTE — PROGRESS NOTES
Name: Diamond Carey      : 1970      MRN: [id number]  Encounter Provider: Марина Moscoso DO  Encounter Date: 3/11/2025   Encounter department: [facility]  :  Assessment & Plan  Mild episode of recurrent major depressive disorder (HCC)  PHQ 6 today and 6 at last office visit 24  Continue lexapro 20 mg daily   Discussed stress management skills  Not interested in therapy at this time        Generalized anxiety disorder  PHANI 3 today  Continue lexapro 20 mg daily   Continue stress management skills       Essential hypertension  128/76 in office today   Range of Bps at home up to 130s systolic but did not bring in BP log   Advised to bring in BP log to next office visit   Continue lisinopril 10 mg daily   Discussed lifestyle interventions         Return for annual physical or sooner as needed.     Nutrition Assessment and Intervention:     Reviewed and updated Nutrition Prescription      Physical Activity Assessment and Intervention:    Reviewed and updated Physical Activity Prescription with patient      Emotional and Mental Well-being, Sleep, Connectedness Assessment and Intervention:    Sleep/stress assessment performed    Depression and anxiety screening performed and reviewed    PHQ-9 or GAD7 performed for initial evaluation or follow-up    Stress management plan created with patient           History of Present Illness   HPI  Patient is a 54-year-old female with past medical history of depression, hypertension, ADD, PHANI, PTSD, and insomnia that presents to the office for follow-up of depression and hypertension.  Reports that anxiety and depression are about the same, still struggling also with ADHD symptoms.  Denies SI.  Not interested in therapy at this time.  Has been taking BPs at home ranging upwards to 130s systolic.  No side effects with medications.    Review of Systems   Constitutional:  Negative for chills and fever.   HENT:  Negative for ear pain and sore  "throat.    Eyes:  Negative for pain and visual disturbance.   Respiratory:  Negative for cough and shortness of breath.    Cardiovascular:  Negative for chest pain and palpitations.   Gastrointestinal:  Negative for abdominal pain and vomiting.   Genitourinary:  Negative for dysuria and hematuria.   Musculoskeletal:  Negative for arthralgias and back pain.   Skin:  Negative for color change and rash.   Neurological:  Negative for seizures and syncope.   Psychiatric/Behavioral:  The patient is nervous/anxious.    All other systems reviewed and are negative.      Objective   /76   Pulse (!) 120   Temp 98.2 °F (36.8 °C)   Ht 5' 2\" (1.575 m)   Wt 65.3 kg (144 lb)   SpO2 99%   BMI 26.34 kg/m²      Physical Exam  Vitals reviewed.   Constitutional:       Appearance: Normal appearance. She is not ill-appearing, toxic-appearing or diaphoretic.   HENT:      Head: Normocephalic and atraumatic.      Right Ear: External ear normal.      Left Ear: External ear normal.      Nose: Nose normal.      Mouth/Throat:      Mouth: Mucous membranes are moist.      Pharynx: Oropharynx is clear.   Eyes:      Extraocular Movements: Extraocular movements intact.      Conjunctiva/sclera: Conjunctivae normal.      Pupils: Pupils are equal, round, and reactive to light.   Cardiovascular:      Rate and Rhythm: Normal rate and regular rhythm.      Pulses: Normal pulses.      Heart sounds: Normal heart sounds.   Pulmonary:      Effort: Pulmonary effort is normal.      Breath sounds: Normal breath sounds.   Abdominal:      General: Abdomen is flat. There is no distension.      Palpations: Abdomen is soft.      Tenderness: There is no abdominal tenderness. There is no guarding or rebound.   Musculoskeletal:         General: Normal range of motion.      Cervical back: Normal range of motion.      Right lower leg: No edema.      Left lower leg: No edema.   Skin:     General: Skin is warm.      Capillary Refill: Capillary refill takes less " than 2 seconds.      Coloration: Skin is not jaundiced.      Findings: No erythema.   Neurological:      General: No focal deficit present.      Mental Status: She is alert and oriented to person, place, and time. Mental status is at baseline.   Psychiatric:         Thought Content: Thought content normal.         Judgment: Judgment normal.      Comments: anxious

## 2025-04-01 ENCOUNTER — TELEPHONE (OUTPATIENT)
Age: 55
End: 2025-04-01

## 2025-04-01 ENCOUNTER — OFFICE VISIT (OUTPATIENT)
Age: 55
End: 2025-04-01
Payer: COMMERCIAL

## 2025-04-01 VITALS
WEIGHT: 144 LBS | DIASTOLIC BLOOD PRESSURE: 64 MMHG | HEIGHT: 62 IN | HEART RATE: 81 BPM | SYSTOLIC BLOOD PRESSURE: 120 MMHG | BODY MASS INDEX: 26.5 KG/M2 | TEMPERATURE: 97.6 F | OXYGEN SATURATION: 99 %

## 2025-04-01 DIAGNOSIS — I10 ESSENTIAL HYPERTENSION: ICD-10-CM

## 2025-04-01 DIAGNOSIS — Z00.00 ANNUAL PHYSICAL EXAM: Primary | ICD-10-CM

## 2025-04-01 DIAGNOSIS — Z12.31 ENCOUNTER FOR SCREENING MAMMOGRAM FOR BREAST CANCER: ICD-10-CM

## 2025-04-01 DIAGNOSIS — F90.0 ATTENTION DEFICIT HYPERACTIVITY DISORDER (ADHD), PREDOMINANTLY INATTENTIVE TYPE: ICD-10-CM

## 2025-04-01 DIAGNOSIS — F33.0 MILD EPISODE OF RECURRENT MAJOR DEPRESSIVE DISORDER (HCC): ICD-10-CM

## 2025-04-01 DIAGNOSIS — R53.83 FATIGUE, UNSPECIFIED TYPE: ICD-10-CM

## 2025-04-01 DIAGNOSIS — Z13.6 SCREENING FOR CARDIOVASCULAR CONDITION: ICD-10-CM

## 2025-04-01 DIAGNOSIS — D64.9 BORDERLINE ANEMIA: ICD-10-CM

## 2025-04-01 PROBLEM — F32.2 SEVERE MAJOR DEPRESSIVE DISORDER (HCC): Status: ACTIVE | Noted: 2025-04-01

## 2025-04-01 PROBLEM — F32.2 SEVERE MAJOR DEPRESSIVE DISORDER (HCC): Status: RESOLVED | Noted: 2025-04-01 | Resolved: 2025-04-01

## 2025-04-01 PROCEDURE — 99396 PREV VISIT EST AGE 40-64: CPT

## 2025-04-01 RX ORDER — LISINOPRIL 10 MG/1
10 TABLET ORAL DAILY
Qty: 30 TABLET | Refills: 0 | Status: SHIPPED | OUTPATIENT
Start: 2025-04-01 | End: 2025-05-01

## 2025-04-01 RX ORDER — DEXTROAMPHETAMINE SACCHARATE, AMPHETAMINE ASPARTATE, DEXTROAMPHETAMINE SULFATE AND AMPHETAMINE SULFATE 5; 5; 5; 5 MG/1; MG/1; MG/1; MG/1
20 TABLET ORAL
Qty: 60 TABLET | Refills: 0 | Status: SHIPPED | OUTPATIENT
Start: 2025-04-01 | End: 2025-05-01

## 2025-04-01 NOTE — PATIENT INSTRUCTIONS
"Patient Education     Routine physical for adults   The Basics   Written by the doctors and editors at [facility]   What is a physical? -- A physical is a routine visit, or \"check-up,\" with your doctor. You might also hear it called a \"wellness visit\" or \"preventive visit.\"  During each visit, the doctor will:   Ask about your physical and mental health   Ask about your habits, behaviors, and lifestyle   Do an exam   Give you vaccines if needed   Talk to you about any medicines you take   Give advice about your health   Answer your questions  Getting regular check-ups is an important part of taking care of your health. It can help your doctor find and treat any problems you have. But it's also important for preventing health problems.  A routine physical is different from a \"sick visit.\" A sick visit is when you see a doctor because of a health concern or problem. Since physicals are scheduled ahead of time, you can think about what you want to ask the doctor.  How often should I get a physical? -- It depends on your age and health. In general, for people age 21 years and older:   If you are younger than 50 years, you might be able to get a physical every 3 years.   If you are 50 years or older, your doctor might recommend a physical every year.  If you have an ongoing health condition, like diabetes or high blood pressure, your doctor will probably want to see you more often.  What happens during a physical? -- In general, each visit will include:   Physical exam - The doctor or nurse will check your height, weight, heart rate, and blood pressure. They will also look at your eyes and ears. They will ask about how you are feeling and whether you have any symptoms that bother you.   Medicines - It's a good idea to bring a list of all the medicines you take to each doctor visit. Your doctor will talk to you about your medicines and answer any questions. Tell them if you are having any side effects that bother you. You " "should also tell them if you are having trouble paying for any of your medicines.   Habits and behaviors - This includes:   Your diet   Your exercise habits   Whether you smoke, drink alcohol, or use drugs   Whether you are sexually active   Whether you feel safe at home  Your doctor will talk to you about things you can do to improve your health and lower your risk of health problems. They will also offer help and support. For example, if you want to quit smoking, they can give you advice and might prescribe medicines. If you want to improve your diet or get more physical activity, they can help you with this, too.   Lab tests, if needed - The tests you get will depend on your age and situation. For example, your doctor might want to check your:   Cholesterol   Blood sugar   Iron level   Vaccines - The recommended vaccines will depend on your age, health, and what vaccines you already had. Vaccines are very important because they can prevent certain serious or deadly infections.   Discussion of screening - \"Screening\" means checking for diseases or other health problems before they cause symptoms. Your doctor can recommend screening based on your age, risk, and preferences. This might include tests to check for:   Cancer, such as breast, prostate, cervical, ovarian, colorectal, prostate, lung, or skin cancer   Sexually transmitted infections, such as chlamydia and gonorrhea   Mental health conditions like depression and anxiety  Your doctor will talk to you about the different types of screening tests. They can help you decide which screenings to have. They can also explain what the results might mean.   Answering questions - The physical is a good time to ask the doctor or nurse questions about your health. If needed, they can refer you to other doctors or specialists, too.  Adults older than 65 years often need other care, too. As you get older, your doctor will talk to you about:   How to prevent falling at " home   Hearing or vision tests   Memory testing   How to take your medicines safely   Making sure that you have the help and support you need at home  All topics are updated as new evidence becomes available and our peer review process is complete.  This topic retrieved from [employer] on: May 02, 2024.  Topic 930440 Version 1.0  Release: 32.4.3 - C32.122  © 2024 UpToDate, Inc. and/or its affiliates. All rights reserved.  Consumer Information Use and Disclaimer   Disclaimer: This generalized information is a limited summary of diagnosis, treatment, and/or medication information. It is not meant to be comprehensive and should be used as a tool to help the user understand and/or assess potential diagnostic and treatment options. It does NOT include all information about conditions, treatments, medications, side effects, or risks that may apply to a specific patient. It is not intended to be medical advice or a substitute for the medical advice, diagnosis, or treatment of a health care provider based on the health care provider's examination and assessment of a patient's specific and unique circumstances. Patients must speak with a health care provider for complete information about their health, medical questions, and treatment options, including any risks or benefits regarding use of medications. This information does not endorse any treatments or medications as safe, effective, or approved for treating a specific patient. UpToDate, Inc. and its affiliates disclaim any warranty or liability relating to this information or the use thereof.The use of this information is governed by the Terms of Use, available at https://www.woltersAntFarmuwer.com/en/know/clinical-effectiveness-terms. 2024© UpToDate, Inc. and its affiliates and/or licensors. All rights reserved.  Copyright   © 2024 UpToDate, Inc. and/or its affiliates. All rights reserved.

## 2025-04-01 NOTE — ASSESSMENT & PLAN NOTE
Depression Screening Follow-up Plan: Patient's depression screening was positive with a PHQ-9 score of 6. Patient with underlying depression and was advised to continue current medications as prescribed. Patient advised to follow-up with PCP for further management.    Continue lexapro 20 mg daily, patient would like to stay at this dose for now

## 2025-04-01 NOTE — PROGRESS NOTES
Adult Annual Physical  Name: Diamond Carey      : 1970      MRN: [id number]  Encounter Provider: Марина Moscoso DO  Encounter Date: 2025   Encounter department: [facility]    Assessment & Plan  Annual physical exam  Mammogram ordered  Pap smear completed 24- normal pap, neg HPV  Colonoscopy done - next in 7 years from then   Declined flu shot, UTD tetanus       Encounter for screening mammogram for breast cancer  Mammo ordered  Orders:  •  Mammo screening bilateral w 3d and cad; Future    BMI 26.0-26.9,adult  Discussed lifestyle interventions   Labs as below  Orders:  •  Vitamin D 25 hydroxy; Future    Essential hypertension  Was taking lisinopril 10 mg twice daily, advised patient can reduce this to 10 mg once daily as prescribed and will recheck bp with bp log in 2 weeks   Orders:  •  lisinopril (ZESTRIL) 10 mg tablet; Take 1 tablet (10 mg total) by mouth daily    Mild episode of recurrent major depressive disorder (HCC)  Depression Screening Follow-up Plan: Patient's depression screening was positive with a PHQ-9 score of 6. Patient with underlying depression and was advised to continue current medications as prescribed. Patient advised to follow-up with PCP for further management.    Continue lexapro 20 mg daily, patient would like to stay at this dose for now       Fatigue, unspecified type  Fatigue throughout day  Labs as below   Has hx of borderline anemia  Orders:  •  Vitamin D 25 hydroxy; Future  •  CBC and differential; Future    Screening for cardiovascular condition  Labs as below  Orders:  •  Lipid panel; Future  •  Comprehensive metabolic panel; Future    Attention deficit hyperactivity disorder (ADHD), predominantly inattentive type    Orders:  •  amphetamine-dextroamphetamine (ADDERALL, 20MG,) 20 mg tablet; Take 1 tablet (20 mg total) by mouth 2 (two) times a day Max Daily Amount: 40 mg    Reports increased difficulty with task completion,  concentration, procrastination. Increased from 15 mg bid to 20 mg bid. PDMP reviewed.   Borderline anemia  Hx borderline anemia  With fatigue, ordered CBC  Orders:  •  CBC and differential; Future      Preventive Screenings:    - Cervical cancer screening: screening up-to-date     Immunizations:  - Immunizations due: Influenza and Zoster (Shingrix)      Depression Screening and Follow-up Plan: Patient's depression screening was positive with a PHQ-9 score of 6.   Patient with underlying depression and was advised to continue current medications as prescribed. Patient advised to follow-up with PCP for further management.         History of Present Illness   {?Quick Links Encounters * My Last Note * Last Note in Specialty * Snapshot * Since Last Visit * History :36176}  Adult Annual Physical:  Patient presents for annual physical.     Diet and Physical Activity:  - Diet/Nutrition: well balanced diet.  - Exercise: walking and 1-2 times a week on average.    General Health:  - Sleep: sleeps well and 7-8 hours of sleep on average.  - Hearing: normal hearing bilateral ears.  - Vision: most recent eye exam > 1 year ago.  - Dental: regular dental visits.    /GYN Health:  - Follows with GYN: yes.   - Menopause: perimenopausal.   - Last menstrual cycle: 1/1/2025.   - History of STDs: no    Advanced Care Planning:  - Has an advanced directive?: no    - Has a durable medical POA?: no    - ACP document given to patient?: yes      Review of Systems   Constitutional:  Positive for fatigue. Negative for chills and fever.   HENT:  Negative for ear pain and sore throat.    Eyes:  Negative for pain and visual disturbance.   Respiratory:  Negative for cough and shortness of breath.    Cardiovascular:  Negative for chest pain and palpitations.   Gastrointestinal:  Negative for abdominal pain and vomiting.   Genitourinary:  Negative for dysuria and hematuria.   Musculoskeletal:  Negative for arthralgias and back pain.   Skin:  Negative  "for color change and rash.   Neurological:  Negative for seizures and syncope.   All other systems reviewed and are negative.    Current Outpatient Medications on File Prior to Visit   Medication Sig Dispense Refill   • escitalopram (LEXAPRO) 20 mg tablet Take 1 tablet (20 mg total) by mouth daily 90 tablet 1   • [DISCONTINUED] amphetamine-dextroamphetamine (ADDERALL) 15 MG tablet Take 1 tablet (15 mg total) by mouth 2 (two) times a day Take in AM and 4-6 hours later for second dose. Max Daily Amount: 30 mg 60 tablet 0   • [DISCONTINUED] lisinopril (ZESTRIL) 10 mg tablet Take 1 tablet (10 mg total) by mouth daily       No current facility-administered medications on file prior to visit.      Social History     Tobacco Use   • Smoking status: Never   • Smokeless tobacco: Never   Vaping Use   • Vaping status: Never Used   Substance and Sexual Activity   • Alcohol use: Not Currently     Comment: Socially   • Drug use: No   • Sexual activity: Not Currently     Partners: Male     Birth control/protection: Abstinence, Condom Male       Objective {?Quick Links Trend Vitals * Enter New Vitals * Results Review * Timeline (Adult) * Labs * Imaging * Cardiology * Procedures * Lung Cancer Screening * Surgical eConsent :37568}  /64   Pulse 81   Temp 97.6 °F (36.4 °C)   Ht 5' 2\" (1.575 m)   Wt 65.3 kg (144 lb)   SpO2 99%   BMI 26.34 kg/m²     Physical Exam  Vitals reviewed.   Constitutional:       Appearance: Normal appearance. She is not ill-appearing, toxic-appearing or diaphoretic.   HENT:      Head: Normocephalic and atraumatic.      Right Ear: External ear normal.      Left Ear: External ear normal.      Nose: Nose normal.      Mouth/Throat:      Mouth: Mucous membranes are moist.      Pharynx: Oropharynx is clear.   Eyes:      Extraocular Movements: Extraocular movements intact.      Conjunctiva/sclera: Conjunctivae normal.      Pupils: Pupils are equal, round, and reactive to light.   Cardiovascular:      Rate " and Rhythm: Normal rate and regular rhythm.      Pulses: Normal pulses.      Heart sounds: Normal heart sounds.   Pulmonary:      Effort: Pulmonary effort is normal.      Breath sounds: Normal breath sounds.   Abdominal:      General: Abdomen is flat. There is no distension.      Palpations: Abdomen is soft.      Tenderness: There is no abdominal tenderness. There is no guarding or rebound.   Musculoskeletal:         General: Normal range of motion.      Cervical back: Normal range of motion.      Right lower leg: No edema.      Left lower leg: No edema.   Skin:     General: Skin is warm.      Capillary Refill: Capillary refill takes less than 2 seconds.      Coloration: Skin is not jaundiced.      Findings: No erythema.   Neurological:      General: No focal deficit present.      Mental Status: She is alert and oriented to person, place, and time. Mental status is at baseline.   Psychiatric:         Mood and Affect: Mood normal.         Behavior: Behavior normal.

## 2025-04-01 NOTE — ASSESSMENT & PLAN NOTE
Was taking lisinopril 10 mg twice daily, advised patient can reduce this to 10 mg once daily as prescribed and will recheck bp with bp log in 2 weeks   Orders:  •  lisinopril (ZESTRIL) 10 mg tablet; Take 1 tablet (10 mg total) by mouth daily

## 2025-04-01 NOTE — TELEPHONE ENCOUNTER
PA for (ADDERALL, 20MG,) SUBMITTED to [employer] Benefits    via    [x]CMM-KEY: [id number]  []Surescripts-Case ID #   []Availity-Auth ID # NDC #   []Faxed to plan   [x]Other website PROMPT PA   []Phone call Case ID #     [x]PA sent as URGENT    All office notes, labs and other pertaining documents and studies sent. Clinical questions answered. Awaiting determination from insurance company.     Turnaround time for your insurance to make a decision on your Prior Authorization can take 7-21 business days.

## 2025-04-01 NOTE — ASSESSMENT & PLAN NOTE
Orders:  •  amphetamine-dextroamphetamine (ADDERALL, 20MG,) 20 mg tablet; Take 1 tablet (20 mg total) by mouth 2 (two) times a day Max Daily Amount: 40 mg    Reports increased difficulty with task completion, concentration, procrastination. Increased from 15 mg bid to 20 mg bid. PDMP reviewed.

## 2025-04-02 NOTE — TELEPHONE ENCOUNTER
PA for (ADDERALL, 20MG,) APPROVED     Date(s) approved 04/01/25-03/31/27    Case # n/a    Patient advised by          []MyChart Message  []Phone call   [x]LMOM  []L/M to call office as no active Communication consent on file  []Unable to leave detailed message as VM not approved on Communication consent       Pharmacy advised by    [x]Fax  []Phone call  []Secure Chat       Approval letter scanned into Media Yes

## 2025-04-06 DIAGNOSIS — F32.A DEPRESSION, UNSPECIFIED DEPRESSION TYPE: ICD-10-CM

## 2025-04-07 RX ORDER — ESCITALOPRAM OXALATE 20 MG/1
20 TABLET ORAL DAILY
Qty: 90 TABLET | Refills: 1 | Status: SHIPPED | OUTPATIENT
Start: 2025-04-07

## 2025-04-29 DIAGNOSIS — F90.0 ATTENTION DEFICIT HYPERACTIVITY DISORDER (ADHD), PREDOMINANTLY INATTENTIVE TYPE: ICD-10-CM

## 2025-04-29 RX ORDER — DEXTROAMPHETAMINE SACCHARATE, AMPHETAMINE ASPARTATE, DEXTROAMPHETAMINE SULFATE AND AMPHETAMINE SULFATE 5; 5; 5; 5 MG/1; MG/1; MG/1; MG/1
20 TABLET ORAL
Qty: 60 TABLET | Refills: 0 | Status: SHIPPED | OUTPATIENT
Start: 2025-04-29 | End: 2025-05-29

## 2025-04-29 NOTE — TELEPHONE ENCOUNTER
Reason for call:   [x] Refill   [] Prior Auth  [] Other:     Office:   [x] PCP/Provider - [facility]   [] Specialty/Provider -     Medication: amphetamine     Dose/Frequency: 20 mg take 1 tablet 2 times daily     Quantity: 60    Pharmacy: [facility]     Local Pharmacy   Does the patient have enough for 3 days?   [] Yes   [x] No - Send as HP to POD    Mail Away Pharmacy   Does the patient have enough for 10 days?   [] Yes   [] No - Send as HP to POD

## 2025-05-02 DIAGNOSIS — I10 ESSENTIAL HYPERTENSION: ICD-10-CM

## 2025-05-02 RX ORDER — LISINOPRIL 10 MG/1
10 TABLET ORAL DAILY
Qty: 30 TABLET | Refills: 0 | Status: SHIPPED | OUTPATIENT
Start: 2025-05-02 | End: 2025-05-04

## 2025-05-04 RX ORDER — LISINOPRIL 10 MG/1
10 TABLET ORAL DAILY
Qty: 90 TABLET | Refills: 0 | Status: SHIPPED | OUTPATIENT
Start: 2025-05-04 | End: 2025-08-02

## 2025-05-18 ENCOUNTER — APPOINTMENT (OUTPATIENT)
Dept: LAB | Facility: CLINIC | Age: 55
End: 2025-05-18
Payer: COMMERCIAL

## 2025-05-18 DIAGNOSIS — Z13.6 SCREENING FOR CARDIOVASCULAR CONDITION: ICD-10-CM

## 2025-05-18 DIAGNOSIS — R53.83 FATIGUE, UNSPECIFIED TYPE: ICD-10-CM

## 2025-05-18 DIAGNOSIS — D64.9 BORDERLINE ANEMIA: ICD-10-CM

## 2025-05-18 LAB
25(OH)D3 SERPL-MCNC: 15.7 NG/ML (ref 30–100)
ALBUMIN SERPL BCG-MCNC: 4.1 G/DL (ref 3.5–5)
ALP SERPL-CCNC: 78 U/L (ref 34–104)
ALT SERPL W P-5'-P-CCNC: 17 U/L (ref 7–52)
ANION GAP SERPL CALCULATED.3IONS-SCNC: 7 MMOL/L (ref 4–13)
AST SERPL W P-5'-P-CCNC: 20 U/L (ref 13–39)
BASOPHILS # BLD AUTO: 0.02 THOUSANDS/ÂΜL (ref 0–0.1)
BASOPHILS NFR BLD AUTO: 0 % (ref 0–1)
BILIRUB SERPL-MCNC: 0.49 MG/DL (ref 0.2–1)
BUN SERPL-MCNC: 17 MG/DL (ref 5–25)
CALCIUM SERPL-MCNC: 9.1 MG/DL (ref 8.4–10.2)
CHLORIDE SERPL-SCNC: 107 MMOL/L (ref 96–108)
CHOLEST SERPL-MCNC: 132 MG/DL (ref ?–200)
CO2 SERPL-SCNC: 27 MMOL/L (ref 21–32)
CREAT SERPL-MCNC: 0.7 MG/DL (ref 0.6–1.3)
EOSINOPHIL # BLD AUTO: 0.17 THOUSAND/ÂΜL (ref 0–0.61)
EOSINOPHIL NFR BLD AUTO: 3 % (ref 0–6)
ERYTHROCYTE [DISTWIDTH] IN BLOOD BY AUTOMATED COUNT: 13.9 % (ref 11.6–15.1)
GFR SERPL CREATININE-BSD FRML MDRD: 98 ML/MIN/1.73SQ M
GLUCOSE P FAST SERPL-MCNC: 94 MG/DL (ref 65–99)
HCT VFR BLD AUTO: 33 % (ref 34.8–46.1)
HDLC SERPL-MCNC: 53 MG/DL
HGB BLD-MCNC: 11 G/DL (ref 11.5–15.4)
IMM GRANULOCYTES # BLD AUTO: 0.01 THOUSAND/UL (ref 0–0.2)
IMM GRANULOCYTES NFR BLD AUTO: 0 % (ref 0–2)
LDLC SERPL CALC-MCNC: 64 MG/DL (ref 0–100)
LYMPHOCYTES # BLD AUTO: 2.06 THOUSANDS/ÂΜL (ref 0.6–4.47)
LYMPHOCYTES NFR BLD AUTO: 38 % (ref 14–44)
MCH RBC QN AUTO: 30.4 PG (ref 26.8–34.3)
MCHC RBC AUTO-ENTMCNC: 33.3 G/DL (ref 31.4–37.4)
MCV RBC AUTO: 91 FL (ref 82–98)
MONOCYTES # BLD AUTO: 0.35 THOUSAND/ÂΜL (ref 0.17–1.22)
MONOCYTES NFR BLD AUTO: 7 % (ref 4–12)
NEUTROPHILS # BLD AUTO: 2.76 THOUSANDS/ÂΜL (ref 1.85–7.62)
NEUTS SEG NFR BLD AUTO: 52 % (ref 43–75)
NONHDLC SERPL-MCNC: 79 MG/DL
NRBC BLD AUTO-RTO: 0 /100 WBCS
PLATELET # BLD AUTO: 204 THOUSANDS/UL (ref 149–390)
PMV BLD AUTO: 9.6 FL (ref 8.9–12.7)
POTASSIUM SERPL-SCNC: 4.1 MMOL/L (ref 3.5–5.3)
PROT SERPL-MCNC: 7.2 G/DL (ref 6.4–8.4)
RBC # BLD AUTO: 3.62 MILLION/UL (ref 3.81–5.12)
SODIUM SERPL-SCNC: 141 MMOL/L (ref 135–147)
TRIGL SERPL-MCNC: 77 MG/DL (ref ?–150)
WBC # BLD AUTO: 5.37 THOUSAND/UL (ref 4.31–10.16)

## 2025-05-18 PROCEDURE — 36415 COLL VENOUS BLD VENIPUNCTURE: CPT

## 2025-05-18 PROCEDURE — 82306 VITAMIN D 25 HYDROXY: CPT

## 2025-05-18 PROCEDURE — 85025 COMPLETE CBC W/AUTO DIFF WBC: CPT

## 2025-05-18 PROCEDURE — 80053 COMPREHEN METABOLIC PANEL: CPT

## 2025-05-18 PROCEDURE — 80061 LIPID PANEL: CPT

## 2025-05-19 ENCOUNTER — RESULTS FOLLOW-UP (OUTPATIENT)
Age: 55
End: 2025-05-19

## 2025-05-19 DIAGNOSIS — E55.9 VITAMIN D DEFICIENCY: Primary | ICD-10-CM

## 2025-05-19 NOTE — RESULT ENCOUNTER NOTE
Called patient to discuss lab results.  Vitamin D low, patient started taking 2000 IU of vitamin D starting yesterday.  Advised to continue this and we can recheck labs in 3 months.  CBC revealing hemoglobin of 11, seems to be baseline for past 7 years.  Patient taking multivitamin with iron supplementation and reports will increase iron intake.  Lipid panel and CMP unremarkable.  All questions answered.

## 2025-05-27 DIAGNOSIS — F90.0 ATTENTION DEFICIT HYPERACTIVITY DISORDER (ADHD), PREDOMINANTLY INATTENTIVE TYPE: ICD-10-CM

## 2025-05-28 RX ORDER — DEXTROAMPHETAMINE SACCHARATE, AMPHETAMINE ASPARTATE, DEXTROAMPHETAMINE SULFATE AND AMPHETAMINE SULFATE 5; 5; 5; 5 MG/1; MG/1; MG/1; MG/1
20 TABLET ORAL
Qty: 60 TABLET | Refills: 0 | Status: SHIPPED | OUTPATIENT
Start: 2025-05-28 | End: 2025-06-27

## 2025-06-08 DIAGNOSIS — I10 ESSENTIAL HYPERTENSION: ICD-10-CM

## 2025-06-08 RX ORDER — LISINOPRIL 10 MG/1
10 TABLET ORAL DAILY
Qty: 90 TABLET | Refills: 1 | Status: SHIPPED | OUTPATIENT
Start: 2025-06-08

## 2025-06-25 DIAGNOSIS — F90.0 ATTENTION DEFICIT HYPERACTIVITY DISORDER (ADHD), PREDOMINANTLY INATTENTIVE TYPE: ICD-10-CM

## 2025-06-25 RX ORDER — DEXTROAMPHETAMINE SACCHARATE, AMPHETAMINE ASPARTATE, DEXTROAMPHETAMINE SULFATE AND AMPHETAMINE SULFATE 5; 5; 5; 5 MG/1; MG/1; MG/1; MG/1
20 TABLET ORAL
Qty: 60 TABLET | Refills: 0 | Status: SHIPPED | OUTPATIENT
Start: 2025-06-25 | End: 2025-07-25

## 2025-07-23 DIAGNOSIS — F90.0 ATTENTION DEFICIT HYPERACTIVITY DISORDER (ADHD), PREDOMINANTLY INATTENTIVE TYPE: ICD-10-CM

## 2025-07-23 NOTE — TELEPHONE ENCOUNTER
Reason for call:   [x] Refill   [] Prior Auth  [] Other:     Office:   [x] PCP/Provider -   [] Specialty/Provider -     amphetamine-dextroamphetamine (ADDERALL, 20MG,) 20 mg tablet  Take 1 tablet (20 mg total) by mouth 2 (two) times a day Max Daily Amount: 40 mg  Normal, Disp-60 tablet, R-0      [id number] - [place], [place] - [address] [phone]      Local Pharmacy   Does the patient have enough for 3 days?   [x] Yes   [] No - Send as HP to POD    Mail Away Pharmacy   Does the patient have enough for 10 days?   [] Yes   [] No - Send as HP to POD

## 2025-07-24 RX ORDER — DEXTROAMPHETAMINE SACCHARATE, AMPHETAMINE ASPARTATE, DEXTROAMPHETAMINE SULFATE AND AMPHETAMINE SULFATE 5; 5; 5; 5 MG/1; MG/1; MG/1; MG/1
20 TABLET ORAL
Qty: 60 TABLET | Refills: 0 | Status: SHIPPED | OUTPATIENT
Start: 2025-07-24 | End: 2025-08-23

## 2025-07-25 ENCOUNTER — HOSPITAL ENCOUNTER (OUTPATIENT)
Facility: HOSPITAL | Age: 55
End: 2025-07-25
Payer: COMMERCIAL

## 2025-07-25 DIAGNOSIS — Z12.31 ENCOUNTER FOR SCREENING MAMMOGRAM FOR BREAST CANCER: ICD-10-CM

## 2025-07-25 PROCEDURE — 77067 SCR MAMMO BI INCL CAD: CPT

## 2025-07-25 PROCEDURE — 77063 BREAST TOMOSYNTHESIS BI: CPT

## 2025-07-29 NOTE — RESULT ENCOUNTER NOTE
Discussed architectural distortion seen in upper outer quadrant of left breast with patient.  Ordered diagnostic mammogram and ultrasound of left breast for further imaging.  No abnormalities seen in right breast-routine screening mammogram in 1 year for right breast.  Answered all questions.

## 2025-08-05 ENCOUNTER — HOSPITAL ENCOUNTER (OUTPATIENT)
Dept: MAMMOGRAPHY | Facility: CLINIC | Age: 55
Discharge: HOME | End: 2025-08-05
Payer: COMMERCIAL

## 2025-08-05 ENCOUNTER — HOSPITAL ENCOUNTER (OUTPATIENT)
Dept: ULTRASOUND IMAGING | Facility: CLINIC | Age: 55
Discharge: HOME | End: 2025-08-05
Payer: COMMERCIAL

## 2025-08-05 VITALS — WEIGHT: 144 LBS | BODY MASS INDEX: 26.5 KG/M2 | HEIGHT: 62 IN

## 2025-08-05 VITALS — HEART RATE: 89 BPM | SYSTOLIC BLOOD PRESSURE: 185 MMHG | DIASTOLIC BLOOD PRESSURE: 118 MMHG

## 2025-08-05 DIAGNOSIS — R92.8 ABNORMAL MAMMOGRAM: ICD-10-CM

## 2025-08-05 PROCEDURE — 88342 IMHCHEM/IMCYTCHM 1ST ANTB: CPT | Performed by: PATHOLOGY

## 2025-08-05 PROCEDURE — 88360 TUMOR IMMUNOHISTOCHEM/MANUAL: CPT | Performed by: PATHOLOGY

## 2025-08-05 PROCEDURE — 88341 IMHCHEM/IMCYTCHM EA ADD ANTB: CPT | Performed by: PATHOLOGY

## 2025-08-05 PROCEDURE — 77066 DX MAMMO INCL CAD BI: CPT

## 2025-08-05 PROCEDURE — 19084 BX BREAST ADD LESION US IMAG: CPT

## 2025-08-05 PROCEDURE — 88305 TISSUE EXAM BY PATHOLOGIST: CPT | Performed by: PATHOLOGY

## 2025-08-05 PROCEDURE — 76642 ULTRASOUND BREAST LIMITED: CPT

## 2025-08-05 PROCEDURE — 19083 BX BREAST 1ST LESION US IMAG: CPT

## 2025-08-05 PROCEDURE — A4648 IMPLANTABLE TISSUE MARKER: HCPCS

## 2025-08-05 RX ORDER — LIDOCAINE HYDROCHLORIDE 10 MG/ML
5 INJECTION, SOLUTION EPIDURAL; INFILTRATION; INTRACAUDAL; PERINEURAL ONCE
Status: COMPLETED | OUTPATIENT
Start: 2025-08-05 | End: 2025-08-05

## 2025-08-05 RX ADMIN — IOHEXOL 98 ML: 350 INJECTION, SOLUTION INTRAVENOUS at 12:52

## 2025-08-05 RX ADMIN — LIDOCAINE HYDROCHLORIDE 5 ML: 10 INJECTION, SOLUTION EPIDURAL; INFILTRATION; INTRACAUDAL; PERINEURAL at 14:56

## 2025-08-05 RX ADMIN — LIDOCAINE HYDROCHLORIDE 5 ML: 10 INJECTION, SOLUTION EPIDURAL; INFILTRATION; INTRACAUDAL; PERINEURAL at 14:46

## 2025-08-07 ENCOUNTER — TELEPHONE (OUTPATIENT)
Dept: MAMMOGRAPHY | Facility: CLINIC | Age: 55
End: 2025-08-07

## 2025-08-07 ENCOUNTER — DOCUMENTATION (OUTPATIENT)
Dept: HEMATOLOGY ONCOLOGY | Facility: CLINIC | Age: 55
End: 2025-08-07

## 2025-08-07 DIAGNOSIS — C50.912 INVASIVE DUCTAL CARCINOMA OF BREAST, LEFT (HCC): Primary | ICD-10-CM

## 2025-08-08 ENCOUNTER — TELEPHONE (OUTPATIENT)
Age: 55
End: 2025-08-08

## 2025-08-08 ENCOUNTER — TELEPHONE (OUTPATIENT)
Dept: GENETICS | Facility: CLINIC | Age: 55
End: 2025-08-08

## 2025-08-08 DIAGNOSIS — Z80.8 FAMILY HISTORY OF BRAIN CANCER: ICD-10-CM

## 2025-08-08 DIAGNOSIS — C50.912 MALIGNANT NEOPLASM OF LEFT FEMALE BREAST, UNSPECIFIED ESTROGEN RECEPTOR STATUS, UNSPECIFIED SITE OF BREAST (HCC): Primary | ICD-10-CM

## 2025-08-08 DIAGNOSIS — Z80.0 FAMILY HISTORY OF COLON CANCER: ICD-10-CM

## 2025-08-09 ENCOUNTER — DOCUMENTATION (OUTPATIENT)
Dept: HEMATOLOGY ONCOLOGY | Facility: CLINIC | Age: 55
End: 2025-08-09

## 2025-08-10 ENCOUNTER — APPOINTMENT (OUTPATIENT)
Dept: LAB | Facility: CLINIC | Age: 55
End: 2025-08-10
Payer: COMMERCIAL

## 2025-08-13 ENCOUNTER — PATIENT OUTREACH (OUTPATIENT)
Dept: HEMATOLOGY ONCOLOGY | Facility: CLINIC | Age: 55
End: 2025-08-13

## 2025-08-14 ENCOUNTER — HOSPITAL ENCOUNTER (OUTPATIENT)
Dept: ULTRASOUND IMAGING | Facility: CLINIC | Age: 55
End: 2025-08-14
Payer: COMMERCIAL

## 2025-08-14 ENCOUNTER — HOSPITAL ENCOUNTER (OUTPATIENT)
Dept: MAMMOGRAPHY | Facility: CLINIC | Age: 55
End: 2025-08-14
Payer: COMMERCIAL

## 2025-08-15 PROBLEM — C50.412 MALIGNANT NEOPLASM OF UPPER-OUTER QUADRANT OF LEFT BREAST IN FEMALE, ESTROGEN RECEPTOR POSITIVE (HCC): Status: ACTIVE | Noted: 2025-08-15

## 2025-08-15 PROBLEM — Z17.0 MALIGNANT NEOPLASM OF UPPER-OUTER QUADRANT OF LEFT BREAST IN FEMALE, ESTROGEN RECEPTOR POSITIVE (HCC): Status: ACTIVE | Noted: 2025-08-15

## 2025-08-18 PROBLEM — R92.342 EXTREMELY DENSE TISSUE OF LEFT BREAST ON MAMMOGRAPHY: Status: ACTIVE | Noted: 2025-08-18

## 2025-08-19 ENCOUNTER — TELEPHONE (OUTPATIENT)
Dept: MAMMOGRAPHY | Facility: CLINIC | Age: 55
End: 2025-08-19

## 2025-08-19 ENCOUNTER — DOCUMENTATION (OUTPATIENT)
Dept: HEMATOLOGY ONCOLOGY | Facility: CLINIC | Age: 55
End: 2025-08-19

## 2025-08-19 PROBLEM — R92.343 EXTREMELY DENSE TISSUE OF BOTH BREASTS ON MAMMOGRAPHY: Status: ACTIVE | Noted: 2025-08-18

## 2025-08-20 ENCOUNTER — PATIENT OUTREACH (OUTPATIENT)
Dept: HEMATOLOGY ONCOLOGY | Facility: CLINIC | Age: 55
End: 2025-08-20

## 2025-08-20 ENCOUNTER — DOCUMENTATION (OUTPATIENT)
Dept: HEMATOLOGY ONCOLOGY | Facility: CLINIC | Age: 55
End: 2025-08-20

## 2025-08-20 ENCOUNTER — OFFICE VISIT (OUTPATIENT)
Dept: SURGICAL ONCOLOGY | Facility: CLINIC | Age: 55
End: 2025-08-20
Payer: COMMERCIAL

## 2025-08-20 VITALS
RESPIRATION RATE: 16 BRPM | OXYGEN SATURATION: 98 % | HEIGHT: 62 IN | SYSTOLIC BLOOD PRESSURE: 146 MMHG | TEMPERATURE: 98 F | BODY MASS INDEX: 26.87 KG/M2 | HEART RATE: 94 BPM | DIASTOLIC BLOOD PRESSURE: 84 MMHG | WEIGHT: 146 LBS

## 2025-08-20 DIAGNOSIS — Z17.0 MALIGNANT NEOPLASM OF UPPER-OUTER QUADRANT OF LEFT BREAST IN FEMALE, ESTROGEN RECEPTOR POSITIVE (HCC): Primary | ICD-10-CM

## 2025-08-20 DIAGNOSIS — N95.1 PERIMENOPAUSAL: ICD-10-CM

## 2025-08-20 DIAGNOSIS — N60.91 ATYPICAL DUCTAL HYPERPLASIA OF RIGHT BREAST: ICD-10-CM

## 2025-08-20 DIAGNOSIS — Z13.71 BRCA GENE MUTATION NEGATIVE: ICD-10-CM

## 2025-08-20 DIAGNOSIS — F41.1 GENERALIZED ANXIETY DISORDER: ICD-10-CM

## 2025-08-20 DIAGNOSIS — R92.343 EXTREMELY DENSE TISSUE OF BOTH BREASTS ON MAMMOGRAPHY: ICD-10-CM

## 2025-08-20 DIAGNOSIS — F33.0 MILD EPISODE OF RECURRENT MAJOR DEPRESSIVE DISORDER (HCC): ICD-10-CM

## 2025-08-20 DIAGNOSIS — Z12.11 SCREENING FOR COLORECTAL CANCER: ICD-10-CM

## 2025-08-20 DIAGNOSIS — C50.412 MALIGNANT NEOPLASM OF UPPER-OUTER QUADRANT OF LEFT BREAST IN FEMALE, ESTROGEN RECEPTOR POSITIVE (HCC): Primary | ICD-10-CM

## 2025-08-20 DIAGNOSIS — Z12.12 SCREENING FOR COLORECTAL CANCER: ICD-10-CM

## 2025-08-20 DIAGNOSIS — C50.912 INVASIVE DUCTAL CARCINOMA OF BREAST, LEFT (HCC): ICD-10-CM

## 2025-08-20 DIAGNOSIS — F90.0 ATTENTION DEFICIT HYPERACTIVITY DISORDER (ADHD), PREDOMINANTLY INATTENTIVE TYPE: ICD-10-CM

## 2025-08-20 PROCEDURE — 99205 OFFICE O/P NEW HI 60 MIN: CPT | Performed by: STUDENT IN AN ORGANIZED HEALTH CARE EDUCATION/TRAINING PROGRAM

## 2025-08-20 PROCEDURE — 99417 PROLNG OP E/M EACH 15 MIN: CPT | Performed by: STUDENT IN AN ORGANIZED HEALTH CARE EDUCATION/TRAINING PROGRAM

## 2025-08-20 RX ORDER — DEXTROAMPHETAMINE SACCHARATE, AMPHETAMINE ASPARTATE, DEXTROAMPHETAMINE SULFATE AND AMPHETAMINE SULFATE 5; 5; 5; 5 MG/1; MG/1; MG/1; MG/1
20 TABLET ORAL
Qty: 60 TABLET | Refills: 0 | Status: SHIPPED | OUTPATIENT
Start: 2025-08-20 | End: 2025-09-19

## 2025-08-21 ENCOUNTER — DOCUMENTATION (OUTPATIENT)
Dept: HEMATOLOGY ONCOLOGY | Facility: CLINIC | Age: 55
End: 2025-08-21

## 2025-08-21 ENCOUNTER — PATIENT OUTREACH (OUTPATIENT)
Dept: HEMATOLOGY ONCOLOGY | Facility: CLINIC | Age: 55
End: 2025-08-21

## 2025-08-21 ENCOUNTER — PATIENT OUTREACH (OUTPATIENT)
Dept: CASE MANAGEMENT | Facility: OTHER | Age: 55
End: 2025-08-21

## 2025-08-22 ENCOUNTER — PATIENT OUTREACH (OUTPATIENT)
Dept: HEMATOLOGY ONCOLOGY | Facility: CLINIC | Age: 55
End: 2025-08-22

## 2025-08-22 DIAGNOSIS — Z17.0 MALIGNANT NEOPLASM OF UPPER-OUTER QUADRANT OF LEFT BREAST IN FEMALE, ESTROGEN RECEPTOR POSITIVE (HCC): Primary | ICD-10-CM

## 2025-08-22 DIAGNOSIS — C50.412 MALIGNANT NEOPLASM OF UPPER-OUTER QUADRANT OF LEFT BREAST IN FEMALE, ESTROGEN RECEPTOR POSITIVE (HCC): Primary | ICD-10-CM
